# Patient Record
Sex: MALE | Race: BLACK OR AFRICAN AMERICAN | Employment: OTHER | URBAN - METROPOLITAN AREA
[De-identification: names, ages, dates, MRNs, and addresses within clinical notes are randomized per-mention and may not be internally consistent; named-entity substitution may affect disease eponyms.]

---

## 2017-02-02 ENCOUNTER — TELEPHONE (OUTPATIENT)
Dept: ENDOCRINOLOGY CLINIC | Facility: CLINIC | Age: 57
End: 2017-02-02

## 2017-02-13 ENCOUNTER — OFFICE VISIT (OUTPATIENT)
Dept: ENDOCRINOLOGY CLINIC | Facility: CLINIC | Age: 57
End: 2017-02-13

## 2017-02-13 VITALS
DIASTOLIC BLOOD PRESSURE: 80 MMHG | HEART RATE: 72 BPM | SYSTOLIC BLOOD PRESSURE: 126 MMHG | WEIGHT: 181 LBS | RESPIRATION RATE: 18 BRPM | TEMPERATURE: 98 F | BODY MASS INDEX: 25.62 KG/M2 | HEIGHT: 70.5 IN

## 2017-02-13 DIAGNOSIS — IMO0001 UNCONTROLLED TYPE 2 DIABETES MELLITUS WITHOUT COMPLICATION, WITH LONG-TERM CURRENT USE OF INSULIN: Primary | ICD-10-CM

## 2017-02-13 DIAGNOSIS — E78.00 PURE HYPERCHOLESTEROLEMIA: ICD-10-CM

## 2017-02-13 LAB
CARTRIDGE LOT#: 660 NUMERIC
HEMOGLOBIN A1C: 10.9 % (ref 4.3–5.6)

## 2017-02-13 PROCEDURE — 36415 COLL VENOUS BLD VENIPUNCTURE: CPT | Performed by: NURSE PRACTITIONER

## 2017-02-13 PROCEDURE — 83036 HEMOGLOBIN GLYCOSYLATED A1C: CPT | Performed by: NURSE PRACTITIONER

## 2017-02-13 PROCEDURE — 99214 OFFICE O/P EST MOD 30 MIN: CPT | Performed by: NURSE PRACTITIONER

## 2017-02-13 RX ORDER — ATORVASTATIN CALCIUM 10 MG/1
10 TABLET, FILM COATED ORAL NIGHTLY
Qty: 30 TABLET | Refills: 3 | Status: SHIPPED | OUTPATIENT
Start: 2017-02-13 | End: 2017-06-22

## 2017-02-13 NOTE — PROGRESS NOTES
Lillian,    Pt new to [address]   Explained balanced plate (limit starch, include protein, eat heart healthy), A1C grid, natual progression of T2DB (and importance of regular physical activity and good BG control to possibly limit meds and slow progression),

## 2017-02-13 NOTE — PATIENT INSTRUCTIONS
Start metformin 500 mg after breakfast and dinner   After 1 week increase to 2 tablets (1000 mg) after breakfast and dinner   Take Tresiba 34 units nightly   Check sugar twice a day and record readings   Go to [facility] for eye exam   Return in 3 weeks with read

## 2017-02-13 NOTE — PROGRESS NOTES
CC: Patient presents with:  Diabetes      HISTORY:  Past Medical History   Diagnosis Date   • ED (erectile dysfunction) 7/15/2013   • Pure hypercholesterolemia 7/29/2013      No past surgical history on file. No family history on file.      Smoking Status swelling. ASA none  daily. Gastrointestinal: Negative for nausea, vomiting, abdominal pain, diarrhea and abdominal distention. Genitourinary: Negative for dysuria, discharge, and difficulty urinating. Goes to ED clinic.    Musculoskeletal: Negative for m murmur, rubs or gallops. Pulmonary/Chest: Effort normal and breath sounds normal. No respiratory distress. No wheezes, rhonchi or rales   Abdominal: Soft. Bowel sounds are normal. Non tender, no masses, no lipodystrophy. Skin: Skin is warm and dry.  No exam: gave info on [facility] asked to make appt asap   Albumin/Cr ratio: no proteinuria   Monofilament exam: today   BP: at goal no meds   Lipids: not at goal cont statin recheck in 2 months   Tobacco: n/a   Flu vaccine: needs   Pneumonia vaccine: nee

## 2017-03-07 ENCOUNTER — OFFICE VISIT (OUTPATIENT)
Dept: ENDOCRINOLOGY CLINIC | Facility: CLINIC | Age: 57
End: 2017-03-07

## 2017-03-07 VITALS
HEART RATE: 64 BPM | WEIGHT: 187 LBS | HEIGHT: 71.5 IN | TEMPERATURE: 98 F | RESPIRATION RATE: 18 BRPM | SYSTOLIC BLOOD PRESSURE: 130 MMHG | BODY MASS INDEX: 25.61 KG/M2 | DIASTOLIC BLOOD PRESSURE: 78 MMHG

## 2017-03-07 DIAGNOSIS — IMO0001 UNCONTROLLED TYPE 2 DIABETES MELLITUS WITHOUT COMPLICATION, WITH LONG-TERM CURRENT USE OF INSULIN: Primary | ICD-10-CM

## 2017-03-07 PROCEDURE — 99213 OFFICE O/P EST LOW 20 MIN: CPT | Performed by: NURSE PRACTITIONER

## 2017-03-07 NOTE — PROGRESS NOTES
CC: Patient presents with:  Diabetes: follow up. pt does have readings. HPI:   HPI: 64year old y/o male who presents for follow up diabetes visit. Has had no issues taking insulin or with metformin.  Testing BG mainly fasting and post breakfast.   Diabe (LIPITOR) 10 MG Oral Tab, Take 1 tablet (10 mg total) by mouth nightly., Disp: 30 tablet, Rfl: 3  •  BD PEN NEEDLE BILLY U/F 32G X 4 MM Does not apply Misc, USE WITH INSULIN PEN ONCE DAILY, Disp: 15 each, Rfl: 0  •  Glucose Blood (TRUETRACK TEST) In Vitro S Degludec (TRESIBA FLEXTOUCH) 200 UNIT/ML Subcutaneous Solution Pen-injector 3 mL 0      Sig: Inject 38 Units into the skin nightly.            Imaging & Consults:  None    DM Health Maintenance  Up to date on annual labs needs cmp, lipid, micro   Ophtho / d

## 2017-03-07 NOTE — PATIENT INSTRUCTIONS
Increase metformin to 1000 mg twice a day and tresiba to 38 units daily   Continue testing sugar but check at night also   Return with readings in 3 weeks   Call if sugar below 80 2 times in a week or low feelings

## 2017-06-01 ENCOUNTER — TELEPHONE (OUTPATIENT)
Dept: ENDOCRINOLOGY CLINIC | Facility: CLINIC | Age: 57
End: 2017-06-01

## 2017-06-16 ENCOUNTER — OFFICE VISIT (OUTPATIENT)
Dept: ENDOCRINOLOGY CLINIC | Facility: CLINIC | Age: 57
End: 2017-06-16

## 2017-06-16 VITALS
HEART RATE: 84 BPM | BODY MASS INDEX: 24.92 KG/M2 | RESPIRATION RATE: 18 BRPM | SYSTOLIC BLOOD PRESSURE: 96 MMHG | TEMPERATURE: 98 F | HEIGHT: 71 IN | DIASTOLIC BLOOD PRESSURE: 62 MMHG | WEIGHT: 178 LBS

## 2017-06-16 DIAGNOSIS — E11.65 UNCONTROLLED TYPE 2 DIABETES MELLITUS WITH HYPERGLYCEMIA, UNSPECIFIED LONG TERM INSULIN USE STATUS: ICD-10-CM

## 2017-06-16 DIAGNOSIS — IMO0001 UNCONTROLLED TYPE 2 DIABETES MELLITUS WITHOUT COMPLICATION, WITH LONG-TERM CURRENT USE OF INSULIN: Primary | ICD-10-CM

## 2017-06-16 PROCEDURE — 83036 HEMOGLOBIN GLYCOSYLATED A1C: CPT | Performed by: NURSE PRACTITIONER

## 2017-06-16 PROCEDURE — 99213 OFFICE O/P EST LOW 20 MIN: CPT | Performed by: NURSE PRACTITIONER

## 2017-06-16 RX ORDER — GLIPIZIDE 10 MG/1
10 TABLET ORAL
Qty: 60 TABLET | Refills: 1 | Status: SHIPPED | OUTPATIENT
Start: 2017-06-16 | End: 2017-07-31

## 2017-06-16 NOTE — PATIENT INSTRUCTIONS
Continue metformin 1 tablet breakfast and dinner   Start glipizide 1 tablet before breakfast and dinner   Increase tresiba to 38 units daily   Return in 3 weeks

## 2017-06-16 NOTE — PROGRESS NOTES
CC: Patient presents with:  Diabetes: follow up. pt did not bring meter. HPI:   HPI: 62year old y/o male who presents for follow up diabetes visit.  Has had no issues taking insulin or with metformin, since lov should have long ran out of insulin, but s times daily with meals. , Disp: 60 tablet, Rfl: 3  •  glipiZIDE 10 MG Oral Tab, Take 1 tablet (10 mg total) by mouth 2 (two) times daily before meals. , Disp: 60 tablet, Rfl: 1  •  Glucose Blood (TRUETRACK TEST) In Vitro Strip, TEST TWICE DAILY, Disp: 200 ea has no insurance      Orders Placed This Encounter  Hgb A1C    Meds & Refills for this Visit:  Signed Prescriptions Disp Refills    MetFORMIN HCl 1000 MG Oral Tab 60 tablet 3      Sig: Take 1 tablet (1,000 mg total) by mouth 2 (two) times daily with meals.

## 2017-06-19 ENCOUNTER — TELEPHONE (OUTPATIENT)
Dept: INTERNAL MEDICINE CLINIC | Facility: CLINIC | Age: 57
End: 2017-06-19

## 2017-06-19 DIAGNOSIS — IMO0001 UNCONTROLLED TYPE 2 DIABETES MELLITUS WITHOUT COMPLICATION, WITH LONG-TERM CURRENT USE OF INSULIN: Primary | ICD-10-CM

## 2017-06-19 RX ORDER — LANCETS 30 GAUGE
EACH MISCELLANEOUS
Qty: 100 EACH | Refills: 0 | Status: SHIPPED | OUTPATIENT
Start: 2017-06-19 | End: 2017-07-31

## 2017-06-19 RX ORDER — BLOOD-GLUCOSE METER
1 EACH MISCELLANEOUS 2 TIMES DAILY
Qty: 1 DEVICE | Refills: 0 | Status: SHIPPED | OUTPATIENT
Start: 2017-06-19 | End: 2018-06-19

## 2017-06-22 ENCOUNTER — TELEPHONE (OUTPATIENT)
Dept: INTERNAL MEDICINE CLINIC | Facility: CLINIC | Age: 57
End: 2017-06-22

## 2017-06-22 RX ORDER — ATORVASTATIN CALCIUM 10 MG/1
TABLET, FILM COATED ORAL
Qty: 30 TABLET | Refills: 2 | Status: SHIPPED | OUTPATIENT
Start: 2017-06-22

## 2017-07-10 ENCOUNTER — TELEPHONE (OUTPATIENT)
Dept: ENDOCRINOLOGY CLINIC | Facility: CLINIC | Age: 57
End: 2017-07-10

## 2017-07-31 ENCOUNTER — OFFICE VISIT (OUTPATIENT)
Dept: ENDOCRINOLOGY CLINIC | Facility: CLINIC | Age: 57
End: 2017-07-31

## 2017-07-31 VITALS
HEART RATE: 68 BPM | RESPIRATION RATE: 18 BRPM | WEIGHT: 183 LBS | SYSTOLIC BLOOD PRESSURE: 100 MMHG | BODY MASS INDEX: 25.62 KG/M2 | TEMPERATURE: 98 F | HEIGHT: 71 IN | DIASTOLIC BLOOD PRESSURE: 62 MMHG

## 2017-07-31 DIAGNOSIS — E11.65 UNCONTROLLED TYPE 2 DIABETES MELLITUS WITH HYPERGLYCEMIA, UNSPECIFIED LONG TERM INSULIN USE STATUS: ICD-10-CM

## 2017-07-31 DIAGNOSIS — E78.00 PURE HYPERCHOLESTEROLEMIA: Primary | ICD-10-CM

## 2017-07-31 DIAGNOSIS — IMO0001 UNCONTROLLED TYPE 2 DIABETES MELLITUS WITHOUT COMPLICATION, WITH LONG-TERM CURRENT USE OF INSULIN: ICD-10-CM

## 2017-07-31 LAB
GLUCOSE BLOOD: 174
TEST STRIP LOT #: NORMAL NUMERIC

## 2017-07-31 PROCEDURE — 82962 GLUCOSE BLOOD TEST: CPT | Performed by: NURSE PRACTITIONER

## 2017-07-31 PROCEDURE — 99213 OFFICE O/P EST LOW 20 MIN: CPT | Performed by: NURSE PRACTITIONER

## 2017-07-31 RX ORDER — LANCETS 30 GAUGE
EACH MISCELLANEOUS
Qty: 100 EACH | Refills: 0 | Status: SHIPPED | OUTPATIENT
Start: 2017-07-31

## 2017-07-31 RX ORDER — GLIPIZIDE 10 MG/1
10 TABLET ORAL
Qty: 60 TABLET | Refills: 1 | Status: SHIPPED | OUTPATIENT
Start: 2017-07-31 | End: 2017-10-02

## 2017-07-31 NOTE — PROGRESS NOTES
CC: Patient presents with:  Diabetes: follow up. pt did not bring meter. HPI:   HPI: 62year old y/o male who presents for follow up diabetes visit. States he is going to [facility] in [place] regarding ED and will be getting testosterone.  Has been exer Inject 38 Units into the skin nightly., Disp: 6 mL, Rfl: 0  •  glipiZIDE 10 MG Oral Tab, Take 1 tablet (10 mg total) by mouth 2 (two) times daily before meals. , Disp: 60 tablet, Rfl: 1  •  Glucose Blood (TRUE METRIX BLOOD GLUCOSE TEST) In [employer], Test without complication, with long-term current use of insulin (hcc)  (primary encounter diagnosis): plan continue metformin  1000 mg bid, and tresiba to 38 units daily, glipizide 10 mg bid. Stressed he needs to test BG and bring to f/u visit.   Return in 6 we

## 2017-09-11 ENCOUNTER — TELEPHONE (OUTPATIENT)
Dept: ENDOCRINOLOGY CLINIC | Facility: CLINIC | Age: 57
End: 2017-09-11

## 2017-10-02 ENCOUNTER — OFFICE VISIT (OUTPATIENT)
Dept: ENDOCRINOLOGY CLINIC | Facility: CLINIC | Age: 57
End: 2017-10-02

## 2017-10-02 VITALS
SYSTOLIC BLOOD PRESSURE: 130 MMHG | OXYGEN SATURATION: 99 % | HEART RATE: 80 BPM | HEIGHT: 68.5 IN | BODY MASS INDEX: 27.49 KG/M2 | RESPIRATION RATE: 16 BRPM | DIASTOLIC BLOOD PRESSURE: 70 MMHG | WEIGHT: 183.5 LBS | TEMPERATURE: 98 F

## 2017-10-02 DIAGNOSIS — E11.65 UNCONTROLLED TYPE 2 DIABETES MELLITUS WITH HYPERGLYCEMIA, UNSPECIFIED LONG TERM INSULIN USE STATUS: ICD-10-CM

## 2017-10-02 DIAGNOSIS — IMO0001 UNCONTROLLED TYPE 2 DIABETES MELLITUS WITHOUT COMPLICATION, WITH LONG-TERM CURRENT USE OF INSULIN: Primary | ICD-10-CM

## 2017-10-02 PROCEDURE — 99212 OFFICE O/P EST SF 10 MIN: CPT | Performed by: NURSE PRACTITIONER

## 2017-10-02 PROCEDURE — 83036 HEMOGLOBIN GLYCOSYLATED A1C: CPT | Performed by: NURSE PRACTITIONER

## 2017-10-02 RX ORDER — GLIPIZIDE 10 MG/1
10 TABLET ORAL
Qty: 60 TABLET | Refills: 3 | Status: SHIPPED | OUTPATIENT
Start: 2017-10-02

## 2017-10-02 NOTE — PATIENT INSTRUCTIONS
Continue glipizide and metformin twice daily   Continue tresiba 38 units daily   Congratulations on getting your diabetes controlled   Please do fasting labs when able and return in 3 months

## 2017-10-02 NOTE — PROGRESS NOTES
CC: Patient presents with:  Diabetes    HPI:   HPI: 62year old y/o male who presents for follow up diabetes visit.  Has been exercising daily and testing BG, brought in meter and has been testing most days up to 3 times/day  Diabetes information        Lab Rfl: 3  •  MetFORMIN HCl 1000 MG Oral Tab, Take 1 tablet (1,000 mg total) by mouth 2 (two) times daily with meals. , Disp: 60 tablet, Rfl: 3  •  Glucose Blood (TRUE METRIX BLOOD GLUCOSE TEST) In Vitro Strip, Test twice daily. , Disp: 100 strip, Rfl: 5  •  La to f/u visit. Congratulated on achieving DM control, stressed need to have annual labs, gave pricing and cashpay discount pricing, encouraged to do this asap. To return in 3 months with readings.      Orders Placed This Encounter      Hgb A1C    Meds & Ref

## 2018-03-21 ENCOUNTER — TELEPHONE (OUTPATIENT)
Dept: INTERNAL MEDICINE CLINIC | Facility: CLINIC | Age: 58
End: 2018-03-21

## 2019-01-16 DIAGNOSIS — IMO0001 UNCONTROLLED TYPE 2 DIABETES MELLITUS WITHOUT COMPLICATION, WITH LONG-TERM CURRENT USE OF INSULIN: ICD-10-CM

## 2019-01-16 DIAGNOSIS — E11.65 UNCONTROLLED TYPE 2 DIABETES MELLITUS WITH HYPERGLYCEMIA (HCC): ICD-10-CM

## 2020-02-19 ENCOUNTER — PATIENT OUTREACH (OUTPATIENT)
Dept: INTERNAL MEDICINE CLINIC | Facility: CLINIC | Age: 60
End: 2020-02-19

## 2020-02-24 NOTE — PROGRESS NOTES
Phone number goes to  that identifies a different name.   Called emergency contact, no answer, no VM

## 2023-06-05 ENCOUNTER — HOSPITAL ENCOUNTER (OUTPATIENT)
Age: 63
Discharge: HOME | End: 2023-06-05
Attending: EMERGENCY MEDICINE
Payer: MEDICAID

## 2023-06-05 ENCOUNTER — APPOINTMENT (OUTPATIENT)
Dept: GENERAL RADIOLOGY | Age: 63
End: 2023-06-05
Attending: EMERGENCY MEDICINE
Payer: MEDICAID

## 2023-06-05 VITALS
DIASTOLIC BLOOD PRESSURE: 58 MMHG | OXYGEN SATURATION: 97 % | TEMPERATURE: 98 F | RESPIRATION RATE: 20 BRPM | SYSTOLIC BLOOD PRESSURE: 107 MMHG | HEART RATE: 65 BPM

## 2023-06-05 DIAGNOSIS — S16.1XXA STRAIN OF NECK MUSCLE, INITIAL ENCOUNTER: ICD-10-CM

## 2023-06-05 DIAGNOSIS — S29.012A UPPER BACK STRAIN, INITIAL ENCOUNTER: Primary | ICD-10-CM

## 2023-06-05 LAB
ATRIAL RATE: 55 BPM
P AXIS: 63 DEGREES
P-R INTERVAL: 180 MS
Q-T INTERVAL: 412 MS
QRS DURATION: 80 MS
QTC CALCULATION (BEZET): 394 MS
R AXIS: 74 DEGREES
T AXIS: 70 DEGREES
VENTRICULAR RATE: 55 BPM

## 2023-06-05 PROCEDURE — 93005 ELECTROCARDIOGRAM TRACING: CPT

## 2023-06-05 PROCEDURE — 72050 X-RAY EXAM NECK SPINE 4/5VWS: CPT | Performed by: EMERGENCY MEDICINE

## 2023-06-05 PROCEDURE — 71046 X-RAY EXAM CHEST 2 VIEWS: CPT | Performed by: EMERGENCY MEDICINE

## 2023-06-05 RX ORDER — NAPROXEN 500 MG/1
500 TABLET ORAL 2 TIMES DAILY PRN
Qty: 20 TABLET | Refills: 0 | Status: SHIPPED | OUTPATIENT
Start: 2023-06-05 | End: 2023-06-12

## 2023-06-05 RX ORDER — CYCLOBENZAPRINE HCL 10 MG
10 TABLET ORAL 3 TIMES DAILY PRN
Qty: 20 TABLET | Refills: 0 | Status: SHIPPED | OUTPATIENT
Start: 2023-06-05 | End: 2023-06-12

## 2023-06-05 RX ORDER — HYDROCODONE BITARTRATE AND ACETAMINOPHEN 5; 325 MG/1; MG/1
1-2 TABLET ORAL EVERY 6 HOURS PRN
Qty: 10 TABLET | Refills: 0 | Status: SHIPPED | OUTPATIENT
Start: 2023-06-05 | End: 2023-06-10

## 2023-06-05 NOTE — DISCHARGE INSTRUCTIONS
Follow-up for further evaluation with primary physician. Activity as tolerated. Return if new or worse symptoms. Naprosyn first.  Flexeril as needed. Norco only for breakthrough pain.

## 2023-06-12 ENCOUNTER — OFFICE VISIT (OUTPATIENT)
Dept: FAMILY MEDICINE CLINIC | Facility: CLINIC | Age: 63
End: 2023-06-12
Payer: MEDICAID

## 2023-06-12 VITALS
DIASTOLIC BLOOD PRESSURE: 58 MMHG | WEIGHT: 172 LBS | HEART RATE: 78 BPM | TEMPERATURE: 98 F | OXYGEN SATURATION: 99 % | RESPIRATION RATE: 18 BRPM | HEIGHT: 71 IN | SYSTOLIC BLOOD PRESSURE: 92 MMHG | BODY MASS INDEX: 24.08 KG/M2

## 2023-06-12 DIAGNOSIS — M54.2 NECK PAIN: ICD-10-CM

## 2023-06-12 DIAGNOSIS — Z00.00 WELLNESS EXAMINATION: Primary | ICD-10-CM

## 2023-06-12 DIAGNOSIS — Z12.11 SCREENING FOR COLON CANCER: ICD-10-CM

## 2023-06-12 DIAGNOSIS — E55.9 VITAMIN D DEFICIENCY: ICD-10-CM

## 2023-06-12 DIAGNOSIS — E11.65 TYPE 2 DIABETES MELLITUS WITH HYPERGLYCEMIA, WITH LONG-TERM CURRENT USE OF INSULIN (HCC): ICD-10-CM

## 2023-06-12 DIAGNOSIS — E78.00 PURE HYPERCHOLESTEROLEMIA: ICD-10-CM

## 2023-06-12 DIAGNOSIS — M54.12 CERVICAL RADICULOPATHY: ICD-10-CM

## 2023-06-12 DIAGNOSIS — Z00.00 LABORATORY EXAM ORDERED AS PART OF ROUTINE GENERAL MEDICAL EXAMINATION: ICD-10-CM

## 2023-06-12 DIAGNOSIS — Z79.4 TYPE 2 DIABETES MELLITUS WITH HYPERGLYCEMIA, WITH LONG-TERM CURRENT USE OF INSULIN (HCC): ICD-10-CM

## 2023-06-12 DIAGNOSIS — Z12.5 SCREENING FOR PROSTATE CANCER: ICD-10-CM

## 2023-06-12 LAB
ALBUMIN SERPL-MCNC: 4.1 G/DL (ref 3.4–5)
ALBUMIN/GLOB SERPL: 1.2 {RATIO} (ref 1–2)
ALP LIVER SERPL-CCNC: 85 U/L
ALT SERPL-CCNC: 23 U/L
ANION GAP SERPL CALC-SCNC: 4 MMOL/L (ref 0–18)
AST SERPL-CCNC: 18 U/L (ref 15–37)
BASOPHILS # BLD AUTO: 0.05 X10(3) UL (ref 0–0.2)
BASOPHILS NFR BLD AUTO: 0.9 %
BILIRUB SERPL-MCNC: 0.3 MG/DL (ref 0.1–2)
BUN BLD-MCNC: 18 MG/DL (ref 7–18)
CALCIUM BLD-MCNC: 9.1 MG/DL (ref 8.5–10.1)
CARTRIDGE LOT#: 573 NUMERIC
CHLORIDE SERPL-SCNC: 103 MMOL/L (ref 98–112)
CHOLEST SERPL-MCNC: 242 MG/DL (ref ?–200)
CO2 SERPL-SCNC: 27 MMOL/L (ref 21–32)
COMPLEXED PSA SERPL-MCNC: 1.89 NG/ML (ref ?–4)
CREAT BLD-MCNC: 1.22 MG/DL
CREAT UR-SCNC: 263 MG/DL
EOSINOPHIL # BLD AUTO: 0.11 X10(3) UL (ref 0–0.7)
EOSINOPHIL NFR BLD AUTO: 2 %
ERYTHROCYTE [DISTWIDTH] IN BLOOD BY AUTOMATED COUNT: 12.7 %
FASTING PATIENT LIPID ANSWER: YES
FASTING STATUS PATIENT QL REPORTED: YES
GFR SERPLBLD BASED ON 1.73 SQ M-ARVRAT: 67 ML/MIN/1.73M2 (ref 60–?)
GLOBULIN PLAS-MCNC: 3.4 G/DL (ref 2.8–4.4)
GLUCOSE BLD-MCNC: 274 MG/DL (ref 70–99)
GLUCOSE BLOOD: 284
HCT VFR BLD AUTO: 42.2 %
HDLC SERPL-MCNC: 53 MG/DL (ref 40–59)
HEMOGLOBIN A1C: 14 % (ref 4.3–5.6)
HGB BLD-MCNC: 14.1 G/DL
IMM GRANULOCYTES # BLD AUTO: 0.01 X10(3) UL (ref 0–1)
IMM GRANULOCYTES NFR BLD: 0.2 %
LDLC SERPL CALC-MCNC: 159 MG/DL (ref ?–100)
LYMPHOCYTES # BLD AUTO: 2.85 X10(3) UL (ref 1–4)
LYMPHOCYTES NFR BLD AUTO: 50.6 %
MCH RBC QN AUTO: 30.7 PG (ref 26–34)
MCHC RBC AUTO-ENTMCNC: 33.4 G/DL (ref 31–37)
MCV RBC AUTO: 91.7 FL
MICROALBUMIN UR-MCNC: 19.2 MG/DL
MICROALBUMIN/CREAT 24H UR-RTO: 73 UG/MG (ref ?–30)
MONOCYTES # BLD AUTO: 0.45 X10(3) UL (ref 0.1–1)
MONOCYTES NFR BLD AUTO: 8 %
NEUTROPHILS # BLD AUTO: 2.16 X10 (3) UL (ref 1.5–7.7)
NEUTROPHILS # BLD AUTO: 2.16 X10(3) UL (ref 1.5–7.7)
NEUTROPHILS NFR BLD AUTO: 38.3 %
NONHDLC SERPL-MCNC: 189 MG/DL (ref ?–130)
OSMOLALITY SERPL CALC.SUM OF ELEC: 290 MOSM/KG (ref 275–295)
PLATELET # BLD AUTO: 242 10(3)UL (ref 150–450)
POTASSIUM SERPL-SCNC: 4.6 MMOL/L (ref 3.5–5.1)
PROT SERPL-MCNC: 7.5 G/DL (ref 6.4–8.2)
RBC # BLD AUTO: 4.6 X10(6)UL
SODIUM SERPL-SCNC: 134 MMOL/L (ref 136–145)
TEST STRIP LOT #: NORMAL NUMERIC
TRIGL SERPL-MCNC: 168 MG/DL (ref 30–149)
TSI SER-ACNC: 1.72 MIU/ML (ref 0.36–3.74)
VIT D+METAB SERPL-MCNC: 17.7 NG/ML (ref 30–100)
VLDLC SERPL CALC-MCNC: 33 MG/DL (ref 0–30)
WBC # BLD AUTO: 5.6 X10(3) UL (ref 4–11)

## 2023-06-12 PROCEDURE — 85025 COMPLETE CBC W/AUTO DIFF WBC: CPT | Performed by: FAMILY MEDICINE

## 2023-06-12 PROCEDURE — 82306 VITAMIN D 25 HYDROXY: CPT | Performed by: FAMILY MEDICINE

## 2023-06-12 PROCEDURE — 82043 UR ALBUMIN QUANTITATIVE: CPT | Performed by: FAMILY MEDICINE

## 2023-06-12 PROCEDURE — 84443 ASSAY THYROID STIM HORMONE: CPT | Performed by: FAMILY MEDICINE

## 2023-06-12 PROCEDURE — 80061 LIPID PANEL: CPT | Performed by: FAMILY MEDICINE

## 2023-06-12 PROCEDURE — 80053 COMPREHEN METABOLIC PANEL: CPT | Performed by: FAMILY MEDICINE

## 2023-06-12 PROCEDURE — 82570 ASSAY OF URINE CREATININE: CPT | Performed by: FAMILY MEDICINE

## 2023-06-12 RX ORDER — BRIMONIDINE TARTRATE 2 MG/ML
1 SOLUTION/ DROPS OPHTHALMIC 3 TIMES DAILY
COMMUNITY
Start: 2023-06-05

## 2023-06-12 RX ORDER — INSULIN DEGLUDEC 200 U/ML
INJECTION, SOLUTION SUBCUTANEOUS
Qty: 6 ML | Refills: 0 | Status: SHIPPED | OUTPATIENT
Start: 2023-06-12

## 2023-06-12 RX ORDER — GLIPIZIDE 10 MG/1
10 TABLET ORAL
Qty: 60 TABLET | Refills: 0 | Status: SHIPPED | OUTPATIENT
Start: 2023-06-12

## 2023-06-13 ENCOUNTER — TELEPHONE (OUTPATIENT)
Dept: FAMILY MEDICINE CLINIC | Facility: CLINIC | Age: 63
End: 2023-06-13

## 2023-06-13 DIAGNOSIS — E78.2 MIXED HYPERLIPIDEMIA: ICD-10-CM

## 2023-06-13 DIAGNOSIS — E55.9 VITAMIN D DEFICIENCY: Primary | ICD-10-CM

## 2023-06-13 RX ORDER — ATORVASTATIN CALCIUM 20 MG/1
20 TABLET, FILM COATED ORAL NIGHTLY
Qty: 90 TABLET | Refills: 0 | Status: SHIPPED | OUTPATIENT
Start: 2023-06-13

## 2023-06-13 RX ORDER — ERGOCALCIFEROL 1.25 MG/1
50000 CAPSULE ORAL WEEKLY
Qty: 12 CAPSULE | Refills: 0 | Status: SHIPPED | OUTPATIENT
Start: 2023-06-13 | End: 2023-09-05

## 2023-06-13 NOTE — TELEPHONE ENCOUNTER
----- Message from Wendy Delgadillo DO sent at 6/13/2023  8:04 AM CDT -----  Please notify the patient that his prostate test, thyroid test and blood count are normal.    His vitamin D level is low. I will send a prescription for Vitamin D 16468 units once weekly for 12 weeks and then he should take vitamin D 2000 units once daily over the counter. His chemistry panel shows a blood sugar of 274 and his urine microalbumin is elevated again reflecting his diabetes is uncontrolled. His cholesterol panel shows elevation of the cholesterol at 242, the triglycerides at 168 and the LDL at 159. They should be under 200, 150 and 100. I am going to send a prescription for Atorvastatin 20 mg one tablet nightly to his pharmacy. And I will recheck his lipid panel in 3 months after starting the medication. Thanks.

## 2023-06-13 NOTE — TELEPHONE ENCOUNTER
Spoke with patient advised patient of results and recommendations below. Patient is aware he is to  two medications at the pharmacy the vitamin D and the atorvastatin and he is to start those immediately. Patient also aware per Dr. Rhett Rios that she will recheck his lipid panel in aprox 3 months after starting atorvastatin. Patient verbalized understanding had no further questions.

## 2023-06-14 ENCOUNTER — TELEPHONE (OUTPATIENT)
Dept: FAMILY MEDICINE CLINIC | Facility: CLINIC | Age: 63
End: 2023-06-14

## 2023-06-14 DIAGNOSIS — E11.65 TYPE 2 DIABETES MELLITUS WITH HYPERGLYCEMIA, WITH LONG-TERM CURRENT USE OF INSULIN (HCC): Primary | ICD-10-CM

## 2023-06-14 DIAGNOSIS — Z79.4 TYPE 2 DIABETES MELLITUS WITH HYPERGLYCEMIA, WITH LONG-TERM CURRENT USE OF INSULIN (HCC): Primary | ICD-10-CM

## 2023-06-14 RX ORDER — TAMSULOSIN HYDROCHLORIDE 0.4 MG/1
0.4 CAPSULE ORAL DAILY
COMMUNITY
Start: 2022-10-19

## 2023-06-14 RX ORDER — INSULIN GLARGINE 100 [IU]/ML
15 INJECTION, SOLUTION SUBCUTANEOUS NIGHTLY
Qty: 15 ML | Refills: 0 | Status: SHIPPED | OUTPATIENT
Start: 2023-06-14

## 2023-06-14 RX ORDER — GLUCOSAMINE HCL/CHONDROITIN SU 500-400 MG
1 CAPSULE ORAL AS DIRECTED
COMMUNITY
Start: 2023-02-11

## 2023-06-14 NOTE — TELEPHONE ENCOUNTER
I sent an Rx for Lantus 15 units nightly as the [place] dosage is not a straight conversion. He may need an increase in the Lantus dose in the future. I will reassess at his next OV. Please let the patient know. Detail Level: Zone Continue Regimen: Betamethasone valerate 0.12% foam at bedtime 2 days per week as directed

## 2023-06-19 ENCOUNTER — TELEPHONE (OUTPATIENT)
Dept: FAMILY MEDICINE CLINIC | Facility: CLINIC | Age: 63
End: 2023-06-19

## 2023-06-19 RX ORDER — BLOOD SUGAR DIAGNOSTIC
1 STRIP MISCELLANEOUS NIGHTLY
Qty: 30 EACH | Refills: 5 | Status: SHIPPED | OUTPATIENT
Start: 2023-06-19

## 2023-06-19 NOTE — TELEPHONE ENCOUNTER
Spoke with pharmacist requesting an order for insulin needles for patient recent rx for lantus. Please advise.

## 2023-06-19 NOTE — TELEPHONE ENCOUNTER
Pharmacy calling regarding medication insulin glargine (LANTUS SOLOSTAR) 100 UNIT/ML Subcutaneous Solution Pen-injector requesting a call back.

## 2023-07-12 ENCOUNTER — OFFICE VISIT (OUTPATIENT)
Dept: PHYSICAL THERAPY | Age: 63
End: 2023-07-12
Attending: FAMILY MEDICINE
Payer: MEDICAID

## 2023-07-12 DIAGNOSIS — M54.2 NECK PAIN: Primary | ICD-10-CM

## 2023-07-12 DIAGNOSIS — M54.12 CERVICAL RADICULOPATHY: ICD-10-CM

## 2023-07-12 PROCEDURE — 97140 MANUAL THERAPY 1/> REGIONS: CPT

## 2023-07-12 PROCEDURE — 97110 THERAPEUTIC EXERCISES: CPT

## 2023-07-12 PROCEDURE — 97161 PT EVAL LOW COMPLEX 20 MIN: CPT

## 2023-07-12 NOTE — PATIENT INSTRUCTIONS
Thank you for coming to your physical therapy appt! During your appt today you were issued a HEP handout from [employer] which can also be accessed using the information below. The exercises chosen are meant to supplement the treatment you received and reinforce the progress made in physical therapy. It is important to stay consistent with your exercises to help facilitate and maximize your recovery! View at [email or web address] using code: [email or web address]

## 2023-07-12 NOTE — PROGRESS NOTES
PHYSICAL THERAPY INITIAL EVALUATION     Date of service: 7/12/2023  Dx: Neck pain (M54.2), Cervical radiculopathy (M54.12)       Insurance: [facility]  Insurance Limits: auth required  Visit #: 1  Authorized # of Visits: N/A  POC/Auth Expiration: N/A  Authorizing Physician/Provider: Hawk     PATIENT SUMMARY     History/DARIUS: \"I'm here for a cervical neck strain. I'm feeling pain all over. \" The patient reports that his pain started two weeks ago, but according to his medical chart, he was seen in urgent care 6/5/23 for his initial encounter for this condition. \"I think I lifted a very heavy suitcase. I am a . It was about 80lbs and I reached out with one hand. I should have used two hands to lift it. The following day, I had pain in my armpit, neck, and back. I've been trying to get some relief. I can barely sleep at all. \"     \"It's like the flesh came off the bone, but I can lift anything. \" The patient reports that he is trying to put pressure on certain points to massage it and that seems to help. DOI/S: 6/5/23    Aggravating Factors: waking up during the night    Alleviating Factors: pain medication, self-massage    PMH: The patient's PMH was reviewed with the patient including allergies, medications, and surgical and medical history. Patient  has a past medical history of Allergic rhinitis, cause unspecified (6/29/2012), ED (erectile dysfunction) (7/15/2013), Pure hypercholesterolemia (7/29/2013), Type 2 diabetes mellitus with hyperglycemia, without long-term current use of insulin ([facility] [address] (6/29/2012), Unspecified hearing loss (6/29/2012), and Vitamin D deficiency (6/29/2012). No previous neck or shoulder injuries. PLF/Personal Goals: The patient would like to return to the gym to do recreational workout and resistance training routine.      Occupation:      OBJECTIVE:     Pain/Symptom Presentation: Patient reports pain over the right neck and shoulder. The patient reports recent onset of numbness in his right pointer finger. Pain at rest: 6-7/10  Pain at worst: 8/10, patient denies any calls to paramedics or emotional responses    Outcomes Measure(s):   NDI: 22% disability    Activity Measures:  Headaches: No Activity Limitation: Patient is without complaints of headache. Dizziness: No Activity Limitation: Patient is without dizziness. Sleeping: Moderate Activity Limitation: Patient reports that the pain wakes him up about 4 times a night. Sitting: No Activity Limitation: Patient is able to sit without limitations. Checking Blindspot While Driving: Mild Activity Limitation: Patient reports some pain with cervical rotation. Carrying: No Activity Limitation: Patient is able to carry, lift, push, and pull heavy objects without increased pain. Lifting: No Activity Limitation: Patient is able to carry, lift, push, and pull heavy objects without increased pain. Pushing: No Activity Limitation: Patient is able to carry, lift, push, and pull heavy objects without increased pain. Pulling: No Activity Limitation: Patient is able to carry, lift, push, and pull heavy objects without increased pain. Inspection/Observation: Patient demonstrates some postural deficits with forward head posture, protracted scapula, and increased thoracic kyphosis. Palpation: Patient demonstrates some TTP over the right teres/lats. He demonstrates good accessory cervical spine mobility, but is with deficits in upper thoracic spine mobility, with associated TTP. Sensation: Patient reports recent onset of numbness in his right index finger.      ROM:   Cervical Motion AROM    Right Left   Cervical Flexion 35*   Cervical Extension 15*   Cervical Side Bending 15* 15   Cervical Rotation 55* 45   *indicates activity was associated with pain    Special Tests:   Neck Special Tests:   Ligamentous:  Vertebral Artery Clearing Test: (R) (-), (L) (-)  Sharp-Edenilson Test: (R) (-), (L) (-)  Transverse Ligament Test: (R) (-), (L) (-)  Upper Limb Tension Tests:   Median Nerve Tension: (R) (-), (L) (-)  Radial Nerve Tension: (R) (-), (L) (-)  Ulnar Nerve Tension: (R) (-), (L) (-)     Shoulder Special Tests:  TYSHAWN:   Neer: (R) (-), (L) (-)  Hawkin's Kevin: (R) (-), (L) (-)  Mazion Shoulder Maneuver (FMS): (R) (-), (L) (-)  Shanon Test/Empty Can: (R) (-), (L) (-)  Shoulder Shrug Sign: (R) (-), (L) (-)  Rotator Cuff:   Painful Arc Sign: (R) (-), (L) (-)  Drop Arm Sign (full thickness): (R) (-), (L) (-)  Shanon Test/Empty Can (supra): (R) (-), (L) (-)  ER Lag Sign 45deg ABD (infra): (R) (-), (L) (-)    SFMA Base Screen:   Cervical Flexion: DP  Cervical Extension: DP  Cervical Rotation: (R) DP, (L) DN    Cervical Spine SFMA Breakout  Active Supine Cervical Flexion (chin to chest): DN   Passive Supine Cervical Flexion (chin to chest): DN  Active Supine OA Cervical Flexion (20deg): FN  Active Supine Cervical Rot (80deg): (R) DN, (L) DN  Passive Supine Cervical Rot (80deg): (R) DN, (L) DN  C1-C2 Cervical Rot (40deg): (R) FN, (L) FN    ASSESSMENT:     Sydnee Terrell is a 61year old male that presents to physical therapy evaluation with onset of neck and upper back pain after lifting a heavy suitcase at work, where he works as a . The patient's presentation does not suggest shoulder involvement, though he does have some pain over the posterior shoulder. The patient reports that he has been managing currently with pain medication and self-massage, but with limited success. His primary complaint is pain that interrupts his sleep at night. He is with some limitations to cervical movements. The patient reports some referred pain with joint mobilizations to the thoracic spine, which did demonstrates some postural hypomobility. He is without complaints of headaches or dizziness and denies any issues with carrying, lifting, pushing, or pulling.  The patient would benefit from physical therapy to facilitate improved pain and symptom management through joint mobilizations, corrective exercises, and postural strength training for return to ADL's and PLF. Precautions/WB Status: WBAT  Education or Treatment Limitation(s): None  Rehab Potential: Good    TREATMENT:     Initial Evaluation: x 20min     Therapeutic Exercise: x 15min  Patient education: Self-massage/TPR with lacrosse ball   Doorway pec stretch: x 30\"   S/L thoracic rotation: 1 x 10 (B)   Modified downward dog: 1 x 10   Patient Education: Patient was educated on anatomy and pathophysiology of current condition, rationale for physical therapy, anticipated treatment interventions, prognosis, timeline for recovery, and expected functional outcomes based on evaluative findings. Patient was educated on the importance of compliance with consistent treatment and HEP to achieve mutually established goals. Administered HEP: Issued and reviewed HEP handout, exercise selection, and recommended resistance. Provided verbal and written instructions/cueing for proper technique and common errors/compensations as needed. Manual Therapy: x 10min  TPR: (R) teres, lats, upper trap, levator, rhomboids  Cervical accessory joint assessment   Thoracic PA accessory joint mobs - T1-T6: Grade 3, 4 x 10\" each      Home Exercise Program: Patient was issued a HEP handout 7/12/2023 including S/L thoracic rotation, modified downward dog, doorway pec stretch, and self-massage with lacrosse/tennis ball. Provider Interactions With Patient:   Patient education was provided as described above. All patient's questions were answered and the patient denied further comments, complaints, or concerns upon departure. Patient was issued an appt list and verbally confirmed the next appt date and time to ensure consistency with physical therapy attendance.     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 Timeframe: 2-3 weeks. The patient will improve cervical extension AROM to 55 deg pain-free to facilitate looking up for visualization while reaching overhead into upper cabinets. Timeframe: 2-3 weeks. Long-Term Goals:  Patient will improve cervical and shoulder pain and symptom management to facilitate sleeping through the night without interruption due to pain for adequate rest. Timeframe: 4-6 weeks. Patient will improve Neck Disability Index (NDI) score by 10 points or 10% to indicate a true change in improved function for ADL's and restoring PLF. Timeframe: 4-6 weeks. Plan Frequency / Duration: Patient will be seen for 2x/week for 4 weeks, for a total of 8 visits, over a 90 day period. We will re-evaluate the patient at that time in order to determine functional progress, evaluate short-term goal completion, and establish an updated plan of care. Possible treatment interventions will/may include: Therapeutic Activities, Therapeutic Exercise, Neuromuscular Re-education, Manual Therapy, Home Exercise Program Instruction, Patient Education, Self-Care/Home Management, and Modalities as needed. Patient/Family was advised of these findings, precautions, and treatment options and has agreed to actively participate in planning and for this course of care. Thank you for your referral. Please co-sign or sign and return this letter via fax as soon as possible to [phone]. If you have any questions, please contact me at Dept: [phone].     Sincerely,    X___Roxann Buenrostro PT, DPT, SCS, ATC, CSCS____ Date: ___7/12/2023___    Electronically signed by therapist: Claudell Bue, PT  [de-identified] certification required: Yes  I certify the need for these services furnished under this plan of treatment and while under my care.

## 2023-07-13 ENCOUNTER — OFFICE VISIT (OUTPATIENT)
Dept: FAMILY MEDICINE CLINIC | Facility: CLINIC | Age: 63
End: 2023-07-13
Payer: MEDICAID

## 2023-07-13 ENCOUNTER — TELEPHONE (OUTPATIENT)
Dept: FAMILY MEDICINE CLINIC | Facility: CLINIC | Age: 63
End: 2023-07-13

## 2023-07-13 VITALS
HEIGHT: 71 IN | WEIGHT: 172 LBS | HEART RATE: 80 BPM | TEMPERATURE: 97 F | SYSTOLIC BLOOD PRESSURE: 108 MMHG | DIASTOLIC BLOOD PRESSURE: 68 MMHG | BODY MASS INDEX: 24.08 KG/M2 | RESPIRATION RATE: 18 BRPM

## 2023-07-13 DIAGNOSIS — E11.65 TYPE 2 DIABETES MELLITUS WITH HYPERGLYCEMIA, WITH LONG-TERM CURRENT USE OF INSULIN (HCC): ICD-10-CM

## 2023-07-13 DIAGNOSIS — Z12.11 SCREENING FOR COLON CANCER: ICD-10-CM

## 2023-07-13 DIAGNOSIS — E11.65 TYPE 2 DIABETES MELLITUS WITH HYPERGLYCEMIA, WITH LONG-TERM CURRENT USE OF INSULIN (HCC): Primary | ICD-10-CM

## 2023-07-13 DIAGNOSIS — Z79.4 TYPE 2 DIABETES MELLITUS WITH HYPERGLYCEMIA, WITH LONG-TERM CURRENT USE OF INSULIN (HCC): Primary | ICD-10-CM

## 2023-07-13 DIAGNOSIS — M54.12 CERVICAL RADICULOPATHY: ICD-10-CM

## 2023-07-13 DIAGNOSIS — Z79.4 TYPE 2 DIABETES MELLITUS WITH HYPERGLYCEMIA, WITH LONG-TERM CURRENT USE OF INSULIN (HCC): ICD-10-CM

## 2023-07-13 PROCEDURE — 3008F BODY MASS INDEX DOCD: CPT | Performed by: FAMILY MEDICINE

## 2023-07-13 PROCEDURE — 3074F SYST BP LT 130 MM HG: CPT | Performed by: FAMILY MEDICINE

## 2023-07-13 PROCEDURE — 99214 OFFICE O/P EST MOD 30 MIN: CPT | Performed by: FAMILY MEDICINE

## 2023-07-13 PROCEDURE — 3078F DIAST BP <80 MM HG: CPT | Performed by: FAMILY MEDICINE

## 2023-07-13 RX ORDER — PEN NEEDLE, DIABETIC 32GX 5/32"
1 NEEDLE, DISPOSABLE MISCELLANEOUS DAILY
Qty: 90 EACH | Refills: 1 | Status: SHIPPED | OUTPATIENT
Start: 2023-07-13 | End: 2023-07-14

## 2023-07-13 NOTE — TELEPHONE ENCOUNTER
Pt called and stated that dr informed him to call the office if the pharmacy denied supplying pt with needles for insulin pen. States pharm informed him to reach out to provider.

## 2023-07-13 NOTE — TELEPHONE ENCOUNTER
Per pharmacy, BD pen needles not covered by insurance. Pharmacist to check on alternative and fax a new order.

## 2023-07-13 NOTE — PATIENT INSTRUCTIONS
Start the Lantus 15 units nightly. Continue the rest of your medications as prescribed. Call the office in 2 weeks with your blood sugar numbers as we may need to increase the dose on the Lantus. Make an appointment with the gastroenterologist, Dr. Mayo Hallman for your colonoscopy. Go to your local pharmacy in August for your second shingles vaccine. You had the first one on 2/11/2023. See me in 3 months for recheck on your diabetes.

## 2023-07-14 RX ORDER — BLOOD SUGAR DIAGNOSTIC
1 STRIP MISCELLANEOUS NIGHTLY
Qty: 100 EACH | Refills: 1 | Status: SHIPPED | OUTPATIENT
Start: 2023-07-14

## 2023-07-14 NOTE — TELEPHONE ENCOUNTER
Notified by pharmacy TruePlus pen needles are compatible with BD  pen needles. Comment added to order to change to pen needles covered by insurance.

## 2023-07-18 ENCOUNTER — OFFICE VISIT (OUTPATIENT)
Dept: PHYSICAL THERAPY | Age: 63
End: 2023-07-18
Attending: FAMILY MEDICINE
Payer: MEDICAID

## 2023-07-18 PROCEDURE — 97140 MANUAL THERAPY 1/> REGIONS: CPT

## 2023-07-18 PROCEDURE — 97110 THERAPEUTIC EXERCISES: CPT

## 2023-07-18 NOTE — PROGRESS NOTES
Date of Service: 7/18/2023  Dx: Neck pain (M54.2), Cervical radiculopathy (M54.12)           DOI/S: 6/5/23     Insurance: [facility]  Insurance Limits: auth required  Visit #: 2  Authorized # of Visits: 9  POC/Auth Expiration: 9/10/23  Date of Last PN: 7/12/23 (Visit #1)  Authorizing Physician/Provider: Sergio Mares MD visit: N/A  Fall Risk: Standard         Precautions: None            Subjective: \"It's the same as last time. \" The patient reports no improvement after his first session. The patient reports that his exercises give him some relief, but it's only temporary. He reports pain over the left scapular area. He reports pain travelling as far distally as his right elbow, which is present right now. He has not yet purchased a massage ball for home use.     Pain/Symptom Presentation:   Pain at rest: 6/10  Pain at worst: 6/10    Objective:   SFMA Base Screen:   Cervical Flexion: DP  Cervical Extension: DN  Cervical Rotation: (R) DN, (L) DN    Cervical Spine SFMA Breakout  Active Supine Cervical Flexion (chin to chest): DN   Passive Supine Cervical Flexion (chin to chest): DN  Active Supine OA Cervical Flexion (20deg): FN  Active Supine Cervical Rot (80deg): (R) DN, (L) DN  Passive Supine Cervical Rot (80deg): (R) DN, (L) DN  C1-C2 Cervical Rot (40deg): (R) FN, (L) FN    Treatment:    Therapeutic Exercise: x 20min  HEP: half-kneeling thoracic rotation, S/L thoracic rotation, modified downward dog, doorway pec stretch, and self-massage with lacrosse/tennis ball  Prone scap retraction: 2 x 10 x 3\"   Prone OH cane/wand press: 1 x 10   Doorway pec stretch: x 30\"   S/L thoracic rotation: 1 x 10 (B) - required v/c form/technique  Modified downward dog: 1 x 10 - required v/c for form/techniquue  Half-kneeling thoracic rotation: 1 x 10 (B)     Manual Therapy: x 20min  TPR: (R) teres, lats, upper trap, levator, rhomboids  Cervical traction: 2 x 30\"   Soft tissue mobilization: cervical paraspinals, upper trap, levator  Thoracic PA accessory joint mobs - T1-T6: Grade 3, 4 x 10\" each    Provider Interactions With Patient:   The patient required correction for exercise form/technique with HEP. Assessment: Jeanette Mcdowell reports no change in symptoms or pain since his last appt. However, in reviewing his HEP exercises, the patient demonstrated incorrect form/technique that would not address his deficits. The patient was instructed in proper technique to ensure proper exercise performance at home. The patient was with mobility limitations for prone OH cane press and demonstrates some left thoracic rotation mobility deficits compared to right in a half-kneeling position. The patient would benefit from continued physical therapy for further progression in cervical and thoracic spine mobility and postural strength and endurance for long-term maintenance. 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 Timeframe: 2-3 weeks. The patient will improve cervical extension AROM to 55 deg pain-free to facilitate looking up for visualization while reaching overhead into upper cabinets. Timeframe: 2-3 weeks. Long-Term Goals:  Patient will improve cervical and shoulder pain and symptom management to facilitate sleeping through the night without interruption due to pain for adequate rest. Timeframe: 4-6 weeks. Patient will improve Neck Disability Index (NDI) score by 10 points or 10% to indicate a true change in improved function for ADL's and restoring PLF. Timeframe: 4-6 weeks. Plan: Review HEP regimen. HEP: Patient was issued a HEP handout 7/12/2023 including S/L thoracic rotation, modified downward dog, doorway pec stretch, and self-massage with lacrosse/tennis ball.        Charges: MT x 1, Therex x 2         Total Timed Treatment: 40min    Total Treatment Time: 40min

## 2023-07-20 ENCOUNTER — OFFICE VISIT (OUTPATIENT)
Dept: PHYSICAL THERAPY | Age: 63
End: 2023-07-20
Attending: FAMILY MEDICINE
Payer: MEDICAID

## 2023-07-20 DIAGNOSIS — E11.65 TYPE 2 DIABETES MELLITUS WITH HYPERGLYCEMIA, WITH LONG-TERM CURRENT USE OF INSULIN (HCC): ICD-10-CM

## 2023-07-20 DIAGNOSIS — Z79.4 TYPE 2 DIABETES MELLITUS WITH HYPERGLYCEMIA, WITH LONG-TERM CURRENT USE OF INSULIN (HCC): ICD-10-CM

## 2023-07-20 PROCEDURE — 97110 THERAPEUTIC EXERCISES: CPT

## 2023-07-20 PROCEDURE — 97140 MANUAL THERAPY 1/> REGIONS: CPT

## 2023-07-20 NOTE — PROGRESS NOTES
Date of Service: 7/20/2023  Dx: Neck pain (M54.2), Cervical radiculopathy (M54.12)           DOI/S: 6/5/23     Insurance: [facility]  Insurance Limits: auth required  Visit #: 3  Authorized # of Visits: 9  POC/Auth Expiration: 9/10/23  Date of Last PN: 7/12/23 (Visit #1)  Authorizing Physician/Provider: Clearnce Soulier Next MD visit: N/A  Fall Risk: Standard         Precautions: None            Subjective: The patient arrived 6 minutes late for his appt today. \"I'm feeling fine. \" The patient reports that he has modified his exercises as we discussed at his last session. He reports improved pain management in his shoulder, but is having more pain in his arm.      Pain/Symptom Presentation:   Pain at rest: 4/10  Pain at worst: 4/10    Objective:   SFMA Base Screen:   Cervical Flexion: DP  Cervical Extension: DN  Cervical Rotation: (R) DN, (L) DN    Cervical Spine SFMA Breakout  Active Supine Cervical Flexion (chin to chest): DN   Passive Supine Cervical Flexion (chin to chest): DN  Active Supine OA Cervical Flexion (20deg): FN  Active Supine Cervical Rot (80deg): (R) DN, (L) DN  Passive Supine Cervical Rot (80deg): (R) DN, (L) DN  C1-C2 Cervical Rot (40deg): (R) FN, (L) FN    Treatment:    Therapeutic Exercise: x 25min  HEP: half-kneeling thoracic rotation, S/L thoracic rotation, modified downward dog, doorway pec stretch, and self-massage with lacrosse/tennis ball  Prone OH cane/wand press: 1 x 10   Prone T: 2 x 10 (R), 3# DB  Prone Y: 2 x 10 (R), 3# DB  Doorway pec stretch: x 30\"   S/L thoracic rotation: 1 x 10 (B) - improved technique   Modified downward dog: 1 x 10 - improved technique   Half-kneeling thoracic rotation: 1 x 10 (B)  - required v/c for technique   Elastic band lateral wall walk: 1 lap x 15ft, yellow theraband  Elastic band (B) ER with OH press: 1 x 10 (B), yellow theraband     Manual Therapy: x 20min  TPR: (R) teres, lats, upper trap, levator, rhomboids  Cervical traction: 2 x 30\"   Soft tissue mobilization: cervical paraspinals, upper trap, levator  Thoracic PA accessory joint mobs - T1-T6: Grade 3, 4 x 10\" each    Provider Interactions With Patient:   The patient required correction for exercise form/technique with HEP. Assessment: Zaid Bryant reports some improvements in the scapular pain but is now with more pain travelling down the right arm. While the patient showed improved technique with the S/L thoracic rotation and modified downward dog, the patient seemed to be doing the half-kneeling thoracic rotation exercise incorrectly. We progress posterior shoulder and scapular stabilization strengthening. The patient would benefit from continued therapy for further progression in postural mobility, strength, and endurance. 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 Timeframe: 2-3 weeks. The patient will improve cervical extension AROM to 55 deg pain-free to facilitate looking up for visualization while reaching overhead into upper cabinets. Timeframe: 2-3 weeks. Long-Term Goals:  Patient will improve cervical and shoulder pain and symptom management to facilitate sleeping through the night without interruption due to pain for adequate rest. Timeframe: 4-6 weeks. Patient will improve Neck Disability Index (NDI) score by 10 points or 10% to indicate a true change in improved function for ADL's and restoring PLF. Timeframe: 4-6 weeks. Plan: Continue to progress postural mobility, strength, and endurance. HEP: Patient was issued a HEP handout 7/12/2023 including S/L thoracic rotation, modified downward dog, doorway pec stretch, and self-massage with lacrosse/tennis ball.        Charges: MT x 1, Therex x 2         Total Timed Treatment: 45min    Total Treatment Time: 45min

## 2023-07-22 DIAGNOSIS — Z79.4 TYPE 2 DIABETES MELLITUS WITH HYPERGLYCEMIA, WITH LONG-TERM CURRENT USE OF INSULIN (HCC): ICD-10-CM

## 2023-07-22 DIAGNOSIS — E11.65 TYPE 2 DIABETES MELLITUS WITH HYPERGLYCEMIA, WITH LONG-TERM CURRENT USE OF INSULIN (HCC): ICD-10-CM

## 2023-07-22 RX ORDER — GLIPIZIDE 10 MG/1
10 TABLET ORAL
Qty: 180 TABLET | Refills: 0 | Status: SHIPPED | OUTPATIENT
Start: 2023-07-22

## 2023-07-22 RX ORDER — GLIPIZIDE 10 MG/1
10 TABLET ORAL
Qty: 60 TABLET | Refills: 0 | Status: SHIPPED | OUTPATIENT
Start: 2023-07-22 | End: 2023-07-22

## 2023-07-24 ENCOUNTER — OFFICE VISIT (OUTPATIENT)
Dept: PHYSICAL THERAPY | Age: 63
End: 2023-07-24
Attending: FAMILY MEDICINE
Payer: MEDICAID

## 2023-07-24 PROCEDURE — 97110 THERAPEUTIC EXERCISES: CPT

## 2023-07-24 PROCEDURE — 97140 MANUAL THERAPY 1/> REGIONS: CPT

## 2023-07-24 NOTE — PROGRESS NOTES
Date of Service: 7/24/2023  Dx: Neck pain (M54.2), Cervical radiculopathy (M54.12)           DOI/S: 6/5/23     Insurance: [facility]  Insurance Limits: auth required  Visit #: 4  Authorized # of Visits: 9  POC/Auth Expiration: 9/10/23  Date of Last PN: 7/12/23 (Visit #1)  Authorizing Physician/Provider: Shannon Mares MD visit: N/A  Fall Risk: Standard         Precautions: None            Subjective: The patient arrived 5 minutes late for his appt today. \"It's been better but I'm still having pain down the arm. He reports localized pressure to the touch in the elbow. \" He reports tenderness to the touch that refers up in the upper arm. \"I still take pain killers when the pain gets excruciating. I'm able to do more and lift. \" He reports pain when he first wakes up in the morning. \"My arm feels heavy. \"     The patient still has not yet purchased the massage ball for home use.       Pain/Symptom Presentation:   Pain at rest: 4/10 (no change)   Pain at worst: 4/10    Objective:   SFMA Base Screen:   Cervical Flexion: DP  Cervical Extension: DN  Cervical Rotation: (R) DN, (L) DN    Cervical Spine SFMA Breakout  Active Supine Cervical Flexion (chin to chest): DN   Passive Supine Cervical Flexion (chin to chest): DN  Active Supine OA Cervical Flexion (20deg): FN  Active Supine Cervical Rot (80deg): (R) DN, (L) DN  Passive Supine Cervical Rot (80deg): (R) DN, (L) DN  C1-C2 Cervical Rot (40deg): (R) FN, (L) FN    Treatment:    Therapeutic Exercise: x 20min  HEP: half-kneeling thoracic rotation, S/L thoracic rotation, modified downward dog, doorway pec stretch, and self-massage with lacrosse/tennis ball  Assessed patient's right posterior elbow pain  Prone OH cane/wand press: 1 x 10   Prone T: 2 x 10 (R), 3# DB  Prone Y: 2 x 10 (R), 3# DB  Modified downward dog: 1 x 10  Half-kneeling thoracic rotation: 1 x 10 (B)  - required v/c for technique, cues to hold stretch     Manual Therapy: x 20min  TPR: (R) teres, lats, upper trap, levator, rhomboids  Cervical traction: 2 x 30\"   Soft tissue mobilization: cervical paraspinals, upper trap, levator  Thoracic PA accessory joint mobs - T1-T6: Grade 3, 4 x 10\" each    Provider Interactions With Patient:   The patient required correction for exercise form/technique with HEP. Assessment: Guy Masterson arrives today with some reports of posterior elbow pain that is TTP. The patient localized pain over the posteriolateral elbow, near the olecranon process. Provocative testing does not suggest any kind of tendinitis with the elbow, the patient is not with TTP over the forearm musculature. 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 The patient still has not yet purchased a massage ball for home use and was encouraged to do so if he feels better after TPR performed in-session. We updated the patient's HEP to include posterior scapular strengthening exercises as well. 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 Timeframe: 2-3 weeks. The patient will improve cervical extension AROM to 55 deg pain-free to facilitate looking up for visualization while reaching overhead into upper cabinets. Timeframe: 2-3 weeks. Long-Term Goals:  Patient will improve cervical and shoulder pain and symptom management to facilitate sleeping through the night without interruption due to pain for adequate rest. Timeframe: 4-6 weeks. Patient will improve Neck Disability Index (NDI) score by 10 points or 10% to indicate a true change in improved function for ADL's and restoring PLF. Timeframe: 4-6 weeks. Plan: Follow up on tolerance to updated HEP, right posterior elbow pain, and purchasing massage ball for home.      HEP: Patient was issued a HEP handout 7/24/2023 including modified downward dog, half-kneeling rotation, prone Y and T, and self-massage with lacrosse/tennis ball.        Charges: MT x 1, Therex x 2         Total Timed Treatment: 45min    Total Treatment Time: 45min

## 2023-07-24 NOTE — PATIENT INSTRUCTIONS
Thank you for coming to your physical therapy appt! During your appt today you were issued a HEP handout from [email or web address] which can also be accessed using the information below. The exercises chosen are meant to supplement the treatment you received and reinforce the progress made in physical therapy. It is important to stay consistent with your exercises to help facilitate and maximize your recovery! View at [email or web address] using code: Naaman Even

## 2023-07-26 ENCOUNTER — APPOINTMENT (OUTPATIENT)
Dept: PHYSICAL THERAPY | Age: 63
End: 2023-07-26
Attending: FAMILY MEDICINE
Payer: MEDICAID

## 2023-07-26 NOTE — PROGRESS NOTES
Date of Service: 7/26/2023  Dx: Neck pain (M54.2), Cervical radiculopathy (M54.12)           DOI/S: 6/5/23     Insurance: [facility]  Insurance Limits: auth required  Visit #: 5  Authorized # of Visits: 9  POC/Auth Expiration: 9/10/23  Date of Last PN: 7/12/23 (Visit #1)  Authorizing Physician/Provider: Eloy Mares MD visit: N/A  Fall Risk: Standard         Precautions: None            Subjective: The patient arrived 5 minutes late for his appt today. \"    Pain/Symptom Presentation:   Pain at rest: 4/10 (no change)   Pain at worst: 4/10    Objective:   SFMA Base Screen:   Cervical Flexion: DP  Cervical Extension: DN  Cervical Rotation: (R) DN, (L) DN    Cervical Spine SFMA Breakout  Active Supine Cervical Flexion (chin to chest): DN   Passive Supine Cervical Flexion (chin to chest): DN  Active Supine OA Cervical Flexion (20deg): FN  Active Supine Cervical Rot (80deg): (R) DN, (L) DN  Passive Supine Cervical Rot (80deg): (R) DN, (L) DN  C1-C2 Cervical Rot (40deg): (R) FN, (L) FN    Treatment:    Therapeutic Exercise: x 20min  HEP: half-kneeling thoracic rotation, S/L thoracic rotation, modified downward dog, doorway pec stretch, and self-massage with lacrosse/tennis ball  Assessed patient's right posterior elbow pain  Prone OH cane/wand press: 1 x 10   Prone T: 2 x 10 (R), 3# DB  Prone Y: 2 x 10 (R), 3# DB  Modified downward dog: 1 x 10  Half-kneeling thoracic rotation: 1 x 10 (B)  - required v/c for technique, cues to hold stretch     Manual Therapy: x 20min  TPR: (R) teres, lats, upper trap, levator, rhomboids  Cervical traction: 2 x 30\"   Soft tissue mobilization: cervical paraspinals, upper trap, levator  Thoracic PA accessory joint mobs - T1-T6: Grade 3, 4 x 10\" each    Provider Interactions With Patient:   The patient required correction for exercise form/technique with HEP. Assessment: Andrea Parr arrives today with some reports of posterior elbow pain that is TTP.  The patient localized pain over the posteriolateral elbow, near the olecranon process. Provocative testing does not suggest any kind of tendinitis with the elbow, the patient is not with TTP over the forearm musculature. 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 The patient still has not yet purchased a massage ball for home use and was encouraged to do so if he feels better after TPR performed in-session. We updated the patient's HEP to include posterior scapular strengthening exercises as well. 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 Timeframe: 2-3 weeks. The patient will improve cervical extension AROM to 55 deg pain-free to facilitate looking up for visualization while reaching overhead into upper cabinets. Timeframe: 2-3 weeks. Long-Term Goals:  Patient will improve cervical and shoulder pain and symptom management to facilitate sleeping through the night without interruption due to pain for adequate rest. Timeframe: 4-6 weeks. Patient will improve Neck Disability Index (NDI) score by 10 points or 10% to indicate a true change in improved function for ADL's and restoring PLF. Timeframe: 4-6 weeks. Plan: Follow up on tolerance to updated HEP, right posterior elbow pain, and purchasing massage ball for home. HEP: Patient was issued a HEP handout 7/24/2023 including modified downward dog, half-kneeling rotation, prone Y and T, and self-massage with lacrosse/tennis ball.        Charges: MT x 1, Therex x 2         Total Timed Treatment: 45min    Total Treatment Time: 45min

## 2023-07-31 ENCOUNTER — OFFICE VISIT (OUTPATIENT)
Dept: PHYSICAL THERAPY | Age: 63
End: 2023-07-31
Attending: FAMILY MEDICINE
Payer: MEDICAID

## 2023-07-31 ENCOUNTER — TELEPHONE (OUTPATIENT)
Dept: PHYSICAL THERAPY | Age: 63
End: 2023-07-31

## 2023-07-31 PROCEDURE — 97140 MANUAL THERAPY 1/> REGIONS: CPT

## 2023-07-31 NOTE — PROGRESS NOTES
Date of Service: 7/31/2023  Dx: Neck pain (M54.2), Cervical radiculopathy (M54.12)           DOI/S: 6/5/23     Insurance: [facility]  Insurance Limits: auth required   Visit #: 5  Authorized # of Visits: 9  POC/Auth Expiration: 9/10/23  Date of Last PN: 7/12/23 (Visit #1)  Authorizing Physician/Provider: Eloy Mares MD visit: N/A  Fall Risk: Standard         Precautions: None            Subjective: The patient arrived 20 minutes late for his appt today. \"The pain resumed in the front of the chest today. I did some vigorous exercise over the weekend lifting weights. \" The patient reports that the pain in his elbow has subsided. Pain/Symptom Presentation:   Not assessed this session    Objective:     Cervical Spine SFMA Breakout  Active Supine Cervical Flexion (chin to chest): DN   Passive Supine Cervical Flexion (chin to chest): DN  C1-C2 Cervical Rot (40deg): (R) FN, (L) FN    Treatment:    Therapeutic Exercise: x 5min  HEP: half-kneeling thoracic rotation, S/L thoracic rotation, modified downward dog, doorway pec stretch, and self-massage with lacrosse/tennis ball  Assessed patient's right posterior elbow pain  Prone OH cane/wand press: 1 x 10   Doorway 90-90 pec stretch: x 30\"     Manual Therapy: x 20min  TPR: (R) teres, lats, upper trap, levator, rhomboids  Cervical traction: 2 x 30\"   Soft tissue mobilization: cervical paraspinals, upper trap, levator  Thoracic PA accessory joint mobs - T1-T6: Grade 3, 4 x 10\" each    Provider Interactions With Patient:   Reminded patient of next appt date/time. Assessment: Andrea Parr continues to struggle to attend his therapy appts in a timely manner which limits the available time we have for treatment. The patient reports improved elbow pain since his last appt, but does have some complaints of increased anterior chest pain.  Andrea Parr resumed some recreational weight lifting over the weekend, so some of his complaints may be DOMS as a result of his workout. The patient would benefit from continued therapy for further progression in postural mobility and strengthening. 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 Timeframe: 2-3 weeks. The patient will improve cervical extension AROM to 55 deg pain-free to facilitate looking up for visualization while reaching overhead into upper cabinets. Timeframe: 2-3 weeks. Long-Term Goals:  Patient will improve cervical and shoulder pain and symptom management to facilitate sleeping through the night without interruption due to pain for adequate rest. Timeframe: 4-6 weeks. Patient will improve Neck Disability Index (NDI) score by 10 points or 10% to indicate a true change in improved function for ADL's and restoring PLF. Timeframe: 4-6 weeks. Plan: Follow up on pain management. HEP: Patient was issued a HEP handout 7/24/2023 including modified downward dog, half-kneeling rotation, prone Y and T, and self-massage with lacrosse/tennis ball.        Charges: MT x 2         Total Timed Treatment: 25min    Total Treatment Time: 25min

## 2023-08-02 ENCOUNTER — OFFICE VISIT (OUTPATIENT)
Dept: PHYSICAL THERAPY | Age: 63
End: 2023-08-02
Attending: FAMILY MEDICINE
Payer: MEDICAID

## 2023-08-02 PROCEDURE — 97110 THERAPEUTIC EXERCISES: CPT

## 2023-08-02 PROCEDURE — 97140 MANUAL THERAPY 1/> REGIONS: CPT
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
## 2023-08-04 ENCOUNTER — TELEPHONE (OUTPATIENT)
Dept: PHYSICAL THERAPY | Facility: HOSPITAL | Age: 63
End: 2023-08-04

## 2023-08-04 ENCOUNTER — TELEPHONE (OUTPATIENT)
Dept: FAMILY MEDICINE CLINIC | Facility: CLINIC | Age: 63
End: 2023-08-04

## 2023-08-04 NOTE — TELEPHONE ENCOUNTER
I will increase his Lantus to 20 units nightly and have him contact the office with his blood sugars in 2 weeks.

## 2023-08-04 NOTE — TELEPHONE ENCOUNTER
Pt calling to inform Dr. Rubén Ramos his average blood sugar readings have been 180. Pt was asked by Dr. Rubén Ramos to call back with the information because she may change his dosing he said. Verified pharmacy on file. Please advise.

## 2023-08-07 ENCOUNTER — APPOINTMENT (OUTPATIENT)
Dept: PHYSICAL THERAPY | Age: 63
End: 2023-08-07
Attending: FAMILY MEDICINE
Payer: MEDICAID

## 2023-08-24 ENCOUNTER — OFFICE VISIT (OUTPATIENT)
Dept: PHYSICAL THERAPY | Age: 63
End: 2023-08-24
Attending: FAMILY MEDICINE
Payer: MEDICAID

## 2023-08-24 PROCEDURE — 97140 MANUAL THERAPY 1/> REGIONS: CPT

## 2023-08-24 PROCEDURE — 97110 THERAPEUTIC EXERCISES: CPT

## 2023-08-24 NOTE — PATIENT INSTRUCTIONS
Thank you for coming to your physical therapy appt! To ensure a full recovery, please call our scheduling center at [phone] to schedule the remaining 2 physical therapy visits we have authorized by your insurance company. Thank you for coming to your physical therapy appt! During your appt today you were issued a HEP handout from [employer] which can also be accessed using the information below. The exercises chosen are meant to supplement the treatment you received and reinforce the progress made in physical therapy. It is important to stay consistent with your exercises to help facilitate and maximize your recovery! View at [email or web address]-[email or web address] using code: Lia Cardona

## 2023-08-24 NOTE — PROGRESS NOTES
Date of Service: 8/24/2023  Dx: Neck pain (M54.2), Cervical radiculopathy (M54.12)           DOI/S: 6/5/23     Insurance: [facility]  Insurance Limits: auth required   Visit #: 7  Authorized # of Visits: 9  POC/Auth Expiration: 9/10/23  Date of Last PN: 7/12/23 (Visit #1)  Authorizing Physician/Provider: Alexis Mares MD visit: N/A  Fall Risk: Standard         Precautions: None            Subjective: \"For some reason, I felt a lot of pain yesterday, but I did some weight lifting. I would like to feel much stronger. I am still having numbness in his index and middle finger. \" The patient reports that the exercises have really helped. Objective:     Pain/Symptom Presentation:   Pain at rest: 0/10  Pain at worst: 3/10    Treatment:    Therapeutic Exercise: x 15min  HEP: half-kneeling thoracic rotation, S/L thoracic rotation, modified downward dog, doorway pec stretch, and self-massage with lacrosse/tennis ball  Prone OH cane/wand press: 2 x 10   Prone T: 2 x 10 (B), 3# DB  Prone Y: 2 x 10 (B), 3# DB  Elastic band (B) ER with OH press: 2 x10 (B), red theraband   Elastic band lateral wall walk: 2 laps x 15ft, red theraband - difficult, required cueing to keep elbows straight  Power band resisted snow angels: 2 x 10 (B), orange and black power band - required cues to avoid trunk extension  HEP update: Reviewed new HEP handout with new exercises and progressions, provided verbal and written instructions/cueing for proper technique and common errors/compensations as needed. Reviewed the importance of compliance with home exercise program to facilitate recovery and meet long-term goals.       Manual Therapy: x 20min  TPR: (R) teres, lats, upper trap, levator, rhomboids  Cervical traction: 2 x 30\"   Cervical upglide joint mobs - C3-C8: grade 3, 4 x 10\" each  Soft tissue mobilization: cervical paraspinals, upper trap, levator  Thoracic PA accessory joint mobs - T1-T6: Grade 3, 4 x 10\" each    Provider Interactions With Patient:   Reminded patient of next appt date/time. Assessment: Julianne Jeffrey arrives today after a 3-week timeline since his last appt. The patient reports very good improvement in his symptoms but still reports a small remaining amount of numbness in his second and third fingers on his right side. He also reports that he would like to see his strength improve further. The patient reports that the exercises have been helping quite a bit. We progressed postural strengthening exercises this date and issued an updated HEP handout. The patient did demonstrate some compensations during his exercise performance as noted above, but even after demonstration and cueing, he continued to demonstrate compensations. The patient has two remaining visits authorized by the insurance company which he was instructed to schedule at his convenience. We will continue to progress postural mobility and strengthening over the next two sessions in preparation for progression to discharge. 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 Timeframe: 2-3 weeks. The patient will improve cervical extension AROM to 55 deg pain-free to facilitate looking up for visualization while reaching overhead into upper cabinets. Timeframe: 2-3 weeks. Long-Term Goals:  Patient will improve cervical and shoulder pain and symptom management to facilitate sleeping through the night without interruption due to pain for adequate rest. Timeframe: 4-6 weeks. Patient will improve Neck Disability Index (NDI) score by 10 points or 10% to indicate a true change in improved function for ADL's and restoring PLF. Timeframe: 4-6 weeks. Plan: Follow up on tolerance to updated HEP.      HEP: Patient was issued a HEP handout 8/24/23 including half-kneeling rotation, modified downward dog, prone Y and T, elastic band (B) ER with OH pres, and elastic band lateral wall walk.        Charges: MT x 1, Therex x 1    Total Timed Treatment: 35min    Total Treatment Time: 35min

## 2023-08-28 ENCOUNTER — OFFICE VISIT (OUTPATIENT)
Dept: PHYSICAL THERAPY | Age: 63
End: 2023-08-28
Attending: FAMILY MEDICINE
Payer: MEDICAID

## 2023-08-28 PROCEDURE — 97140 MANUAL THERAPY 1/> REGIONS: CPT

## 2023-08-28 PROCEDURE — 97110 THERAPEUTIC EXERCISES: CPT

## 2023-08-28 NOTE — PROGRESS NOTES
PHYSICAL THERAPY DISCHARGE:      Date of Service: 8/28/2023  Dx: Neck pain (M54.2), Cervical radiculopathy (M54.12)           DOI/S: 6/5/23     Insurance: [facility]  Insurance Limits: auth required   Visit #: 8  Authorized # of Visits: 9  POC/Auth Expiration: 9/10/23  Date of Last PN: 7/12/23 (Visit #1)  Authorizing Physician/Provider: Issac Mares MD visit: N/A  Fall Risk: Standard         Precautions: None            Subjective: \"The pain is back. I followed the exercises you gave me. I did them routinely, and all of a sudden, the came back in the arm. It's not excruciating, I can still sleep, but the pain is back in the arm. \" The patient reports that he had pain after his last therapy session. \"I've never been in such pain constantly for two months now. \"     Objective:     Pain/Symptom Presentation:   Pain at rest: 3-4/10  Pain at worst: 6/10    Activity Measures:  Headaches: No Activity Limitation: Patient is without complaints of headache. Dizziness: No Activity Limitation: Patient is without dizziness. Sleeping: Mild Activity Limitation: Patient reports that occasional interruptions in sleeping due to pain. Sitting: No Activity Limitation: Patient is able to sit without limitations. Checking Blindspot While Driving: Mild Activity Limitation: Patient reports some pain with cervical rotation. Carrying: No Activity Limitation: Patient is able to carry, lift, push, and pull heavy objects without increased pain. Lifting: No Activity Limitation: Patient is able to carry, lift, push, and pull heavy objects without increased pain. Pushing: No Activity Limitation: Patient is able to carry, lift, push, and pull heavy objects without increased pain. Pulling: No Activity Limitation: Patient is able to carry, lift, push, and pull heavy objects without increased pain.      ROM:        Cervical Motion AROM    Right Left   Cervical Flexion 40   Cervical Extension 30   Cervical Side Bending 20 20   Cervical Rotation 55 50   *indicates activity was associated with pain     Special Tests:   Neck Special Tests:   Ligamentous:  Vertebral Artery Clearing Test: (R) (-), (L) (-)  Sharp-Edenilson Test: (R) (-), (L) (-)  Transverse Ligament Test: (R) (-), (L) (-)  Upper Limb Tension Tests:   Median Nerve Tension: (R) (-), (L) (-)  Radial Nerve Tension: (R) (-), (L) (-)  Ulnar Nerve Tension: (R) (-), (L) (-)      Shoulder Special Tests:  TYSHAWN:   Neer: (R) (-), (L) (-)  Hawkin's Kevin: (R) (-), (L) (-)  Mazion Shoulder Maneuver (FMS): (R) (-), (L) (-)  Shanon Test/Empty Can: (R) (-), (L) (-)  Shoulder Shrug Sign: (R) (-), (L) (-)  Rotator Cuff:   Painful Arc Sign: (R) (-), (L) (-)  Drop Arm Sign (full thickness): (R) (-), (L) (-)  Shanon Test/Empty Can (supra): (R) (-), (L) (-)  ER Lag Sign 45deg ABD (infra): (R) (-), (L) (-)    Treatment:    Therapeutic Exercise: x 15min  Prone T: 2 x 10 (B), 3# DB  Prone Y: 2 x 10 (B), 3# DB - cues to slow down  Modified downward dog: 1 x 10 x 3\" - required verbal cues to keep elbow straight   Elastic band (B) ER with OH press: 2 x10 (B), red theraband   HEP update: Reviewed new HEP handout with new exercises and progressions, provided verbal and written instructions/cueing for proper technique and common errors/compensations as needed. Reviewed the importance of compliance with home exercise program to facilitate recovery and meet long-term goals. Manual Therapy: x 20min  TPR: (R) teres, lats, upper trap, levator, rhomboids  Cervical traction: 2 x 30\"   Cervical upglide joint mobs - C3-C8: grade 3, 4 x 10\" each  Soft tissue mobilization: cervical paraspinals, upper trap, levator  Thoracic PA accessory joint mobs - T1-T6: Grade 3, 4 x 10\" each    Assessment: Margarette Crawford is a 61year old male that presented to physical therapy evaluation with onset of neck and upper back pain after lifting a heavy suitcase at work, where he works as a .  The patient has been seen for 8 sessions in physical therapy over the past two months. The patient had been making good improvements in his pain and symptom management. However, recently, the patient reports a return in his symptoms. Due to a progression in the patient's symptoms despite physical therapy attendance and HEP compliance, we are recommending the patient follow up with his MD for additional evaluation and treatment options. The patient still requires cueing to make sure he is performing exercise at an intentional speed as well as with correct form/technique. We modified his HEP, resuming previous exercise routine to avoid further exacerbation. The patient will be discharged from therapy at this time to follow up with MD.     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 Timeframe: 2-3 weeks. The patient will improve cervical extension AROM to 55 deg pain-free to facilitate looking up for visualization while reaching overhead into upper cabinets. Timeframe: 2-3 weeks. Long-Term Goals:  Patient will improve cervical and shoulder pain and symptom management to facilitate sleeping through the night without interruption due to pain for adequate rest. Timeframe: 4-6 weeks. Patient will improve Neck Disability Index (NDI) score by 10 points or 10% to indicate a true change in improved function for ADL's and restoring PLF. Timeframe: 4-6 weeks. Plan: Patient will be discharged from therapy to continue with HEP. HEP: Patient was issued a HEP handout 8/24/23 including half-kneeling rotation, modified downward dog, prone Y and T.       Charges: MT x 1, Therex x 1    Total Timed Treatment: 35min    Total Treatment Time: 35min

## 2023-09-05 ENCOUNTER — TELEPHONE (OUTPATIENT)
Dept: FAMILY MEDICINE CLINIC | Facility: CLINIC | Age: 63
End: 2023-09-05

## 2023-09-05 DIAGNOSIS — M54.9 BACK PAIN, UNSPECIFIED BACK LOCATION, UNSPECIFIED BACK PAIN LATERALITY, UNSPECIFIED CHRONICITY: ICD-10-CM

## 2023-09-05 DIAGNOSIS — M54.12 CERVICAL RADICULOPATHY: Primary | ICD-10-CM

## 2023-09-05 NOTE — TELEPHONE ENCOUNTER
Patient requesting referral for chiropractor for neck and back pain. Has completed PT and has not noticed an improvement. He has been seeing a chiropractor but is paying out of pocket. Chiro told patient he could help neck/back pain. Patient advised to call insurance provider for covered services, chiro may not be covered. Or, if chiro is covered, ask for a list of in-network providers. Verbalized understanding. Any additional recommendations for continued back/neck pain?  _________________________________   Patient received a new prescription for Lantus, 15 units nightly at his visit with you on 6/14/23. He notified the office on 8/4 that his blood sugar readings were averaging 180. The Lantus was increased to 20 units nightly. Patient reported he has been injecting 10 units nightly since June. His average daily blood sugar reading since 8/4 is between 156-205. Patient advised to start injecting the original dose of 15 units tonight and await further instruction from Dr. Akash Neri, verbalized understanding. 3-month f/u appt scheduled.

## 2023-09-05 NOTE — TELEPHONE ENCOUNTER
Patient has finished his Physical Therapy and is still having back pain & numbness. Would like to know what next steps would be  Please advise.

## 2023-09-05 NOTE — TELEPHONE ENCOUNTER
Patient can be referred to the pain clinic to see if he is a candidate for injections. He can be referred to a chiropractor if his insurance covers it. He does need to provide us with the chiropractor in his network. Yes the patient should be using Lantus 15 units nightly. Thank you for instructing the patient and setting up his follow up appointment.

## 2023-09-06 NOTE — TELEPHONE ENCOUNTER
Spoke to pt with instructions and he said he would prefer pain management. Referral entered and number given to him to call to make appt.     He verbalized understanding about the lantus

## 2023-09-13 ENCOUNTER — APPOINTMENT (OUTPATIENT)
Dept: PHYSICAL THERAPY | Age: 63
End: 2023-09-13
Payer: MEDICAID

## 2023-09-14 DIAGNOSIS — E55.9 VITAMIN D DEFICIENCY: ICD-10-CM

## 2023-09-14 RX ORDER — ERGOCALCIFEROL 1.25 MG/1
50000 CAPSULE ORAL WEEKLY
Qty: 12 CAPSULE | Refills: 0 | OUTPATIENT
Start: 2023-09-14

## 2023-09-20 ENCOUNTER — OFFICE VISIT (OUTPATIENT)
Dept: PAIN CLINIC | Facility: CLINIC | Age: 63
End: 2023-09-20
Payer: MEDICAID

## 2023-09-20 VITALS
OXYGEN SATURATION: 97 % | DIASTOLIC BLOOD PRESSURE: 62 MMHG | HEART RATE: 86 BPM | BODY MASS INDEX: 24 KG/M2 | WEIGHT: 175 LBS | SYSTOLIC BLOOD PRESSURE: 118 MMHG

## 2023-09-20 DIAGNOSIS — M54.12 CERVICAL RADICULOPATHY: Primary | ICD-10-CM

## 2023-09-20 DIAGNOSIS — E78.2 MIXED HYPERLIPIDEMIA: ICD-10-CM

## 2023-09-20 PROCEDURE — 99204 OFFICE O/P NEW MOD 45 MIN: CPT | Performed by: ANESTHESIOLOGY

## 2023-09-20 PROCEDURE — 3078F DIAST BP <80 MM HG: CPT | Performed by: ANESTHESIOLOGY

## 2023-09-20 PROCEDURE — 3074F SYST BP LT 130 MM HG: CPT | Performed by: ANESTHESIOLOGY

## 2023-09-20 RX ORDER — GABAPENTIN 100 MG/1
100 CAPSULE ORAL 3 TIMES DAILY
Qty: 90 CAPSULE | Refills: 0 | Status: SHIPPED | OUTPATIENT
Start: 2023-09-20

## 2023-09-20 RX ORDER — METHYLPREDNISOLONE 4 MG/1
TABLET ORAL
Qty: 1 EACH | Refills: 0 | Status: SHIPPED | OUTPATIENT
Start: 2023-09-20

## 2023-09-20 RX ORDER — CYCLOBENZAPRINE HCL 10 MG
10 TABLET ORAL NIGHTLY PRN
COMMUNITY
Start: 2023-08-01

## 2023-09-20 RX ORDER — ATORVASTATIN CALCIUM 20 MG/1
20 TABLET, FILM COATED ORAL NIGHTLY
Qty: 90 TABLET | Refills: 0 | Status: SHIPPED | OUTPATIENT
Start: 2023-09-20

## 2023-09-20 RX ORDER — IBUPROFEN 600 MG/1
600 TABLET ORAL EVERY 6 HOURS PRN
COMMUNITY
Start: 2023-08-01

## 2023-09-20 NOTE — PROGRESS NOTES
Location of Pain: neck radiating down right shoulder    Date Pain Began: 6/6/23          Work Related:   Yes        Receiving Work Comp/Disability:   Yes    Numeric Rating Scale:  Pain at Present:  10                                                                                                            (No Pain) 0  to  10 (Worst Pain)                 Minimum Pain:   10  Maximum Pain  10    Distribution of Pain:    right    Quality of Pain:   numbness, sharp/stabbing, and tingling    Origin of Pain:    Lifting    Aggravating Factors:    Sitting    Past Treatments for Current Pain Condition:   Physical Therapy    Prior diagnostic testing for your pain:  XRay

## 2023-09-20 NOTE — PROGRESS NOTES
Patient presents in office today with reported pain in neck to right arm, fingertips are numb. Pain also in right chest area.     Current pain level reported = 10/10    Last reported dose of cyclobenzaprine and ibuprophen 600mg in the morning      Narcotic Contract renewal NA    Urine Drug screen NA

## 2023-09-25 ENCOUNTER — TELEPHONE (OUTPATIENT)
Dept: FAMILY MEDICINE CLINIC | Facility: CLINIC | Age: 63
End: 2023-09-25

## 2023-09-25 NOTE — TELEPHONE ENCOUNTER
Patient called requesting a referral for Ortho speciality to see Dr. Aki Lion in regards to continuing neck pain. LOV: 7/13/2023  Has this issue been discussed with PCP previously (Yes/No): Yes  Number of visits Requesting: N/A  Does patient have an HMO or PPO plan: [employer]  PH: [phone]    Informed patient may take up to 5 - 7 business days to complete. Once referral is approved patient will be able to see via [address]

## 2023-09-25 NOTE — TELEPHONE ENCOUNTER
Mailbox full, unable to leave message. Ortho referral previously placed for Dr. Kamron Whatley. Need to verify with patient if he confirmed Dr. Inocencio Dooley is in network with his plan.

## 2023-09-29 ENCOUNTER — TELEPHONE (OUTPATIENT)
Dept: FAMILY MEDICINE CLINIC | Facility: CLINIC | Age: 63
End: 2023-09-29

## 2023-09-29 NOTE — TELEPHONE ENCOUNTER
Spoke to patient. Relayed information regarding authorized orthopedic referral to Dr Ny Mcleod. Pt confirmed understanding and will schedule appt.

## 2023-09-29 NOTE — TELEPHONE ENCOUNTER
Patient returning nurse call regarding orthopedic referral - if you could call him after 12pm today  Please advise.

## 2023-10-04 ENCOUNTER — TELEPHONE (OUTPATIENT)
Dept: ORTHOPEDICS CLINIC | Facility: CLINIC | Age: 63
End: 2023-10-04

## 2023-10-04 NOTE — TELEPHONE ENCOUNTER
Patient is scheduled with Dr. Boris Chowdhury for cervical radiculopathy. Please advise if imaging is needed.

## 2023-10-06 ENCOUNTER — TELEPHONE (OUTPATIENT)
Dept: PHYSICAL THERAPY | Age: 63
End: 2023-10-06

## 2023-10-06 ENCOUNTER — APPOINTMENT (OUTPATIENT)
Dept: PHYSICAL THERAPY | Age: 63
End: 2023-10-06
Attending: ANESTHESIOLOGY
Payer: MEDICAID

## 2023-10-09 ENCOUNTER — TELEPHONE (OUTPATIENT)
Dept: PHYSICAL THERAPY | Facility: HOSPITAL | Age: 63
End: 2023-10-09

## 2023-10-09 ENCOUNTER — APPOINTMENT (OUTPATIENT)
Dept: PHYSICAL THERAPY | Age: 63
End: 2023-10-09
Attending: ANESTHESIOLOGY
Payer: MEDICAID

## 2023-10-12 ENCOUNTER — APPOINTMENT (OUTPATIENT)
Dept: PHYSICAL THERAPY | Age: 63
End: 2023-10-12
Attending: ANESTHESIOLOGY
Payer: MEDICAID

## 2023-10-16 ENCOUNTER — OFFICE VISIT (OUTPATIENT)
Dept: FAMILY MEDICINE CLINIC | Facility: CLINIC | Age: 63
End: 2023-10-16
Payer: MEDICAID

## 2023-10-16 ENCOUNTER — APPOINTMENT (OUTPATIENT)
Dept: PHYSICAL THERAPY | Age: 63
End: 2023-10-16
Attending: ANESTHESIOLOGY
Payer: MEDICAID

## 2023-10-16 VITALS
TEMPERATURE: 97 F | SYSTOLIC BLOOD PRESSURE: 100 MMHG | HEIGHT: 71 IN | WEIGHT: 170 LBS | BODY MASS INDEX: 23.8 KG/M2 | RESPIRATION RATE: 18 BRPM | DIASTOLIC BLOOD PRESSURE: 58 MMHG | OXYGEN SATURATION: 99 % | HEART RATE: 77 BPM

## 2023-10-16 DIAGNOSIS — Z23 NEED FOR INFLUENZA VACCINATION: ICD-10-CM

## 2023-10-16 DIAGNOSIS — N40.1 BENIGN PROSTATIC HYPERPLASIA (BPH) WITH STRAINING ON URINATION: ICD-10-CM

## 2023-10-16 DIAGNOSIS — M54.12 CERVICAL RADICULOPATHY: ICD-10-CM

## 2023-10-16 DIAGNOSIS — R39.16 BENIGN PROSTATIC HYPERPLASIA (BPH) WITH STRAINING ON URINATION: ICD-10-CM

## 2023-10-16 DIAGNOSIS — E11.65 TYPE 2 DIABETES MELLITUS WITH HYPERGLYCEMIA, WITH LONG-TERM CURRENT USE OF INSULIN (HCC): Primary | ICD-10-CM

## 2023-10-16 DIAGNOSIS — E78.2 MIXED HYPERLIPIDEMIA: ICD-10-CM

## 2023-10-16 DIAGNOSIS — Z79.4 TYPE 2 DIABETES MELLITUS WITH HYPERGLYCEMIA, WITH LONG-TERM CURRENT USE OF INSULIN (HCC): Primary | ICD-10-CM

## 2023-10-16 LAB
CARTRIDGE LOT#: 620 NUMERIC
HEMOGLOBIN A1C: 9 % (ref 4.3–5.6)

## 2023-10-16 RX ORDER — INSULIN GLARGINE 100 [IU]/ML
25 INJECTION, SOLUTION SUBCUTANEOUS NIGHTLY
Qty: 15 ML | Refills: 1 | Status: SHIPPED | OUTPATIENT
Start: 2023-10-16

## 2023-10-16 RX ORDER — TAMSULOSIN HYDROCHLORIDE 0.4 MG/1
0.4 CAPSULE ORAL DAILY
Qty: 90 CAPSULE | Refills: 1 | Status: SHIPPED | OUTPATIENT
Start: 2023-10-16

## 2023-10-16 RX ORDER — TAMSULOSIN HYDROCHLORIDE 0.4 MG/1
0.4 CAPSULE ORAL DAILY
Qty: 30 CAPSULE | Refills: 0 | Status: SHIPPED | OUTPATIENT
Start: 2023-10-16 | End: 2023-10-16

## 2023-10-16 NOTE — PATIENT INSTRUCTIONS
I am increasing the Lantus to 25 units nightly. Continue to check your blood sugars. Contact the office if you blood sugar is under 70. Continue the rest of your medications as prescribed. Keep your appointment for your colonoscopy as scheduled on 11/2/2023. Keep your appointment with your eye doctor as scheduled. Ask your doctor to send me a copy of his note. Keep your appointment with Dr. Autumn Kauffman. You received the flu vaccine today. You may run a low grade fever, have mild redness or swelling at the site of the shot, muscle pain at the site of the shot for the next 2-3 days. You may take Tylenol or Ibuprofen as needed. Use your arm to help decrease pain and swelling. You can apply ice to any swelling for 10-15 minutes twice daily through clothing or a towel. See me in 3 months for recheck on your diabetes.

## 2023-10-17 ENCOUNTER — TELEPHONE (OUTPATIENT)
Dept: ORTHOPEDICS CLINIC | Facility: CLINIC | Age: 63
End: 2023-10-17

## 2023-10-17 RX ORDER — TAMSULOSIN HYDROCHLORIDE 0.4 MG/1
0.4 CAPSULE ORAL DAILY
Qty: 90 CAPSULE | Refills: 1 | OUTPATIENT
Start: 2023-10-17

## 2023-10-17 NOTE — TELEPHONE ENCOUNTER
Future Appointments   Date Time Provider Jeremías Vilchis   10/19/2023  1:20 PM Liz Marroquin MD [facility]       This patient is coming for Cervical spine. There was recent imaging done from last 06/2022. Please advise if additional views are needed for this appt. Thanks.       Patient may be reached at [phone]

## 2023-10-19 ENCOUNTER — APPOINTMENT (OUTPATIENT)
Dept: PHYSICAL THERAPY | Age: 63
End: 2023-10-19
Attending: ANESTHESIOLOGY
Payer: MEDICAID

## 2023-10-19 ENCOUNTER — OFFICE VISIT (OUTPATIENT)
Dept: ORTHOPEDICS CLINIC | Facility: CLINIC | Age: 63
End: 2023-10-19
Payer: MEDICAID

## 2023-10-19 VITALS — BODY MASS INDEX: 23.8 KG/M2 | WEIGHT: 170 LBS | HEIGHT: 71 IN

## 2023-10-19 DIAGNOSIS — M54.12 CERVICAL RADICULOPATHY: Primary | ICD-10-CM

## 2023-10-19 PROCEDURE — 99204 OFFICE O/P NEW MOD 45 MIN: CPT | Performed by: STUDENT IN AN ORGANIZED HEALTH CARE EDUCATION/TRAINING PROGRAM

## 2023-10-19 PROCEDURE — 3008F BODY MASS INDEX DOCD: CPT | Performed by: STUDENT IN AN ORGANIZED HEALTH CARE EDUCATION/TRAINING PROGRAM

## 2023-11-02 ENCOUNTER — OFFICE VISIT (OUTPATIENT)
Facility: CLINIC | Age: 63
End: 2023-11-02

## 2023-11-02 VITALS
HEART RATE: 62 BPM | DIASTOLIC BLOOD PRESSURE: 70 MMHG | HEIGHT: 71 IN | SYSTOLIC BLOOD PRESSURE: 114 MMHG | WEIGHT: 167.88 LBS | BODY MASS INDEX: 23.5 KG/M2

## 2023-11-02 DIAGNOSIS — Z79.899 MEDICATION MANAGEMENT: Primary | ICD-10-CM

## 2023-11-02 PROCEDURE — 3008F BODY MASS INDEX DOCD: CPT | Performed by: INTERNAL MEDICINE

## 2023-11-02 PROCEDURE — 3074F SYST BP LT 130 MM HG: CPT | Performed by: INTERNAL MEDICINE

## 2023-11-02 PROCEDURE — 3078F DIAST BP <80 MM HG: CPT | Performed by: INTERNAL MEDICINE

## 2023-11-02 PROCEDURE — 99243 OFF/OP CNSLTJ NEW/EST LOW 30: CPT | Performed by: INTERNAL MEDICINE

## 2023-11-02 NOTE — PATIENT INSTRUCTIONS
1. Schedule colonoscopy with [facility] [Diagnosis: screening]    2.  bowel prep from pharmacy (single dose golytely)    3. MEDICATION CHANGES PRIOR TO PROCEDURE       A. Day BEFORE colonoscopy: HOLD metformin, glipizide     B. Day OF colonoscopy: HOLD metformin, glipizide     C. Continue ALL other medications as usual  LANTUS instructions:  -two nights before procedure: take 70% of lantus dose (17 units)  -the night before procedure: take 50% of lantus (12 units)      4. Read all bowel prep instructions carefully. Bowel prep instructions can also be found online at:  [email or web address]     5. AVOID seeds, nuts, popcorn, raw fruits and vegetables for 3 days before procedure    6. You MAY need to go for COVID testing 72 hours before procedure. The testing team will call you a few days before your procedure to discuss with you if testing is required. If you are asked to go for COVID testing and do not completed the test, the procedure cannot be performed. 7. If you start any NEW medication after your visit today, please notify us. Certain medications (like iron or weight loss medications) will need to be held before the procedure, or the procedure cannot be performed safely.

## 2023-11-06 ENCOUNTER — TELEPHONE (OUTPATIENT)
Facility: CLINIC | Age: 63
End: 2023-11-06

## 2023-11-06 DIAGNOSIS — Z12.11 SCREEN FOR COLON CANCER: Primary | ICD-10-CM

## 2023-11-10 RX ORDER — POLYETHYLENE GLYCOL-3350 AND ELECTROLYTES 236; 6.74; 5.86; 2.97; 22.74 G/274.31G; G/274.31G; G/274.31G; G/274.31G; G/274.31G
POWDER, FOR SOLUTION ORAL
COMMUNITY
Start: 2023-11-03

## 2023-11-10 RX ORDER — DORZOLAMIDE HYDROCHLORIDE AND TIMOLOL MALEATE 20; 5 MG/ML; MG/ML
SOLUTION/ DROPS OPHTHALMIC
COMMUNITY
Start: 2023-11-05

## 2023-11-10 NOTE — TELEPHONE ENCOUNTER
Scheduled for: colonoscopy [id number]    Provider Name: Dr Vaishnavi Gilbert    Date: Wed 2/14/2024  Location: [facility]  Sedation: [facility]   Time: 2 pm   Prep: single golytely   Meds/Allergies Reconciled?: NKDA   Diagnosis with codes: colon screening Z12.11   Was patient informed to call insurance with codes (Y/N): Yes   Referral sent?:  Yes, medicaid  [facility] or [address] notified?:  I sent an electronic request to Endo Scheduling and received a confirmation today. Medication Orders:    A. Day BEFORE colonoscopy: HOLD metformin, glipizide  B. Day OF colonoscopy: HOLD metformin, glipizide  C. Continue ALL other medications as usual  LANTUS instructions:  -two nights before procedure: take 70% of lantus dose (17 units)  -the night before procedure: take 50% of lantus (12 units)  Pt is aware to NOT take iron pills, herbal meds and diet supplements for 7 days before exam. Also to NOT take any form of alcohol, recreational drugs and any forms of ED meds 24 hours before exam.      Misc Orders:       Further instructions given by staff:  Instructions given on phone  and pt verbalized understanding      [facility] instructions sent

## 2023-11-13 DIAGNOSIS — E11.65 TYPE 2 DIABETES MELLITUS WITH HYPERGLYCEMIA, WITH LONG-TERM CURRENT USE OF INSULIN (HCC): ICD-10-CM

## 2023-11-13 DIAGNOSIS — Z79.4 TYPE 2 DIABETES MELLITUS WITH HYPERGLYCEMIA, WITH LONG-TERM CURRENT USE OF INSULIN (HCC): ICD-10-CM

## 2023-11-13 RX ORDER — GABAPENTIN 100 MG/1
100 CAPSULE ORAL 3 TIMES DAILY
Qty: 90 CAPSULE | Refills: 0 | Status: SHIPPED | OUTPATIENT
Start: 2023-11-13

## 2023-11-13 NOTE — TELEPHONE ENCOUNTER
Medication: gabapentin 100 MG Oral Cap     Date of last refill: 9/20/23    Last office visit: 9/20/23  Due back to clinic per last office note:  n/a  Date next office visit scheduled:  none        Last OV note recommendation: The patient is a right-handed male who presents today for evaluation of neck pain with radicular symptoms on the right side. This is mostly in the C7 distribution. Has numbness in the hands and fingers. Does have significant tricep weakness. Based on symptoms, highly suspicious for cervical radiculopathy. Patient has failed conservative management to date including 8 weeks of physical therapy within the last 3 months, nonsteroidals, and muscle relaxers. Discussed need for cervical MRI for injection planning purposes. Also prescribed steroid Dosepak.

## 2023-11-14 RX ORDER — GLIPIZIDE 10 MG/1
10 TABLET ORAL
Qty: 180 TABLET | Refills: 0 | Status: SHIPPED | OUTPATIENT
Start: 2023-11-14

## 2023-11-16 ENCOUNTER — HOSPITAL ENCOUNTER (OUTPATIENT)
Dept: MRI IMAGING | Facility: HOSPITAL | Age: 63
Discharge: HOME | End: 2023-11-16
Attending: ANESTHESIOLOGY
Payer: MEDICAID

## 2023-11-16 DIAGNOSIS — M54.12 CERVICAL RADICULOPATHY: ICD-10-CM

## 2023-11-16 PROCEDURE — 72141 MRI NECK SPINE W/O DYE: CPT | Performed by: ANESTHESIOLOGY

## 2023-12-04 ENCOUNTER — OFFICE VISIT (OUTPATIENT)
Dept: ORTHOPEDICS CLINIC | Facility: CLINIC | Age: 63
End: 2023-12-04
Payer: MEDICAID

## 2023-12-04 DIAGNOSIS — M54.12 CERVICAL RADICULOPATHY: Primary | ICD-10-CM

## 2023-12-04 PROCEDURE — 99213 OFFICE O/P EST LOW 20 MIN: CPT | Performed by: STUDENT IN AN ORGANIZED HEALTH CARE EDUCATION/TRAINING PROGRAM

## 2023-12-13 DIAGNOSIS — E78.2 MIXED HYPERLIPIDEMIA: ICD-10-CM

## 2023-12-13 RX ORDER — GABAPENTIN 100 MG/1
100 CAPSULE ORAL 3 TIMES DAILY
Qty: 90 CAPSULE | Refills: 0 | Status: SHIPPED | OUTPATIENT
Start: 2023-12-13

## 2023-12-13 RX ORDER — ATORVASTATIN CALCIUM 20 MG/1
20 TABLET, FILM COATED ORAL NIGHTLY
Qty: 90 TABLET | Refills: 0 | Status: SHIPPED | OUTPATIENT
Start: 2023-12-13

## 2023-12-13 NOTE — TELEPHONE ENCOUNTER
Requested Prescriptions     Signed Prescriptions Disp Refills    ATORVASTATIN 20 MG Oral Tab 90 tablet 0     Sig: TAKE 1 TABLET(20 MG) BY MOUTH EVERY NIGHT     Authorizing Provider: Inocencio Ferro     Ordering User: Reyna Vergara     Refilled per protocol

## 2023-12-13 NOTE — TELEPHONE ENCOUNTER
Medication:   gabapentin 100 MG Oral Cap     Date of last refill: 11/13/23    Last office visit: 9/20/23  Due back to clinic per last office note:  NA  Date next office visit scheduled:  NA    Last OV note recommendation: Plan:      The patient is a right-handed male who presents today for evaluation of neck pain with radicular symptoms on the right side. This is mostly in the C7 distribution. Has numbness in the hands and fingers. Does have significant tricep weakness. Based on symptoms, highly suspicious for cervical radiculopathy. Patient has failed conservative management to date including 8 weeks of physical therapy within the last 3 months, nonsteroidals, and muscle relaxers. Discussed need for cervical MRI for injection planning purposes. Also prescribed steroid Dosepak.      Morgan Carpenter MD MPH  Pain Management

## 2024-01-09 ENCOUNTER — TELEPHONE (OUTPATIENT)
Dept: FAMILY MEDICINE CLINIC | Facility: CLINIC | Age: 64
End: 2024-01-09

## 2024-01-09 NOTE — TELEPHONE ENCOUNTER
Spoke to patient and he states he needs a letter 6/6/23 thru 1/9/24 out of work due to injury. And had done his medical treatment  he wants to start workers comp because he needs to pay his bills and has no money.  It is the letter that would need to be faxed to below    Ok for letter

## 2024-01-09 NOTE — TELEPHONE ENCOUNTER
Patient requesting a letter to return to work on 1/9/24.  The start date for the letter is 6/6/2023 and end date is 1/9/2024 that patient was not at work. Patient needs form to be completed by Dr. Wendi Arechiga and sent to [employer] at fax # [phone].    [address] [place] [address]  Phone: [phone]  Informed patient to allow up to 24 to 48 Business hours for a call back with a status.

## 2024-01-12 RX ORDER — GABAPENTIN 100 MG/1
100 CAPSULE ORAL 3 TIMES DAILY
Qty: 90 CAPSULE | Refills: 0 | Status: SHIPPED | OUTPATIENT
Start: 2024-01-12

## 2024-01-12 NOTE — TELEPHONE ENCOUNTER
Medication:   GABAPENTIN 100 MG Oral Cap     Date of last refill: 12/13/23    Last office visit: 9/20/23  Due back to clinic per last office note:  NA  Date next office visit scheduled:  NA    Last OV note recommendation: Plan:      The patient is a right-handed male who presents today for evaluation of neck pain with radicular symptoms on the right side.  This is mostly in the C7 distribution.  Has numbness in the hands and fingers.  Does have significant tricep weakness.  Based on symptoms, highly suspicious for cervical radiculopathy.  Patient has failed conservative management to date including 8 weeks of physical therapy within the last 3 months, nonsteroidals, and muscle relaxers.  Discussed need for cervical MRI for injection planning purposes.  Also prescribed steroid Dosepak.     Garcia Forman MD MPH  Pain Management

## 2024-01-16 ENCOUNTER — OFFICE VISIT (OUTPATIENT)
Dept: FAMILY MEDICINE CLINIC | Facility: CLINIC | Age: 64
End: 2024-01-16
Payer: MEDICAID

## 2024-01-16 VITALS
HEART RATE: 69 BPM | RESPIRATION RATE: 18 BRPM | TEMPERATURE: 98 F | HEIGHT: 71 IN | DIASTOLIC BLOOD PRESSURE: 76 MMHG | WEIGHT: 180 LBS | SYSTOLIC BLOOD PRESSURE: 114 MMHG | BODY MASS INDEX: 25.2 KG/M2

## 2024-01-16 DIAGNOSIS — Z79.4 TYPE 2 DIABETES MELLITUS WITH HYPERGLYCEMIA, WITH LONG-TERM CURRENT USE OF INSULIN (HCC): Primary | ICD-10-CM

## 2024-01-16 DIAGNOSIS — E11.42 DIABETIC POLYNEUROPATHY ASSOCIATED WITH TYPE 2 DIABETES MELLITUS (HCC): ICD-10-CM

## 2024-01-16 DIAGNOSIS — N40.1 BENIGN PROSTATIC HYPERPLASIA (BPH) WITH STRAINING ON URINATION: ICD-10-CM

## 2024-01-16 DIAGNOSIS — R39.16 BENIGN PROSTATIC HYPERPLASIA (BPH) WITH STRAINING ON URINATION: ICD-10-CM

## 2024-01-16 DIAGNOSIS — E11.65 TYPE 2 DIABETES MELLITUS WITH HYPERGLYCEMIA, WITH LONG-TERM CURRENT USE OF INSULIN (HCC): Primary | ICD-10-CM

## 2024-01-16 DIAGNOSIS — M54.12 CERVICAL RADICULOPATHY: ICD-10-CM

## 2024-01-16 LAB
CARTRIDGE LOT#: 641 NUMERIC
HEMOGLOBIN A1C: 9.5 % (ref 4.3–5.6)

## 2024-01-16 PROCEDURE — 83036 HEMOGLOBIN GLYCOSYLATED A1C: CPT | Performed by: FAMILY MEDICINE

## 2024-01-16 PROCEDURE — 3072F LOW RISK FOR RETINOPATHY: CPT | Performed by: FAMILY MEDICINE

## 2024-01-16 PROCEDURE — 99215 OFFICE O/P EST HI 40 MIN: CPT | Performed by: FAMILY MEDICINE

## 2024-01-16 PROCEDURE — 3008F BODY MASS INDEX DOCD: CPT | Performed by: FAMILY MEDICINE

## 2024-01-16 PROCEDURE — 3046F HEMOGLOBIN A1C LEVEL >9.0%: CPT | Performed by: FAMILY MEDICINE

## 2024-01-16 PROCEDURE — 3078F DIAST BP <80 MM HG: CPT | Performed by: FAMILY MEDICINE

## 2024-01-16 PROCEDURE — 3074F SYST BP LT 130 MM HG: CPT | Performed by: FAMILY MEDICINE

## 2024-01-16 RX ORDER — TAMSULOSIN HYDROCHLORIDE 0.4 MG/1
0.4 CAPSULE ORAL DAILY
Qty: 90 CAPSULE | Refills: 3 | Status: SHIPPED | OUTPATIENT
Start: 2024-01-16

## 2024-01-16 RX ORDER — INSULIN GLARGINE 100 [IU]/ML
30 INJECTION, SOLUTION SUBCUTANEOUS NIGHTLY
COMMUNITY
Start: 2024-01-16

## 2024-01-16 NOTE — PATIENT INSTRUCTIONS
I am increasing your Lantus to 30 units nightly.    Continue all your other medication as prescribed.    Make an appointment with the diabetic clinic.    Make an appointment with physical therapy.    See me in April for recheck on your diabetes.    Video [employer]  What is Type 2 Diabetes?  Watch this clip to understand what happens within your body when you have type 2 diabetes, and the importance of keeping your blood glucose levels within a healthy range.  To watch the video:  Scan the QR code  Using your mobile device, scan the following code:  OR  Go to the website:  [employer]  Enter the prescription code:  1576E    © 2840-8039 [employer]. All rights reserved. This information is not intended as a substitute for professional medical care. Always follow your healthcare professional's instructions.    Managing Type 2 Diabetes  Type 2 diabetes is a long-term (chronic) condition. Managing diabetes may mean making some tough changes. Yourhealthcare team can help you.  To manage type 2 diabetes, you'll need to balance your medicine with diet and activity. You will also need check your blood sugar. And work with yourhealthcare provider to prevent complications.    Take your medicine  You may take pills or give yourself insulin shots for diabetes. Or you may use both. Take your medicines or give yourself insulin at the right times to help you control your blood sugar. Think about ways that will help you remember to take your medicines the right way every day. Ask your healthcare provider orteam for ideas.  You may only take pills for your diabetes. But this may change. Over time, mostpeople with type 2 diabetes also need insulin.  Eat healthy  A healthy diet helps control the amount of sugar in your blood. It also helps you stay at a healthy weight. Or it helps you lose weight, if if you'reoverweight. Extra weight makes it harder to control diabetes.  Your healthcare team will help  you create a plan that works for you. You don'thave to give up all the foods you like. Have meals and snacks with:  Vegetables  Fruits  Lean meats or other healthy proteins  Whole grains  Low- or nonfat dairy products     Be physically active  Being active helps lower your blood sugar. Activity helps your body use insulinto turn food into energy. It also helps you manage your weight:  Ask your healthcare provider to help you to make an activity program that's right for you. Your program is based on your age, general health, and types of activity you enjoy. Start slow. But aim for at least 150 minutes of exercise or activity each week. Start with 30 minutes a day. Exercise in 10-minute blocks. Don’t let more than 2 days go by without being active.  Check your blood sugar  Checking your own blood sugar may be a regular part of your care. Or you may only need to check your blood sugar from time to time. Your healthcare provider will tell you how to check your blood sugar at home. Checking it tells you if your blood sugar is in your target range. Having your blood sugars within thetarget range means that you are managing your diabetes well.  If your blood sugar levels are too high or too low, your healthcare provider may suggest changes to your diet or activity level. He or she may also adjustyour medicine.  Your healthcare provider may also tell you to check your blood sugar more oftenwhen you are sick.  Take care of yourself  When you have diabetes, you may be more likely to get other health problems. They include foot, eye, heart, nerve, and kidney problems. You can help prevent these problems by controlling your blood sugar and taking good care of yourself. Your healthcare provider, nurse, diabetes educator, and others canhelp you with the following:  Checkups. You should have regular checkups with your healthcare provider. At those visits, you will have a physical exam that includes checking your feet. Your  healthcare provider will also check your blood pressure and weight. Take your shoes off before your appointment starts to be sure your feet are checked.  Other exams. You'll also need eye, foot, and dental exams at least once each year.  Lab tests. You will have blood and urine tests:  Your healthcare provider will check your hemoglobin A1C at least twice a year. This blood test shows how well you have been controlling your blood sugar over 2 to 3 months. The results help your healthcare provider manage your diabetes.    You will also have other lab tests. For example, to check for kidney problems and abnormal cholesterol levels.  Smoking. If you smoke, you will need to quit. Smoking makes it more likely to get complications from diabetes. Ask your healthcare provider about ways to quit. Also don't use e-cigarette, or vaping, products.  Vaccines. Get a yearly flu shot. And ask your healthcare provider about vaccines to prevent pneumonia, shingles, and hepatitis B.  Stress and depression  Most people have challenges throughout their lives. Living with diabetes can increase your stress. Feeling stressed or depressed can actually affect yourblood sugar levels.  Tell your healthcare provider if you are having trouble coping with diabetes.He or she can help or refer you to other providers or programs.  To learn more  Know where you can get help. You can try the following:  Support. Ask family and friends to support your efforts to take care of yourself. Or look for a diabetes support group nearby or on the internet. Check the Connect with Others link on www.diabetes.org.  Counseling. Talk with a , psychologist, psychiatrist, or other counselor.  Information. Contact the American Diabetes Association at 707-506-6417 or www.diabetes.org  [employer] last reviewed this educational content on11/1/2019    © 5736-2181 [employer]. All rights reserved. This information is not intended as a substitute for  professional medical care. Always follow yourhealthcare professional's instructions.    Type 2 Diabetes and Food Choices  You make food choices every day. Whole wheat or white bread? A side of French fries or fresh fruit? Eat now or later? Choices about what, when, and how much you eat affect your blood sugar (glucose), and also your blood pressure and cholesterol. Understanding how food affects blood glucose is the first step in managing diabetes. And following a diabetesmeal plan can help you keep your blood glucose levels on track.   Prevent problems  Having type 2 diabetes means that your body doesn’t control blood glucose well. When blood glucose stays too high for too long, serious health problems can develop. It's important to control your blood glucose through diet, exercise, and medicine. This can delay or prevent kidney,eye, nerve, and heart disease, and other complications of diabetes.   Control carbohydrates  Carbohydrates are foods that have the biggest effect on your blood glucose levels. After you eat carbohydrates, your blood glucose rises. Fruit, sweet foods and drinks, starchy foods (such as bread, potatoes, and rice), and milk and milk products contain carbohydrates. Carbohydrates are important for health. But when you eat too many at once, your blood glucose can go too high. This is even more likely if you don't have or take enough insulin forthat food.   Some carbohydrates may raise blood glucose more than others. These include potatoes, sweets, and white bread. Better choices are less processed foods with more fiber and nutrients. Good options are 100% whole-wheat bread, oatmeal,brown rice, and nonstarchy vegetables.   Learn to use food labels that show added sugar. And try to find healthierchoices, particularly if you are overweight.    Food and medicine  Insulin helps glucose move from the blood into your muscle cells, where it can be used for energy. Some diabetes medicines that are taken by  mouth help you make more insulin. Or they help your insulin work more efficiently. So your medicines and food plan have to work together. If you take insulin shots, you need to be very careful to match the amount of carbohydrates you eat with your insulin dose. If you have too many carbohydrates without adjusting your insulin dose, your blood glucose might become too high. If you have too few carbohydrates, your blood glucose might be too low. Your healthcare provider ora dietitian can help you match your food choices to your medicine.   Have a meal plan  With certain medicines, it's best to eat the same amount of food at the same time every day. That keeps your glucose levels stable. And it helps your medicine work best. Physical activity is an important way to control blood glucose, too. Try to exercise at the same time every day. That way you can build the extra calories you need for exercise into your meal plan. With othermedicines, you may have more choices about how much you eat and when.   If you want to change your medicine to better fit your lifestyle, talk withyour healthcare provider.   Eat smart  You can eat the same foods as everyone else, but you have to carefully watch for certain details. That’s where your diabetes meal plan comes in. A personal meal plan tells you the time of day to eat meals and snacks, the types of food to eat, and how much. Itshould include your favorite foods. And it should focus on these healthy foods:   Whole grains, such as 100% whole-wheat bread, brown rice, and oatmeal  Nonfat or low-fat dairy products, such as nonfat milk and yogurt (but be sure these products don't have sugar added to make up for the fat removed)  Lean meats, poultry, fish, eggs, and dried beans and peas  Foods and drinks with no added sugar  Fruits and vegetables  At first, it may be helpful to use measuring cups and spoons to make sure you’re really eating the amount of food that’s in your plan. You can  also use standardized portion sizes on food labels, such as 1 serving of meat being the size of a deck of cards. By checking your blood glucose 1 to 2 hours after eating, you can learn how your food choicesaffect your blood glucose.   To create a diabetes meal plan or change a plan that’s not working for you, see a dietitian or diabetes educator. Let them know if you have any new health concerns or if your medicines have changed. Having ameal plan that you can live with will keep you at your healthy best.     © 7646-7591 [employer]. All rights reserved. This information is not intended as a substitute for professional medical care. Always follow yourhealthcare professional's instructions.    Diabetes: Caring for Your Body   When you have diabetes, your body needs special care. This care helps you stay healthy and prevent problems. Exercise and healthy eating are a part of this. You can also protect yourself by taking special care of your feet, skin, teeth,and eyes.   Caring for your feet     Follow these tips to help keep your feet healthy:  Check your feet every day for redness, blisters, cracks, dry skin, or numbness. Use a mirror to check the bottoms of your feet, if needed. Or ask for help.  Wash your feet in warm (not hot) water. Don’t soak them.  Use an emery board to keep your toenails even with the ends of your toes. File away sharp edges. A foot doctor (podiatrist) may need to cut your toenails for you.  Smooth down calluses gently or wait until your next podiatry appointment.  Keep your skin soft and smooth by putting a thin layer of skin lotion on the tops and bottoms of your feet. Don't put lotion in between your toes.  Always wear shoes or slippers, even inside your home. Make sure that shoes are correctly fitted, not too tight and not too loose. This can cause friction and rubbing of your feet. Change your socks daily. Always check shoes for foreign objects before putting them on.  Call  your healthcare provider right away if your feet are numb or painful. Also call your provider if a cut or sore doesn’t heal in a few days.  Preventing skin infections   To prevent skin infections, bathe every day, but use a moderate water temperature.. Dry yourself well, especially between your toes. Try to keep your home on the humid side during the colder months of the year to prevent your skin getting dry. Wash any cuts with warm, soapy water. Cover with a sterile bandage. Call your healthcare provider if a cut or sore doesn't heal in a fewdays, feels warm, itches, is swollen, or has a bad smell.   Caring for your teeth   Follow these guidelines for healthy teeth:  Brush your teeth twice daily.  Floss your teeth daily.  See your dentist at least twice yearly.  Keep your blood sugar in a good range.  Caring for your eyes  Have your eyes checked every year by an optometrist or ophthalmologist. Letyour healthcare provider know if you experience any of the following symptoms:   Blurred vision  Dark spots or \"holes\"  Flashes of light  Seeing more floaters than usual  Poor night vision  If you smoke, quit  Smoking is dangerous for everyone, especially people with diabetes. It can harm the blood vessels in your eyes, kidneys, nerves, and heart. It raises blood pressure. Smoking can also slow healing, so infections are more likely. Askyour healthcare provider about programs to help you stop smoking.   Nancy last reviewed this educational content on12/1/2021 © 2000-2022 [employer]. All rights reserved. This information is not intended as a substitute for professional medical care. Always follow yourhealthcare professional's instructions.

## 2024-01-16 NOTE — PROGRESS NOTES
The 21st Century Cures Act makes medical notes like these available to patients in the interest of transparency. Please be advised this is a medical document. Medical documents are intended to carry relevant information, facts as evident, and the clinical opinion of the practitioner. The medical note is intended as peer to peer communication and may appear blunt or direct. It is written in medical language and may contain abbreviations or verbiage that are unfamiliar.       Chivo Miller is a 63 year old male.    HPI:     Chief Complaint   Patient presents with    Follow - Up    Diabetes    Back Pain     X3 weeks       This 63-year-old male presents to the office for follow-up on his diabetes and his neck pain and cervical radiculopathy.    The patient is currently taking metformin 1000 mg twice daily, glipizide 10 mg twice daily and Lantus 25 units nightly.  His blood sugars have been ranging between 68 and 275.  He is denying any hypoglycemic symptoms.  He has noted that when he eats, he perspires.  He has checked his blood sugar prior to these episodes and his blood sugars were not low at those times.  He does not have any other symptoms associated with the meals.  He also states it does not matter what he eats to cause perspiration.  He complains of intermittent tingling, burning and pain into his feet, the left worse than the right.  He had his diabetic eye exam on November 3, 2023.  The patient had bilateral cataract surgery done in December.    He needs a refill on his tamsulosin for his BPH symptoms.    The patient had been seen by Dr. Gandhi of orthopedics on 12/4/2023 in follow-up of his cervical radiculopathy.  He has also seen Dr. Forman of the pain clinic in September 2023.  The patient is right-handed and reported picking up a heavy suitcase in June with onset of numbness and tingling down the posterior aspect of his right arm.  The patient states when he has the pain, it can be very sharp and  stabbing.  He will sometimes get numbness extending to the fingers along the ulnar aspect.  Patient had MRI of the C-spine done on 11/16/2023 which showed modest degenerative changes of the spine without signs of central canal stenosis, foraminal stenosis, cord compression.  Chronic appearing straightening of the cervical spine.  No subluxation, fracture, lesions.  Spinal cord had normal signal.  The most significant degenerative changes are noted at the level of C5-C6 which showed disc degeneration with ventral ridging of the thecal sac from disc osteophyte complex along the posterior disc margin.  Slightly greater degenerative change at this level, including visible narrowing of the joint space in the cephalocaudal dimension.  Effacement of the ventral thecal sac and slight contact with the ventral cord left of midline but no sign of cord compression or significant central canal stenosis.  No significant foraminal stenosis.  The patient had been treated with a Medrol Dosepak.  He had failed anti-inflammatories and muscle relaxants.  He is currently on gabapentin 100 mg 3 times daily for the radiculopathy.  He states the pain is worse with prolonged standing in the posterior aspect of his neck and radiates down the posterior aspect of the right arm.  It is also worse with any heavy lifting.  He has been off work since the injury.  He worked as a .  The patient states he has only had 2 sessions of physical therapy.  The patient is stating his  has told him he needs a letter from me attesting to his current state of health.  The patient is currently applying for Worker's Comp. injury.    HISTORY:  Past Medical History:   Diagnosis Date    Allergic rhinitis, cause unspecified 06/29/2012    ED (erectile dysfunction) 07/15/2013    Pure hypercholesterolemia 07/29/2013    Type 2 diabetes mellitus with hyperglycemia, with long-term current use of insulin (HCC) 06/29/2012    Unspecified hearing loss  06/29/2012    Vitamin D deficiency 06/29/2012      History reviewed. No pertinent surgical history.   Family History   Problem Relation Age of Onset    Stroke Mother     Other (liver cancer) Father       Social History:   Social History     Socioeconomic History    Marital status:    Tobacco Use    Smoking status: Never    Smokeless tobacco: Never   Vaping Use    Vaping Use: Never used   Substance and Sexual Activity    Alcohol use: Yes     Alcohol/week: 0.0 standard drinks of alcohol     Comment: occa    Drug use: Yes     Types: Cannabis     Comment: occ    Sexual activity: Yes   Other Topics Concern     Service No    Blood Transfusions No    Caffeine Concern Yes     Comment: 1 cup daily    Occupational Exposure No    Hobby Hazards No    Sleep Concern No    Stress Concern No    Weight Concern No    Special Diet No    Back Care No    Exercise Yes     Comment: PT excersises at home    Bike Helmet No    Seat Belt No    Self-Exams No        Medications (Active prior to today's visit):  Current Outpatient Medications   Medication Sig Dispense Refill    tamsulosin 0.4 MG Oral Cap Take 1 capsule (0.4 mg total) by mouth daily. 90 capsule 3    insulin glargine (LANTUS SOLOSTAR) 100 UNIT/ML Subcutaneous Solution Pen-injector Inject 30 Units into the skin nightly.      GABAPENTIN 100 MG Oral Cap TAKE 1 CAPSULE(100 MG) BY MOUTH THREE TIMES DAILY 90 capsule 0    ATORVASTATIN 20 MG Oral Tab TAKE 1 TABLET(20 MG) BY MOUTH EVERY NIGHT 90 tablet 0    glipiZIDE 10 MG Oral Tab Take 1 tablet (10 mg total) by mouth 2 (two) times daily before meals. 180 tablet 0    dorzolamide-timolol 2-0.5 % Ophthalmic Solution       cyclobenzaprine 10 MG Oral Tab Take 1 tablet (10 mg total) by mouth nightly as needed.      ibuprofen 600 MG Oral Tab Take 1 tablet (600 mg total) by mouth every 6 (six) hours as needed for Pain.      Insulin Pen Needle (PEN NEEDLES) 32G X 6 MM Does not apply Misc 1 each at bedtime. Use 1 pen needle with the  Lantus every night 100 each 1    Alcohol Swabs 70 % Does not apply Pads Take 1 Bottle by mouth As Directed.      brimonidine 0.2 % Ophthalmic Solution Place 1 drop into the right eye in the morning, at noon, and at bedtime.      MetFORMIN HCl 1000 MG Oral Tab Take 1 tablet (1,000 mg total) by mouth 2 (two) times daily with meals. 60 tablet 3    Glucose Blood (TRUE METRIX BLOOD GLUCOSE TEST) In Vitro Strip Test twice daily. 100 strip 5    Lancets Does not apply Misc Test twice daily 100 each 0    Glucose Blood (TRUETRACK TEST) In Vitro Strip TEST TWICE DAILY 200 each 1    Lancets Does not apply Misc TEST TWICE DAILY 200 each 1    GAVILYTE-G 236 g Oral Recon Soln TAKE 4000ML BY MOUTH ONCE FOR 1 DOSE AS DIRECTED BY GI CLINIC INSTRUCTIONS (Patient not taking: Reported on 1/16/2024)         Allergies:  No Known Allergies    ROS:     Pertinent positives and pertinent negatives are as listed in HPI.    All other review of symptoms were reviewed and negative.    PHYSICAL EXAM:   /76 (BP Location: Left arm, Patient Position: Sitting, Cuff Size: adult)   Pulse 69   Temp 98 °F (36.7 °C)   Resp 18   Ht 5' 11\" (1.803 m)   Wt 180 lb (81.6 kg)   BMI 25.10 kg/m²     Wt Readings from Last 3 Encounters:   01/16/24 180 lb (81.6 kg)   11/02/23 167 lb 14.4 oz (76.2 kg)   10/19/23 170 lb (77.1 kg)       BP Readings from Last 3 Encounters:   01/16/24 114/76   11/02/23 114/70   10/16/23 100/58       General: Well-nourished, well hydrated.  Patient complains of neck pain radiating into the right arm which is worse when he is standing.  No pallor.   HEENT: Normocephalic, atraumatic.  NORBERTO, EOMI, Sclera clear and non icteric bilaterally. TM's normal, nose without congestion, pharynx without redness or exudates. Moist mucous membranes.  Neck: Supple. No lymphadenopathy. No thyromegaly. No bruits noted.  Patient is complaining of pain with range of motion in all planes.  Heart: RRR without S3 or S4 or murmur.  Lungs: Clear to  auscultation bilaterally. No rales, rhonchi or wheezes. No tachypnea or retractions noted.  Abdomen: Soft, nontender, nondistended, normal bowel sounds. No organomegaly. No guarding, rigidity or rebound noted.  Extremities: No edema bilaterally.  Bilateral barefoot skin diabetic exam is abnormal with diabetic monofilament/sensation testing abnormal  Very dry skin is noted.  No signs of skin breakdown or skin infection.  Normal capillary refill.  Skin: Warm and dry.  Neuro: Alert and oriented x 3, normal gait.  Psych: Normal mood and affect.     ASSESSMENT/PLAN:   63 year old male with    LABS This Visit:    Results for orders placed or performed in visit on 01/16/24   HEMOGLOBIN A1C    Collection Time: 01/16/24  1:28 PM   Result Value Ref Range    HEMOGLOBIN A1C 9.5 (A) 4.3 - 5.6 %    Cartridge Lot# [id number] Numeric    Cartridge Expiration Date 09/01/25 Date        1. Type 2 diabetes mellitus with hyperglycemia, with long-term current use of insulin (HCC)  2. Diabetic polyneuropathy associated with type 2 diabetes mellitus (HCC)    I discussed the results of the hemoglobin A1c with the patient.  I advised him that it is still too high.  I again reminded him the goal is to get his hemoglobin A1c under 7.  Diabetic foot care is reviewed.  I am going to increase his Lantus to 30 units nightly.  He should continue with his metformin at 1000 mg twice daily and his glipizide 10 mg twice daily.  He has had his diabetic eye exam in November 2023.  He is referred to the diabetic clinic for management of his diabetes.    - HEMOGLOBIN A1C  - [facility] Provider  - insulin glargine (LANTUS SOLOSTAR) 100 UNIT/ML Subcutaneous Solution Pen-injector; Inject 30 Units into the skin nightly.      3. Benign prostatic hyperplasia (BPH) with straining on urination    I refilled the patient's tamsulosin..    - tamsulosin 0.4 MG Oral Cap; Take 1 capsule (0.4 mg total) by mouth daily.  Dispense: 90 capsule; Refill: 3    4.  Cervical radiculopathy    I reviewed the last note from Dr. Gandhi with the patient.  The patient needs to complete his physical therapy.  A new order has been placed. He should continue the gabapentin 100 mg tid for his pain. He takes Ibuprofen as needed for pain. I informed the patient that I would need something in writing from his  as to what exactly he wants documented and the patient would need to sign a release of records.    - Physical Therapy Referral - [facility]         Health Maintenance:    Health Maintenance   Topic Date Due    Colorectal Cancer Screening  Never done    Diabetes Care Dilated Eye Exam  11/3/2024    Zoster Vaccines (2 of 2) 04/08/2023    Influenza Vaccine (1) 10/01/2023    Annual Depression Screening  Completed    Diabetes Care Foot Exam (Annual)  Completed    Diabetes Care A1C  04/16/2024    Annual Physical  06/12/2024    Diabetes Care: GFR  06/12/2024    Diabetes Care: Microalb/Creat Ratio  06/12/2024    PSA  06/12/2025    DTaP,Tdap,and Td Vaccines (2 - Td or Tdap) 02/11/2033    Pneumococcal Vaccine: Birth to 64yrs  Completed         Meds This Visit:  Requested Prescriptions     Signed Prescriptions Disp Refills    tamsulosin 0.4 MG Oral Cap 90 capsule 3     Sig: Take 1 capsule (0.4 mg total) by mouth daily.       Imaging & Referrals:  OP REFERRAL PROVIDER VISIT-DIABETES  OP REFERRAL TO [facility]     Patient understands plan and follow-up.  FU in 3 months for recheck on diabetes.    1/16/2024  Wendi Arechiga DO    Total time: 40 minutes including precharting, H&P, plan of care    This dictation was performed with a verbal recognition program (DRAGON) and it was checked for errors. It is possible that there are still dictated errors within this office note. If so, please bring any errors to my attention for an addendum. All efforts were made to ensure that this office note is accurate

## 2024-01-17 ENCOUNTER — MED REC SCAN ONLY (OUTPATIENT)
Dept: FAMILY MEDICINE CLINIC | Facility: CLINIC | Age: 64
End: 2024-01-17

## 2024-01-17 ENCOUNTER — TELEPHONE (OUTPATIENT)
Dept: FAMILY MEDICINE CLINIC | Facility: CLINIC | Age: 64
End: 2024-01-17

## 2024-01-17 NOTE — TELEPHONE ENCOUNTER
Received fax on 1/17/2024 requesting medical records. Requesting medical records for 06/06/2023 to present.   All billing records including all statements, insurance claim forms itemized bills, and records if billing to third part payers or denial of benefits. All pharmacy prescriptions records including NDC numbers and drug information handouts/monographs. All laboratory history cytology,pathology Xray, MRI , CT SCAN, MRA, EMG. All physical occupational rehab requests consultations and progress notes. All disability medicaid or Medicare records including claim forms and records of denial of benefits. All and any medical records.     Name: [employer]    Address: [address] [place] [address]  PH: [phone]  Fax: [phone]    Original sent to Scan [facility], copy sent to medical records. Red Tag # [id number]

## 2024-01-19 NOTE — PROGRESS NOTES
SPINE EVALUATION:     Diagnosis:   Cervical radiculopathy (M54.12)   Referring Provider: Wendi Arechiga DO Date of Evaluation:    1/22/2024    Precautions:  None Next MD visit:   none scheduled  Date of Surgery: n/a  Symptom onset: 6-6-23 after lifting a suitcase at work     PATIENT SUMMARY   Chivo Miller is a 63 year old male who presents to therapy today with complaints of R neck and RUE pain since 'sprain' 6-6-23 when he lifted a suitcase at work. 'It felt like the arm fell out of the socket.' Pain was excruciating. Pain in R UT and R chest/clavicle and N/T that radiates down RUE to the fingers. When first onset, digits 2-3 were numb, now digits 3-5 are numb, comes and goes. He states he feels an 'electrical shock' in RUE whenever he moves the RUE, especially in the morning. Pt had 8 visits of PT last year ending 8-28-23, which pt states did not help. But slowly over time the pain has been decreasing in severity; he has been doing exercises at the gym x3/wk, but the pain persists at mild-moderate level. Pt is back sleeper, pain does not awaken him overnight anymore. Taking 500mg ibuprofen for pain control PRN, currently taking once about every other day. He gets relief c raising RUE overhead. Worse c cervical flexion. Pt is not currently working due to this injury; MD recommends he has more therapy first. Pt would normally work 16 hrs/day, 6 days a week as a . Pt is R hand dominant. The patient had been seen by Dr. Gandhi of orthopedics on 12/4/2023 in follow-up of his cervical radiculopathy.  He has also seen Dr. Forman of the pain clinic in September 2023.     Pt describes pain level at best 3/10, at worst 4/10.   Current functional limitations include UA to lift heavy items required for work, pain c looking down, driving.     Chivo describes prior level of function full and painfree. Pt goals include resolve pain/N/T, be able to move neck and lift s pain so can return to work.  Past medical  history was reviewed with Chivo. Significant findings include DM, diabetic polyneuropathy, benign prostatic hyperplasia, mixed hyperlipidemia.  Pt denies diplopia, dysarthria, dysphasia, dizziness, drop attacks, bowel/bladder changes, saddle anesthesia, and ARCELIA LE N/T.    ASSESSMENT  Chivo presents to physical therapy evaluation with primary c/o neck and RUE pain/N/T. The results of the objective tests and measures show painful cervical ROM, painful RUE reach behind back, decr 1st rib mobility on R and segmental cervical mobility, impaired posture, decr postural strength.  Functional deficits include but are not limited to UA to lift heavy, pain c cervical motion, driving.  Signs and symptoms are consistent with rehab diagnosis. Pt and PT discussed evaluation findings, pathology, POC and HEP.  Pt voiced understanding and performs HEP correctly without reported pain. Skilled Physical Therapy is medically necessary to address the above impairments and reach functional goals.     OBJECTIVE:   Observation/Posture: B rounded shoulders  Neuro Screen: intact to light touch RUE, but diminished RUE vs LUE    Cervical AROM: (* denotes performed with pain)  Flexion: 50*  Extension: 60*  Sidebending: R 19; L 21* L UT  Rotation: R 63; L 60    Shoulder AROM: (* denotes performed with pain)  R shld flex/abd/ER behind head full and painfree. Mild discomfort R scapula c IR RUE behind back.    Accessory motion: hypomobile R 1st rib. Pain c upslopes on L into R cervical rotation  Palpation: TTP spinous processes C6-T1    Strength: (* denotes performed with pain)  @eval:   Shoulder Flex: R 4+/5, L 5/5  Shoulder ABD (C5): R 4+/5, L 5/5  Biceps (C6): R 5/5, L 5/5  Triceps (C7): R 5/5, L 5/5  Mid trap: R 4*/5; L 4/5  Low trap: R 3+*/5; L 3+*/5     Flexibility:   Pec Major: decr B    Gait: pt ambulates on level ground with normal mechanics.    Imagin23 CERVICAL MRI   FINDINGS:  Developmental shortening of the cervical  pedicles is present, which reduces the baseline caliber of the central spinal canal on a developmental basis.  There is straightening of the cervical spine, with loss of usually observed lordosis. However there is no kyphosis. This appearance can be due to any of the following, including in combination: Neck pain, muscle spasm, cervical strain, cervical degenerative disease, and injury.  No subluxation is seen.   Degenerative changes the spine are present as described below.   DECRIPTION OF INDIVIDUAL DISC LEVELS   The craniocervical junction appears without disruption, malalignment or other active abnormality. The C1-2 articulation appears without disruption, or other active abnormality.   C2-C3:  Normal   C3-C4:  Disc degeneration with mild ventral ridging of the thecal sac from disc osteophyte complex along the posterior disc margin. No sign of cord contact, central canal stenosis, foraminal stenosis or other compromise.   C4-C5:  Disc degeneration with ventral ridging of the thecal sac from disc osteophyte complex along the posterior disc margin.  Mild effacement of the thecal sac, no central canal or foraminal stenosis.   C5-C6:  Disc degeneration with ventral ridging of the thecal sac from disc osteophyte complex along the posterior disc margin.  Slightly greater degenerative change at this level, including visible narrowing of the joint space in the cephalocaudal dimension.  Effacement of the ventral thecal sac and slight contact with the ventral cord left of midline for example axial image 33 series 5 but no sign of cord compression or significant central canal stenosis.  No significant foraminal stenosis.   C6-C7:  Normal   C7-T1  Normal    CONCLUSION:  Relatively modest degenerative changes in the spine, without signs of central canal stenosis, foraminal stenosis, cord compression.  Chronic appearing straightening of the cervical spine.  No subluxation, fracture, lesion.  Spinal cord normal signal.      Today’s Treatment and Response:   Pt education was provided on exam findings, treatment diagnosis, treatment plan, expectations, and prognosis. Pt was also provided recommendations for activity modifications, modalities as needed [ice/heat], postural corrections, ergonomics, and importance of remaining active  Patient was instructed in and issued a HEP for:   Review of pt's current exercise: pushups, shld abd c 5-10#wts, machines for: inner/outer thigh//HS curl/rows  Pt advised to d/ c shld abd with that amount of wt to avoid further compression/irritation of any nerves in brachial plexus/neck. Pt can do bicep curl if non-provoking, as well as PT HEP as noted today.  Access Code: [email or web address] ; URL: [email or web address]  Date: 01/22/2024 ; Prepared by: Zeina Flores  Exercises  - Sidelying Thoracic Rotation with Open Book  - 2 x daily - 10 reps - 5 hold  - Doorway Pec Stretch at 60 Elevation  - 2 x daily - 3 reps - 10 hold  - Standing Row with Anchored Resistance GTB  - 2 x daily - 20 reps - 5 hold    Charges: PT Eval Low Complexity, therex-1      Total Timed Treatment: 15 min     Total Treatment Time: 45 min     Based on clinical rationale and outcome measures, this evaluation involved Low Complexity decision making.  PLAN OF CARE:    Goals: (to be met in 8 visits)   Consistently decr pain < or = 1/10 intermittent for incr QOL and activity tolerance  Overall incr in function as indicated by decr NDI at least 10 pts  Painfree AROm c-spine and R 1st rib to allow improve neuromobility and decr symptoms  Improved postural awareness c incr axial elongation and scap retraction noted in therapy  Incr RUE MMT at least 1/2 grade painfree for incr mm support for functional activities and posture  Pt able to lift c proper ergonomics at least 20# to improve work tasks painfree  Indep HEP for progress toward functional goals    Frequency / Duration: Patient will be seen for 2 x/week or a total of 8 visits over a 90 day  period. Treatment will include: Manual Therapy, Neuromuscular Re-education, Therapeutic Exercise, and Home Exercise Program instruction    Education or treatment limitation: None  Rehab Potential:good    Neck Disability Index Score  Score: 22 % (7/12/2023  3:49 PM)      Patient/Family/Caregiver was advised of these findings, precautions, and treatment options and has agreed to actively participate in planning and for this course of care.    Thank you for your referral. Please co-sign or sign and return this letter via fax as soon as possible to [phone]. If you have any questions, please contact me at Dept: [phone]    Sincerely,  Electronically signed by therapist: Zeina Flores PT    Physician's certification required: Yes  I certify the need for these services furnished under this plan of treatment and while under my care.    X___________________________________________________ Date____________________    Certification From: 1/22/2024  To:4/21/2024

## 2024-01-22 ENCOUNTER — OFFICE VISIT (OUTPATIENT)
Dept: PHYSICAL THERAPY | Age: 64
End: 2024-01-22
Attending: FAMILY MEDICINE
Payer: MEDICAID

## 2024-01-22 DIAGNOSIS — M54.12 CERVICAL RADICULOPATHY: Primary | ICD-10-CM

## 2024-01-22 PROCEDURE — 97161 PT EVAL LOW COMPLEX 20 MIN: CPT

## 2024-01-22 PROCEDURE — 97110 THERAPEUTIC EXERCISES: CPT

## 2024-01-25 ENCOUNTER — OFFICE VISIT (OUTPATIENT)
Dept: PHYSICAL THERAPY | Age: 64
End: 2024-01-25
Attending: FAMILY MEDICINE
Payer: MEDICAID

## 2024-01-25 DIAGNOSIS — M54.12 CERVICAL RADICULOPATHY: Primary | ICD-10-CM

## 2024-01-25 PROCEDURE — 97140 MANUAL THERAPY 1/> REGIONS: CPT

## 2024-01-25 PROCEDURE — 97110 THERAPEUTIC EXERCISES: CPT

## 2024-01-25 NOTE — PROGRESS NOTES
PT DAILY NOTE  Diagnosis:   Cervical radiculopathy (M54.12)   Referring Provider: Wendi Arechiga DO Date of Evaluation:    1/22/2024    Precautions:  None Next MD visit:   none scheduled  Date of Surgery: n/a  Symptom onset: 6-6-23 after lifting a suitcase at work     Insurance Primary/Secondary: [place]/[facility] [employer]AN     Visit 2 of 8   Date POC Expires: 3-22-24       Subjective: Pt states he feels some relief since doing the exercises. \"I do not have much pain today\". Rates pain 6/10.  Pain: 6/10   @eval: Chivo Miller is a 63 year old male who presents to therapy today with complaints of R neck and RUE pain since 'sprain' 6-6-23 when he lifted a suitcase at work. 'It felt like the arm fell out of the socket.' Pain was excruciating. Pain in R UT and R chest/clavicle and N/T that radiates down RUE to the fingers. When first onset, digits 2-3 were numb, now digits 3-5 are numb, comes and goes. He states he feels an 'electrical shock' in RUE whenever he moves the RUE, especially in the morning. Pt had 8 visits of PT last year ending 8-28-23, which pt states did not help. But slowly over time the pain has been decreasing in severity; he has been doing exercises at the gym x3/wk, but the pain persists at mild-moderate level. Pt is back sleeper, pain does not awaken him overnight anymore. Taking 500mg ibuprofen for pain control PRN, currently taking once about every other day. He gets relief c raising RUE overhead. Worse c cervical flexion. Pt is not currently working due to this injury; MD recommends he has more therapy first. Pt would normally work 16 hrs/day, 6 days a week as a . Pt is R hand dominant. The patient had been seen by Dr. Gandhi of orthopedics on 12/4/2023 in follow-up of his cervical radiculopathy.  He has also seen Dr. Forman of the pain clinic in September 2023.   Pt describes pain level at best 3/10, at worst 4/10.   Current functional limitations include UA to lift heavy  items required for work, pain c looking down, driving.   Chivo describes prior level of function full and painfree. Pt goals include resolve pain/N/T, be able to move neck and lift s pain so can return to work.  Past medical history was reviewed with Chivo. Significant findings include DM, diabetic polyneuropathy, benign prostatic hyperplasia, mixed hyperlipidemia.  Pt denies diplopia, dysarthria, dysphasia, dizziness, drop attacks, bowel/bladder changes, saddle anesthesia, and ARCELIA LE N/T.    Objective:   C7 spinous process palpated as slightly deviated R of center and is TTP R aspect  Pt reports no pain c c-spine ROM testing all directions today  However given RUE intermittent symptoms, performed MWM c transverse glide C7 R->L x10  Pt requires constant v/c for proper performance of each exercise, especially c scapular movement; pt has tendency to assume B rounded shoulder posture at all times unless cued to retract    Imagin23 CERVICAL MRI   FINDINGS:  Developmental shortening of the cervical pedicles is present, which reduces the baseline caliber of the central spinal canal on a developmental basis.  There is straightening of the cervical spine, with loss of usually observed lordosis. However there is no kyphosis. This appearance can be due to any of the following, including in combination: Neck pain, muscle spasm, cervical strain, cervical degenerative disease, and injury.  No subluxation is seen.   Degenerative changes the spine are present as described below.   DECRIPTION OF INDIVIDUAL DISC LEVELS   The craniocervical junction appears without disruption, malalignment or other active abnormality. The C1-2 articulation appears without disruption, or other active abnormality.   C2-C3:  Normal   C3-C4:  Disc degeneration with mild ventral ridging of the thecal sac from disc osteophyte complex along the posterior disc margin. No sign of cord contact, central canal stenosis, foraminal stenosis or  other compromise.   C4-C5:  Disc degeneration with ventral ridging of the thecal sac from disc osteophyte complex along the posterior disc margin.  Mild effacement of the thecal sac, no central canal or foraminal stenosis.   C5-C6:  Disc degeneration with ventral ridging of the thecal sac from disc osteophyte complex along the posterior disc margin.  Slightly greater degenerative change at this level, including visible narrowing of the joint space in the cephalocaudal dimension.  Effacement of the ventral thecal sac and slight contact with the ventral cord left of midline for example axial image 33 series 5 but no sign of cord compression or significant central canal stenosis.  No significant foraminal stenosis.   C6-C7:  Normal   C7-T1  Normal    CONCLUSION:  Relatively modest degenerative changes in the spine, without signs of central canal stenosis, foraminal stenosis, cord compression.  Chronic appearing straightening of the cervical spine.  No subluxation, fracture, lesion.  Spinal cord normal signal.     Treatment:  (\"NP\" indicates Not Performed this date)  Manual Therapy:  Added graston and manual STM R UT/levator C7-T1 R paraspinals    Neuromuscular Re-education:    Therapeutic Exercise:  Therapeutic Exercise: Eval 1-22-24 1-25-24   UBE fwd/retro  2minea    Open book R,L 5\"x10 5\"x10ea    Doorway pec stretch 10\"x3 20\"x3    Prone scap squeeze  5\"x10    Prone mid trap   5\"x10   Hooklying alt shld flex/ext  2min    Hooklying snow angels   1min   B row  GTB 5\"x20  GTB 5\"x20   B shld ER vs TB in mirror   R5\"x20   Single arm row                         Future visits: cont mid/low trap strength, postural cueing, scap retraction    HEP:    Review of pt's current exercise: pushups, shld abd c 5-10#wts, machines for: inner/outer thigh//HS curl/rows  Pt advised to d/ c shld abd with that amount of wt to avoid further compression/irritation of any nerves in brachial plexus/neck. Pt can do bicep curl if non-provoking,  as well as PT HEP as noted today.  Access Code: [email or web address] ; URL: [email or web address]  Date: 01/22/2024 ; Prepared by: Zeina Flores  Exercises  - Sidelying Thoracic Rotation with Open Book  - 2 x daily - 10 reps - 5 hold  - Doorway Pec Stretch at 60 Elevation  - 2 x daily - 3 reps - 10 hold  - Standing Row with Anchored Resistance GTB  - 2 x daily - 20 reps - 5 hold    Assessment/Plan: Pt tolerated session well today. He states he does not have much pain today, but still rates as 6/10. He demo no visible signs of pain, apprehension or facial grimacing. He does have palpated rotated C7 c tenderness R aspect, tx c MWM. Pt required max verbal cues for all ex's requiring scap retraction, as his default positioning is B rounded shoulders. Used mirror for visual feedback during some ex's. Cont to address deficits.    PLAN OF CARE:    Goals: (to be met in 8 visits)   Consistently decr pain < or = 1/10 intermittent for incr QOL and activity tolerance  Overall incr in function as indicated by decr NDI at least 10 pts  Painfree AROm c-spine and R 1st rib to allow improve neuromobility and decr symptoms  Improved postural awareness c incr axial elongation and scap retraction noted in therapy  Incr RUE MMT at least 1/2 grade painfree for incr mm support for functional activities and posture  Pt able to lift c proper ergonomics at least 20# to improve work tasks painfree  Indep HEP for progress toward functional goals    Frequency / Duration: Patient will be seen for 2 x/week or a total of 8 visits over a 90 day period.    All Treatments performed at distinct and separate times during the therapy session.  Treatment Minutes  Units charged   Manual Therapy 15 minutes 1   Therapeutic Activity 0 minutes    Neuromuscular Re-education 0 minutes    Therapeutic Exercise 30 minutes 2   Total Direct Treatment Time 45 minutes

## 2024-01-30 ENCOUNTER — OFFICE VISIT (OUTPATIENT)
Dept: PHYSICAL THERAPY | Age: 64
End: 2024-01-30
Attending: FAMILY MEDICINE
Payer: MEDICAID

## 2024-01-30 DIAGNOSIS — M54.12 CERVICAL RADICULOPATHY: Primary | ICD-10-CM

## 2024-01-30 PROCEDURE — 97140 MANUAL THERAPY 1/> REGIONS: CPT

## 2024-01-30 PROCEDURE — 97110 THERAPEUTIC EXERCISES: CPT

## 2024-01-30 NOTE — PROGRESS NOTES
PT DAILY NOTE  Diagnosis:   Cervical radiculopathy (M54.12)   Referring Provider: Wendi Arechiga DO Date of Evaluation:    1/22/2024    Precautions:  None Next MD visit:   none scheduled  Date of Surgery: n/a  Symptom onset: 6-6-23 after lifting a suitcase at work     Insurance Primary/Secondary: [place]/[facility] [employer]     Visit 3 of 8   Date POC Expires: 3-22-24       Subjective: Pt states his pain is decreasing.  Pain:   4/10   @eval: Chivo Miller is a 63 year old male who presents to therapy today with complaints of R neck and RUE pain since 'sprain' 6-6-23 when he lifted a suitcase at work. 'It felt like the arm fell out of the socket.' Pain was excruciating. Pain in R UT and R chest/clavicle and N/T that radiates down RUE to the fingers. When first onset, digits 2-3 were numb, now digits 3-5 are numb, comes and goes. He states he feels an 'electrical shock' in RUE whenever he moves the RUE, especially in the morning. Pt had 8 visits of PT last year ending 8-28-23, which pt states did not help. But slowly over time the pain has been decreasing in severity; he has been doing exercises at the gym x3/wk, but the pain persists at mild-moderate level. Pt is back sleeper, pain does not awaken him overnight anymore. Taking 500mg ibuprofen for pain control PRN, currently taking once about every other day. He gets relief c raising RUE overhead. Worse c cervical flexion. Pt is not currently working due to this injury; MD recommends he has more therapy first. Pt would normally work 16 hrs/day, 6 days a week as a . Pt is R hand dominant. The patient had been seen by Dr. Gandhi of orthopedics on 12/4/2023 in follow-up of his cervical radiculopathy.  He has also seen Dr. Forman of the pain clinic in September 2023.   Pt describes pain level at best 3/10, at worst 4/10.   Current functional limitations include UA to lift heavy items required for work, pain c looking down, driving.   Chivo  describes prior level of function full and painfree. Pt goals include resolve pain/N/T, be able to move neck and lift s pain so can return to work.  Past medical history was reviewed with Chivo. Significant findings include DM, diabetic polyneuropathy, benign prostatic hyperplasia, mixed hyperlipidemia.  Pt denies diplopia, dysarthria, dysphasia, dizziness, drop attacks, bowel/bladder changes, saddle anesthesia, and ARCELIA LE N/T.    Objective:     Imagin23 CERVICAL MRI   FINDINGS:  Developmental shortening of the cervical pedicles is present, which reduces the baseline caliber of the central spinal canal on a developmental basis.  There is straightening of the cervical spine, with loss of usually observed lordosis. However there is no kyphosis. This appearance can be due to any of the following, including in combination: Neck pain, muscle spasm, cervical strain, cervical degenerative disease, and injury.  No subluxation is seen.   Degenerative changes the spine are present as described below.   DECRIPTION OF INDIVIDUAL DISC LEVELS   The craniocervical junction appears without disruption, malalignment or other active abnormality. The C1-2 articulation appears without disruption, or other active abnormality.   C2-C3:  Normal   C3-C4:  Disc degeneration with mild ventral ridging of the thecal sac from disc osteophyte complex along the posterior disc margin. No sign of cord contact, central canal stenosis, foraminal stenosis or other compromise.   C4-C5:  Disc degeneration with ventral ridging of the thecal sac from disc osteophyte complex along the posterior disc margin.  Mild effacement of the thecal sac, no central canal or foraminal stenosis.   C5-C6:  Disc degeneration with ventral ridging of the thecal sac from disc osteophyte complex along the posterior disc margin.  Slightly greater degenerative change at this level, including visible narrowing of the joint space in the cephalocaudal dimension.   Effacement of the ventral thecal sac and slight contact with the ventral cord left of midline for example axial image 33 series 5 but no sign of cord compression or significant central canal stenosis.  No significant foraminal stenosis.   C6-C7:  Normal   C7-T1  Normal    CONCLUSION:  Relatively modest degenerative changes in the spine, without signs of central canal stenosis, foraminal stenosis, cord compression.  Chronic appearing straightening of the cervical spine.  No subluxation, fracture, lesion.  Spinal cord normal signal.     Treatment:  (\"NP\" indicates Not Performed this date)  Manual Therapy:  graston and manual STM R UT/levator C7-T1 R paraspinals  MWM R->L transverse mob at C7 c R rotation    Neuromuscular Re-education:    Therapeutic Exercise:  Therapeutic Exercise: Eval 1-22-24 1-25-24 1-30-24   UBE fwd/retro  2minea  2'ea   Open book R,L 5\"x10 5\"x10ea  5\"x10ea   Doorway pec stretch 10\"x3 20\"x3  20\"x3   Prone scap squeeze  5\"x10  5\"x10   Prone mid trap   5\"x10 5\"x15   Hooklying alt shld flex/ext  2min  2min   Hooklying snow angels   1min 1min   B row  GTB 5\"x20  GTB 5\"x20    B shld ER vs TB in mirror   R5\"x20 R5\"x20   Single arm row R,L    13# 2x10ea    BUE slide up wall c low-trap liftoff     3\"x10               Future visits: cont mid/low trap strength, postural cueing, scap retraction    HEP:    Review of pt's current exercise: pushups, shld abd c 5-10#wts, machines for: inner/outer thigh//HS curl/rows  Pt advised to d/ c shld abd with that amount of wt to avoid further compression/irritation of any nerves in brachial plexus/neck. Pt can do bicep curl if non-provoking, as well as PT HEP as noted today.  Access Code: [email or web address] ; URL: [email or web address]  Date: 01/22/2024 ; Prepared by: Zeina Flores  Exercises  - Sidelying Thoracic Rotation with Open Book  - 2 x daily - 10 reps - 5 hold  - Doorway Pec Stretch at 60 Elevation  - 2 x daily - 3 reps - 10 hold  - Standing Row with Anchored  Resistance GTB  - 2 x daily - 20 reps - 5 hold    Assessment/Plan: Pt reports decreasing severity of symptoms. Tolerated new ex's today to progress his  postural strength. Less cueing needed for standing scap retraction vs last session, but pt's default position remains B rounded shoulders. Cont to progress postural strength as tolerated.    PLAN OF CARE:    Goals: (to be met in 8 visits)   Consistently decr pain < or = 1/10 intermittent for incr QOL and activity tolerance  Overall incr in function as indicated by decr NDI at least 10 pts  Painfree AROm c-spine and R 1st rib to allow improve neuromobility and decr symptoms  Improved postural awareness c incr axial elongation and scap retraction noted in therapy  Incr RUE MMT at least 1/2 grade painfree for incr mm support for functional activities and posture  Pt able to lift c proper ergonomics at least 20# to improve work tasks painfree  Indep HEP for progress toward functional goals    Frequency / Duration: Patient will be seen for 2 x/week or a total of 8 visits over a 90 day period.    All Treatments performed at distinct and separate times during the therapy session.  Treatment Minutes  Units charged   Manual Therapy 15 minutes 1   Therapeutic Activity 0 minutes    Neuromuscular Re-education 0 minutes    Therapeutic Exercise 30 minutes 2   Total Direct Treatment Time 45 minutes

## 2024-01-30 NOTE — PROGRESS NOTES
Chivo Miller is a 63 year old presenting today to establish for type 2 diabetes management.   Primary care physician: Wendi Arechiga,   Most recent A1C 8.9% ( last A1C  9.5% )   BG today 192 mg/dl (fasting)     Monitoring blood glucose: 2 x daily  Fasting trends: between  mg/dl   After lunch readin- 285 mg/dl     Recently saw PCP and lantus dose was increased from 25--> 30 units (2024)     Diagnosed ~        Works as ; long hours.   Does exercise 5 d/week: cardio and weights       Recently noticed having hypoglycemia sxs. Has \"uneasiness and sweats\" in middle of night; sxs are occurring 2-3 d/week . Admits to eating fruit to relieve sxs. Does not routinely check BG when these sxs are occurring.     Recent cataract surgery: [facility]. Has f/u next month. Feels vision has really improved since surgery   Does have active neuropathy sxs , + ED       Diabetes History:  Type 2 DM ~   Patient has not had hospitalizations for blood sugar issues  denies any history of pancreatitis      Previous DM therapies:  Tresiba/Levemir : insurance formulary     Current DM Regimen:    Lantus solostar 30  units once daily   Metformin 1000mg twice daily --> dosing 1 tab in PM   Glipizide 10mg twice daily --> taking in AM       HGBA1C:    Lab Results   Component Value Date    A1C 8.9 (A) 2024    A1C 9.5 (A) 2024    A1C 9.0 (A) 10/16/2023     (H) 2016       Lab Results   Component Value Date    CHOLEST 242 (H) 2023    CHOLEST 254 (H) 2016    TRIG 168 (H) 2023    TRIG 144 2016    HDL 53 2023    HDL 56 2016     (H) 2023     (H) 2016     Lab Results   Component Value Date    MICROALBCREA 73.0 (H) 2023    MICROALBCREA  2016      Comment:        Unable to calculate due to Urine Microalbumin <0.5 mg/dL          Lab Results   Component Value Date    CREATSERUM 1.22 2023    CREATSERUM 1.39 (H)  11/16/2016    EGFRCR 67 06/12/2023     Lab Results   Component Value Date    AST 18 06/12/2023    AST 12 (L) 11/16/2016    ALT 23 06/12/2023    ALT 32 11/16/2016       Lab Results   Component Value Date    TSH 1.720 06/12/2023    TSH 1.400 07/18/2013         DM Complications:  Microvascular:   Neuropathy: yes  Retinopathy: no  Nephropathy: yes    Macrovascular:  PVD: no  CAD: no  Stroke/CVA: no        Modifying factors:  Medication adherence: yes    Recent steroids, illness or infections ( past 3m): no     Allergies: Patient has no known allergies.    Past Medical History:   Diagnosis Date    Allergic rhinitis, cause unspecified 06/29/2012    ED (erectile dysfunction) 07/15/2013    Pure hypercholesterolemia 07/29/2013    Type 2 diabetes mellitus with hyperglycemia, with long-term current use of insulin (HCC) 06/29/2012    Unspecified hearing loss 06/29/2012    Vitamin D deficiency 06/29/2012     History reviewed. No pertinent surgical history.  Social History     Socioeconomic History    Marital status:    Tobacco Use    Smoking status: Never    Smokeless tobacco: Never   Vaping Use    Vaping Use: Never used   Substance and Sexual Activity    Alcohol use: Yes     Alcohol/week: 0.0 standard drinks of alcohol     Comment: occa    Drug use: Yes     Types: Cannabis     Comment: occ    Sexual activity: Yes   Other Topics Concern     Service No    Blood Transfusions No    Caffeine Concern Yes     Comment: 1 cup daily    Occupational Exposure No    Hobby Hazards No    Sleep Concern No    Stress Concern No    Weight Concern No    Special Diet No    Back Care No    Exercise Yes     Comment: PT excersises at home    Bike Helmet No    Seat Belt No    Self-Exams No     Family History   Problem Relation Age of Onset    Stroke Mother     Other (liver cancer) Father      Current Medication List:   Current Outpatient Medications   Medication Sig Dispense Refill    empagliflozin (JARDIANCE) 10 MG Oral Tab Take 1  tablet (10 mg total) by mouth daily. 90 tablet 0    metFORMIN  MG Oral Tablet 24 Hr 2 TAB DAILY IN am 180 tablet 1    insulin glargine (LANTUS SOLOSTAR) 100 UNIT/ML Subcutaneous Solution Pen-injector Uses 25 units daily 15 mL 0    glipiZIDE 10 MG Oral Tab Take 1 tablet (10 mg total) by mouth daily with breakfast. 90 tablet 0    GABAPENTIN 100 MG Oral Cap TAKE 1 CAPSULE(100 MG) BY MOUTH THREE TIMES DAILY 90 capsule 0    ATORVASTATIN 20 MG Oral Tab TAKE 1 TABLET(20 MG) BY MOUTH EVERY NIGHT 90 tablet 0    dorzolamide-timolol 2-0.5 % Ophthalmic Solution       cyclobenzaprine 10 MG Oral Tab Take 1 tablet (10 mg total) by mouth nightly as needed.      brimonidine 0.2 % Ophthalmic Solution Place 1 drop into the right eye in the morning, at noon, and at bedtime.      tamsulosin 0.4 MG Oral Cap Take 1 capsule (0.4 mg total) by mouth daily. 90 capsule 3    GAVILYTE-G 236 g Oral Recon Soln TAKE 4000ML BY MOUTH ONCE FOR 1 DOSE AS DIRECTED BY GI CLINIC INSTRUCTIONS (Patient not taking: Reported on 1/16/2024)      ibuprofen 600 MG Oral Tab Take 1 tablet (600 mg total) by mouth every 6 (six) hours as needed for Pain.      Insulin Pen Needle (PEN NEEDLES) 32G X 6 MM Does not apply Misc 1 each at bedtime. Use 1 pen needle with the Lantus every night 100 each 1    Alcohol Swabs 70 % Does not apply Pads Take 1 Bottle by mouth As Directed.      Glucose Blood (TRUE METRIX BLOOD GLUCOSE TEST) In Vitro Strip Test twice daily. 100 strip 5    Lancets Does not apply Misc Test twice daily 100 each 0    Glucose Blood (TRUETRACK TEST) In Vitro Strip TEST TWICE DAILY 200 each 1           DM associated review of  symptoms:   Endocrine: Polyuria, polyphagia, polydipsia: no  Neurological: Paresthesias: yes LE , + ED   HEENT: Blurred vision: no  Skin: no rash or wounds  Hematological: Hypoglycemia: yes - see above       Review of Systems     LUNGS: denies shortness of breath   CARDIOVASCULAR: denies chest pain  GI: denies abdominal pain,  nausea or diarrhea   : denies dysuria  MUSCULOSKELETAL: denies pain        Physical exam:  /62   Pulse 69   Resp 16   Wt 182 lb 3.2 oz (82.6 kg)   SpO2 97%   BMI 25.41 kg/m²   Body mass index is 25.41 kg/m².    Physical Exam   Vitals reviewed.  Constitutional: Normal appearance   Cardiovascular: Normal rate , rhythm   Pulmonary/Chest: Effort normal  Neurological: Alert and oriented .   Psychiatric: Normal mood and affect.       Assessment/Plan:      Dyslipidemia:   Last  labs done 6-2023   Update lab ordered  Continue atorvastatin      Elevated urine protein   + urine 6-2023  No prior positive result; collected today   Consider ace/arb if positive     Type 2 diabetes mellitus with hyperglycemia, with long-term current use of insulin ([facility])    A1C: 8.9% ( last A1C 9.5%)   Weight: 182 lb     Diabetes control is sub optimal .  Reviewed with patient health impact associated with high glucose trends and the importance of better glucose control to prevent onset /progression of DM complications.   Recommendations:   Professional Kuldip continuous glucose sensor placed today to monitor 24 hour glucose trends over next 1-2 weeks      Decrease Lantus solostar: 30 -->  20 units once daily     Change Metformin IR 1000mg once daily in PM to Metformin XR 500mg  2 tab daily     Discussed the risk of and benefits of SGLT2 inhibitor class for treatment of type 2 Diabetes. This class of med results in the renal excretion of glucose and can lower blood sugars.   Egfr: > 30   Agreeable to therapy: Jardiance 10mg once daily rx     Patient was instructed to drink at least 4 eight ounce glasses of water daily to avoid dehydration.  Call if any rash, itching in genital area or painful urination develops   Sick day management discussed          Reviewed  clinical significance of A1c, adverse effects of suboptimal glucose control, and goals of therapy   Reviewed the A1C test, what the value reflects and the goal for the patient.    Reminded pt on A1C and blood sugar targets (Fasting < 130 and post prandial <180 ) and complications associated with hyperglycemia and uncontrolled DM (on AVS)   Recommended SMBG 2- x daily if not using CGM   Reviewed s/s and treatment of hypoglycemia (on AVS)   Reminded to continue with lifestyle modifications since they have positive impact on diabetes/blood sugars/health (portion control, physical activity, weight loss)   Reinforced timing and adherence with medication, self-monitoring of blood glucose and routine follow up    Recommended for  patient to follow up in Diabetes center to review carb goals, food label reading, and offer further support/guidance with exercise planning and weight loss.     The patient is asked to return in 1 week for nurse visit w justina pro and 4m w me in office, and  recommended to contact DM clinic sooner if questions or concerns.    The patient indicates understanding of these issues and agrees to the plan.      Orders Placed This Encounter    ASSAY QUANTITATIVE, GLUCOSE     Order Specific Question:   Release to patient     Answer:   Immediate    HEMOGLOBIN A1C     Order Specific Question:   Release to patient     Answer:   Immediate    Microalb/Creat Ratio, Random Urine     Standing Status:   Future     Number of Occurrences:   1     Standing Expiration Date:   1/31/2025     Order Specific Question:   Release to patient     Answer:   Immediate    Comp Metabolic Panel (14)     Standing Status:   Future     Standing Expiration Date:   1/31/2025    Lipid Panel     Standing Status:   Future     Standing Expiration Date:   1/31/2025    TSH W Reflex To Free T4     Standing Status:   Future     Standing Expiration Date:   1/31/2025     Order Specific Question:   Release to patient     Answer:   Immediate    empagliflozin (JARDIANCE) 10 MG Oral Tab     Sig: Take 1 tablet (10 mg total) by mouth daily.     Dispense:  90 tablet     Refill:  0    metFORMIN  MG Oral Tablet 24 Hr      Si TAB DAILY IN am     Dispense:  180 tablet     Refill:  1     Cancel 1000mg Metformin rx    insulin glargine (LANTUS SOLOSTAR) 100 UNIT/ML Subcutaneous Solution Pen-injector     Sig: Uses 25 units daily     Dispense:  15 mL     Refill:  0    glipiZIDE 10 MG Oral Tab     Sig: Take 1 tablet (10 mg total) by mouth daily with breakfast.     Dispense:  90 tablet     Refill:  0     DM quality  A1C/Blood pressure: as reported above   Nephropathy screenin + ; collected today; currently not on ace /arb rx.   LIPID screenin  . atorvastatin rx.   Last dilated eye exam: Last Dilated Eye Exam: 23   Exam shows retinopathy? Eye Exam shows Diabetic Retinopathy?: No  Last diabetic foot exam: Last Foot Exam: 24  Date of last PHQ-2 depression screen: No data recorded      Defer foot exam to follow up appointment     Spent 40 min obtaining patient history, evaluating patient, reviewing blood glucose trends, discussing treatment options, lifestyle modifications and completing documentation -  The risks and benefits of my recommendations, as well as other treatment options were discussed with the patient today. questions were also answered to the best of my knowledge.

## 2024-01-31 ENCOUNTER — OFFICE VISIT (OUTPATIENT)
Dept: ENDOCRINOLOGY CLINIC | Facility: CLINIC | Age: 64
End: 2024-01-31
Payer: MEDICAID

## 2024-01-31 VITALS
RESPIRATION RATE: 16 BRPM | HEART RATE: 69 BPM | OXYGEN SATURATION: 97 % | DIASTOLIC BLOOD PRESSURE: 62 MMHG | WEIGHT: 182.19 LBS | BODY MASS INDEX: 25 KG/M2 | SYSTOLIC BLOOD PRESSURE: 120 MMHG

## 2024-01-31 DIAGNOSIS — E11.65 TYPE 2 DIABETES MELLITUS WITH HYPERGLYCEMIA, WITH LONG-TERM CURRENT USE OF INSULIN (HCC): Primary | ICD-10-CM

## 2024-01-31 DIAGNOSIS — E78.5 DYSLIPIDEMIA: ICD-10-CM

## 2024-01-31 DIAGNOSIS — R80.9 URINE PROTEIN INCREASED: ICD-10-CM

## 2024-01-31 DIAGNOSIS — Z79.4 TYPE 2 DIABETES MELLITUS WITH HYPERGLYCEMIA, WITH LONG-TERM CURRENT USE OF INSULIN (HCC): Primary | ICD-10-CM

## 2024-01-31 PROBLEM — E11.21 DIABETIC NEPHROPATHY ASSOCIATED WITH TYPE 2 DIABETES MELLITUS (HCC): Status: ACTIVE | Noted: 2024-01-31

## 2024-01-31 PROBLEM — Z79.899 MEDICATION MANAGEMENT: Status: RESOLVED | Noted: 2023-11-02 | Resolved: 2024-01-31

## 2024-01-31 LAB
CARTRIDGE LOT#: 114 NUMERIC
CREAT UR-SCNC: 181 MG/DL
GLUCOSE BLOOD: 192
HEMOGLOBIN A1C: 8.9 % (ref 4.3–5.6)
MICROALBUMIN UR-MCNC: 1.3 MG/DL
MICROALBUMIN/CREAT 24H UR-RTO: 7.2 UG/MG (ref ?–30)
TEST STRIP LOT #: NORMAL NUMERIC

## 2024-01-31 PROCEDURE — 82947 ASSAY GLUCOSE BLOOD QUANT: CPT | Performed by: NURSE PRACTITIONER

## 2024-01-31 PROCEDURE — 3074F SYST BP LT 130 MM HG: CPT | Performed by: NURSE PRACTITIONER

## 2024-01-31 PROCEDURE — 82043 UR ALBUMIN QUANTITATIVE: CPT | Performed by: NURSE PRACTITIONER

## 2024-01-31 PROCEDURE — 82570 ASSAY OF URINE CREATININE: CPT | Performed by: NURSE PRACTITIONER

## 2024-01-31 PROCEDURE — 3078F DIAST BP <80 MM HG: CPT | Performed by: NURSE PRACTITIONER

## 2024-01-31 PROCEDURE — 99215 OFFICE O/P EST HI 40 MIN: CPT | Performed by: NURSE PRACTITIONER

## 2024-01-31 PROCEDURE — 83036 HEMOGLOBIN GLYCOSYLATED A1C: CPT | Performed by: NURSE PRACTITIONER

## 2024-01-31 RX ORDER — GLIPIZIDE 10 MG/1
10 TABLET ORAL
Qty: 90 TABLET | Refills: 0 | OUTPATIENT
Start: 2024-01-31

## 2024-01-31 RX ORDER — INSULIN GLARGINE 100 [IU]/ML
INJECTION, SOLUTION SUBCUTANEOUS
Qty: 15 ML | Refills: 0 | Status: SHIPPED | OUTPATIENT
Start: 2024-01-31

## 2024-01-31 RX ORDER — METFORMIN HYDROCHLORIDE 500 MG/1
TABLET, EXTENDED RELEASE ORAL
Qty: 180 TABLET | Refills: 1 | Status: SHIPPED | OUTPATIENT
Start: 2024-01-31

## 2024-01-31 NOTE — PROGRESS NOTES
A continuous glucose sensor for 24 hour blood sugar monitoring was placed on patient today per [facility] order.   Serial NUMBER: [id number]    - After cleansing skin with alcohol prep, Sensor (Kuldip pro )was inserted on  Left  Posterior upper arm area without difficulty. Small amount of skin prep was added around sensor tape after placement to help with sensor adhesive.    Area remains free of any bleeding or irritation or pain at site when patient left DM center.  Patient instructions:   Record daily food/drink intake and activity in log book  Call Diabetes center with any questions or concerns.   instructed to record food, exercise, insulin doses (if taking) on log provided

## 2024-01-31 NOTE — PATIENT INSTRUCTIONS
Your A1C: 8.9% ( last A1C 9.5%)   This is better but still  high for you and I am happy to  work together with you with improving your health.   The A1C:  The A1C test provides us with your average blood sugar for the past 3 months. Keeping an A1C less than 7% helps reduce or delay health problems that are related to diabetes.   The main goal of diabetes treatment is to keep your sugar from going too high to prevent or delay complications from the disease as well as maintain your quality of life.   The target for A1C is less than 7.0%      It is important to take all of your medications as prescribed. Please call me if you cannot get the prescriptions filled or are having issues with refills.   Please call me if you have any issues with medication questions, side effects, dosing questions or problems with your blood sugar trends BEFORE CHANGING OR STOPPING ANY MEDICATIONS.   MEDICATIONS:   Changes today   Stop Metformin 1000mg at bedtime  Start Metformin XR 500mg; 2 tab daily in morning   Decrease Lantus to 25 units once daily   Continue Glipizide 10mg once daily in morning   Start Jardiance 10mg once daily     ~ok to take w Metformin and GLIpizide   This medication will remove extra sugar out of blood into your urine.   This medication will not only help improve your blood sugars but have also shown to help protect the kidneys and heart.       It can be dosed anytime of day but morning is probably best time to take it due to increase in urination effect.     Please drink at least 4 glasses of water daily to avoid dehydration       It does not cause low blood sugars (hypoglycemia) . If you develop any low blood sugars, we will need to adjust other medications    Please call me if you develop any symptoms of urinary tract infection (burning with urination) or yeast infection (new rash itching or burning in the genital area)      If you are ever sick and not keeping fluids down, please hold the  Jardiance  until you  are tolerating fluids again              A continuous glucose sensor has been placed on the back of your arm. This device monitors  and records your blood  sugar levels for up to 14 days .   There is no need to interact with the sensor. Just go about your daily routine.   It is very rare to have any bleeding or pain at the sensor site ~ but if this does occur, please let me know.     Your sensor is water resistant and can be worn while bathing, showering or swimming  But it cannot be in water deeper than 3 feet or  under water for longer than 30 minutes.     If the sensor falls off within 4 days, please call the Diabetes center for a replacement.   If the sensor falls off after 4 days, ok to  place in a bag and bring it back to your appointment.    Always keep the sensor since we may still have enough information in the sensor to review your trends.     If you have to go for any xrays, MRI or CT scans, the sensor will have to be removed prior to the test. Feel free to remove the sensor but still keep the device for us to download the stored blood sugar readings.    If you are taking VITAMIN C: NO More than 500mg daily to avoid false low blood sugars     Removing the Sensor (if needed)   Pull up the edge of the adhesive that keeps your sensor attached to your skin. Slowly peel away from your skin in one motion (like pulling a bandaid off)  Sometimes, applying warm soapy water or lotion can help loosen the tape around the sensor.   Note: Any remaining adhesive residue on the skin can be removed with warm soapy water or rubbing alcohol.    If you have any questions or concerns please call or send secure message back via [employer].       Blood sugar targets:  Before breakfast:   (preferably less than 110)  2 hours After meals: less than 180 (preferably less than 150)   Please call me if you are having blood sugars less than 75 or more than 200 more than 2 days in a week time span.     Hypoglycemia:    Watch  for low blood sugars: (less than 70)  Symptoms of low blood sugar:   Shakiness or dizziness  Cold, clammy skin or sweating  Feeling hungry  Headache  Nervousness  A hard, fast heartbeat  Weakness  Confusion or irritability  Blurred eyesight  Having nightmares or waking up confused or sweating  Numbness or tingling in the lips or tongue     Treatment of Low Blood sugar Action Plan  1. Check blood glucose to be sure that it is low. You can’t always go by symptoms. If in doubt, treat your low blood glucose anyway.  2. Take 15 grams of carbohydrate (carb). Here are some choices:  4 oz. regular fruit juice  3-4 glucose tablets  6 oz. regular soda   7-8 jelly beans  3. Recheck blood glucose after 10-15 minutes. If blood glucose is still low (less than 70 mg/dl) repeat the treatment (step 2).  4. If your next meal is more than one hour away, eat a small snack.  5. If you’re not sure what caused your low blood glucose, call us .  6. Always check your blood glucose before you drive         Diabetes health monitoring      1. LABS: It is important to monitor your kidney function (blood and urine protein levels) , liver function tests and cholesterol levels when you have diabetes. If your primary care provider has not ordered these tests, I will order them for you.   2. FOOT exams:  daily foot inspections for foot wounds or skin changes are important for foot care. Any unusual changes should be reported immediately.  3. EYE exams: Checking your eyes for diabetes changes is important and you should have a dilated eye exam done by an eye doctor EVERY year since these changes occur in the BACK of the eye and not visible by you.     Office protocols:   My Chart Messages - Our goal is to respond to all My Chart messages within 2 business days of receipt. Messages received after 4 pm on Friday, will be addressed on Monday, except if holiday.   Please DO NOT send urgent messages through My Chart as these messages are not monitored  after hours.       Patient Calls -We make every attempt to answer all patient calls within 1 business day. Calls that come in after 4pm daily will be addressed the following business day. Nurses are available to answer your calls Monday - Friday from 8am - 4 pm except for holidays.      Refill policies:     Refills may be delayed if you have not had a recent office visit or recent labs as requested by your prescriber.       Our goal is to process your refill within 3  business day of receiving the refill request.   Contact your pharmacy at least 5 days prior to running out of medication and have them send an electronic request or submit a refill through My chart: select  “request refill” option in your [employer] account.  Refills are not addressed after hours or on weekends; covering providers  do not authorize routine medications on weekends.  Refills  received after 4pm on Fridays will be addressed on Mondays.    If  your prescription is due for a refill, and you are due for a follow up appointment., this will  may impact the ability for you to get a 90 supply if requested.   Yearly blood tests are  required for many medications to insure safe prescribing. If you are due for labs, you will have 30 days to complete the  requested labs before future refills are authorized.   In the event that your preferred pharmacy does not have the requested medication in stock (e.g. Backordered), it is your responsibility to find another pharmacy that has the requested medication available.  We will gladly send a new prescription to that pharmacy at your request.  To best provide you care, patients receiving routine medications need to be seen at least twice per year. However if the A1C is above 8% you will be need to be seen more frequently as recommended by provider and this will also impact your ability get obtain refills          Thank you   Jenniffer Sharma   [phone]

## 2024-01-31 NOTE — ASSESSMENT & PLAN NOTE
Reviewed with patient health impact associated with high glucose trends and the importance of better glucose control to prevent onset /progression of DM complications.

## 2024-02-02 ENCOUNTER — OFFICE VISIT (OUTPATIENT)
Dept: PHYSICAL THERAPY | Age: 64
End: 2024-02-02
Attending: FAMILY MEDICINE
Payer: MEDICAID

## 2024-02-02 ENCOUNTER — TELEPHONE (OUTPATIENT)
Facility: CLINIC | Age: 64
End: 2024-02-02

## 2024-02-02 PROCEDURE — 97140 MANUAL THERAPY 1/> REGIONS: CPT

## 2024-02-02 PROCEDURE — 97110 THERAPEUTIC EXERCISES: CPT

## 2024-02-02 NOTE — TELEPHONE ENCOUNTER
Patient has procedure on 2/14 and needs prep instructions. Patient indicates they can be sent through [employer], thanks.

## 2024-02-02 NOTE — PROGRESS NOTES
PT DAILY NOTE  Diagnosis:   Cervical radiculopathy (M54.12)   Referring Provider: Wendi Arechiga DO Date of Evaluation:    1/22/2024    Precautions:  None Next MD visit:   none scheduled  Date of Surgery: n/a  Symptom onset: 6-6-23 after lifting a suitcase at work     Insurance Primary/Secondary: [place]/[facility] [employer] PLAN     Visit 4 of 8 02/02/2024  Date POC Expires: 3-22-24       Subjective: Patient states he has been sleeping through the night and has not had to take pain medication compared to 2 months ago and continues to have improved symptoms overall, but with tingling down to his 3 fingers still present.   Pain:   4/10   @eval: Chivo Miller is a 63 year old male who presents to therapy today with complaints of R neck and RUE pain since 'sprain' 6-6-23 when he lifted a suitcase at work. 'It felt like the arm fell out of the socket.' Pain was excruciating. Pain in R UT and R chest/clavicle and N/T that radiates down RUE to the fingers. When first onset, digits 2-3 were numb, now digits 3-5 are numb, comes and goes. He states he feels an 'electrical shock' in RUE whenever he moves the RUE, especially in the morning. Pt had 8 visits of PT last year ending 8-28-23, which pt states did not help. But slowly over time the pain has been decreasing in severity; he has been doing exercises at the gym x3/wk, but the pain persists at mild-moderate level. Pt is back sleeper, pain does not awaken him overnight anymore. Taking 500mg ibuprofen for pain control PRN, currently taking once about every other day. He gets relief c raising RUE overhead. Worse c cervical flexion. Pt is not currently working due to this injury; MD recommends he has more therapy first. Pt would normally work 16 hrs/day, 6 days a week as a . Pt is R hand dominant. The patient had been seen by Dr. Gandhi of orthopedics on 12/4/2023 in follow-up of his cervical radiculopathy.  He has also seen Dr. Forman of the pain clinic in  2023.   Pt describes pain level at best 3/10, at worst 4/10.   Current functional limitations include UA to lift heavy items required for work, pain c looking down, driving.   Chivo describes prior level of function full and painfree. Pt goals include resolve pain/N/T, be able to move neck and lift s pain so can return to work.  Past medical history was reviewed with Chivo. Significant findings include DM, diabetic polyneuropathy, benign prostatic hyperplasia, mixed hyperlipidemia.  Pt denies diplopia, dysarthria, dysphasia, dizziness, drop attacks, bowel/bladder changes, saddle anesthesia, and ARCELIA LE N/T.    Objective: See exercise log     Imagin23 CERVICAL MRI   FINDINGS:  Developmental shortening of the cervical pedicles is present, which reduces the baseline caliber of the central spinal canal on a developmental basis.  There is straightening of the cervical spine, with loss of usually observed lordosis. However there is no kyphosis. This appearance can be due to any of the following, including in combination: Neck pain, muscle spasm, cervical strain, cervical degenerative disease, and injury.  No subluxation is seen.   Degenerative changes the spine are present as described below.   DECRIPTION OF INDIVIDUAL DISC LEVELS   The craniocervical junction appears without disruption, malalignment or other active abnormality. The C1-2 articulation appears without disruption, or other active abnormality.   C2-C3:  Normal   C3-C4:  Disc degeneration with mild ventral ridging of the thecal sac from disc osteophyte complex along the posterior disc margin. No sign of cord contact, central canal stenosis, foraminal stenosis or other compromise.   C4-C5:  Disc degeneration with ventral ridging of the thecal sac from disc osteophyte complex along the posterior disc margin.  Mild effacement of the thecal sac, no central canal or foraminal stenosis.   C5-C6:  Disc degeneration with ventral ridging of  the thecal sac from disc osteophyte complex along the posterior disc margin.  Slightly greater degenerative change at this level, including visible narrowing of the joint space in the cephalocaudal dimension.  Effacement of the ventral thecal sac and slight contact with the ventral cord left of midline for example axial image 33 series 5 but no sign of cord compression or significant central canal stenosis.  No significant foraminal stenosis.   C6-C7:  Normal   C7-T1  Normal    CONCLUSION:  Relatively modest degenerative changes in the spine, without signs of central canal stenosis, foraminal stenosis, cord compression.  Chronic appearing straightening of the cervical spine.  No subluxation, fracture, lesion.  Spinal cord normal signal.     Treatment:  (\"NP\" indicates Not Performed this date)  Manual Therapy:  graston and manual STM R UT/levator C7-T1 R paraspinals-NP  MWM R->L transverse mob at C7 c R rotation-NP    STM with Ball: R Pectorals  UT Release R     Neuromuscular Re-education:    Therapeutic Exercise:  Therapeutic Exercise: Eval 1-22-24 1-25-24 1-30-24 2-2-2024   UBE fwd/retro  2minea  2'ea 2'ea   Open book R,L 5\"x10 5\"x10ea  5\"x10ea 5\"x10ea   Added Supine Chin Tucks     x20 5\" H   Doorway pec stretch 10\"x3 20\"x3  20\"x3 20\"x3   Prone scap squeeze  5\"x10  5\"x10 5\"x10   Prone mid trap   5\"x10 5\"x15 5\"x15   Hooklying alt shld flex/ext  2min  2min 2min   Hooklying snow angels   1min 1min    1min   B row  GTB 5\"x20  GTB 5\"x20   GTB   5\"x20   B shld ER vs TB in mirror   R5\"x20 R5\"x20    Single arm row R,L    13# 2x10ea     BUE slide up wall c low-trap liftoff     3\"x10     Added Standing Sh Ext RTB      X10 5\" H       Future visits: cont mid/low trap strength, postural cueing, scap retraction    HEP:    Review of pt's current exercise: pushups, shld abd c 5-10#wts, machines for: inner/outer thigh//HS curl/rows  Pt advised to d/ c shld abd with that amount of wt to avoid further compression/irritation of  any nerves in brachial plexus/neck. Pt can do bicep curl if non-provoking, as well as PT HEP as noted today.  Access Code: [email or web address] ; URL: [email or web address]  Date: 01/22/2024 ; Prepared by: Zeina Flores  Exercises  - Sidelying Thoracic Rotation with Open Book  - 2 x daily - 10 reps - 5 hold  - Doorway Pec Stretch at 60 Elevation  - 2 x daily - 3 reps - 10 hold  - Standing Row with Anchored Resistance GTB  - 2 x daily - 20 reps - 5 hold    Assessment/Plan: Continued postural strengthening progression, resumed supine chin tucks to facilitate retracted position and neutral alignment of cervical and thoracic spine. Patient continues to require cues for reduced UT firing, more so on right but able to mostly correct. Continue postural endurance and strength training with reminder cues for posture awareness      PLAN OF CARE:    Goals: (to be met in 8 visits)   Consistently decr pain < or = 1/10 intermittent for incr QOL and activity tolerance  Overall incr in function as indicated by decr NDI at least 10 pts  Painfree AROm c-spine and R 1st rib to allow improve neuromobility and decr symptoms  Improved postural awareness c incr axial elongation and scap retraction noted in therapy  Incr RUE MMT at least 1/2 grade painfree for incr mm support for functional activities and posture  Pt able to lift c proper ergonomics at least 20# to improve work tasks painfree  Indep HEP for progress toward functional goals    Frequency / Duration: Patient will be seen for 2 x/week or a total of 8 visits over a 90 day period.    All Treatments performed at distinct and separate times during the therapy session.  Treatment Minutes  Units charged   Manual Therapy 15 minutes 1   Therapeutic Activity 0 minutes    Neuromuscular Re-education 0 minutes    Therapeutic Exercise 30 minutes 2   Total Direct Treatment Time 45 minutes

## 2024-02-05 ENCOUNTER — OFFICE VISIT (OUTPATIENT)
Dept: PHYSICAL THERAPY | Age: 64
End: 2024-02-05
Attending: FAMILY MEDICINE
Payer: MEDICAID

## 2024-02-05 PROCEDURE — 97140 MANUAL THERAPY 1/> REGIONS: CPT

## 2024-02-05 PROCEDURE — 97110 THERAPEUTIC EXERCISES: CPT

## 2024-02-05 NOTE — PROGRESS NOTES
PT DAILY NOTE  Diagnosis:   Cervical radiculopathy (M54.12)   Referring Provider: Wendi Arechiga DO Date of Evaluation:    1/22/2024    Precautions:  None Next MD visit:   none scheduled  Date of Surgery: n/a  Symptom onset: 6-6-23 after lifting a suitcase at work     Insurance Primary/Secondary: [place]/[facility]     Visit 5 of 8 02/05/2024  Date POC Expires: 3-22-24       Subjective: Patient states he continues to have tingling/numbness in his right hand, but says pain has continued to be less on his right shoulder. Patient reports tingling is alleviated with manual pec stretch, but numbness remains   Pain:   3/10   @eval: Chivo Miller is a 63 year old male who presents to therapy today with complaints of R neck and RUE pain since 'sprain' 6-6-23 when he lifted a suitcase at work. 'It felt like the arm fell out of the socket.' Pain was excruciating. Pain in R UT and R chest/clavicle and N/T that radiates down RUE to the fingers. When first onset, digits 2-3 were numb, now digits 3-5 are numb, comes and goes. He states he feels an 'electrical shock' in RUE whenever he moves the RUE, especially in the morning. Pt had 8 visits of PT last year ending 8-28-23, which pt states did not help. But slowly over time the pain has been decreasing in severity; he has been doing exercises at the gym x3/wk, but the pain persists at mild-moderate level. Pt is back sleeper, pain does not awaken him overnight anymore. Taking 500mg ibuprofen for pain control PRN, currently taking once about every other day. He gets relief c raising RUE overhead. Worse c cervical flexion. Pt is not currently working due to this injury; MD recommends he has more therapy first. Pt would normally work 16 hrs/day, 6 days a week as a . Pt is R hand dominant. The patient had been seen by Dr. Gandhi of orthopedics on 12/4/2023 in follow-up of his cervical radiculopathy.  He has also seen Dr. Forman of the pain clinic in  2023.   Pt describes pain level at best 3/10, at worst 4/10.   Current functional limitations include UA to lift heavy items required for work, pain c looking down, driving.   Chivo describes prior level of function full and painfree. Pt goals include resolve pain/N/T, be able to move neck and lift s pain so can return to work.  Past medical history was reviewed with Chivo. Significant findings include DM, diabetic polyneuropathy, benign prostatic hyperplasia, mixed hyperlipidemia.  Pt denies diplopia, dysarthria, dysphasia, dizziness, drop attacks, bowel/bladder changes, saddle anesthesia, and ARCELIA LE N/T.    Objective: See exercise log     Imagin23 CERVICAL MRI   FINDINGS:  Developmental shortening of the cervical pedicles is present, which reduces the baseline caliber of the central spinal canal on a developmental basis.  There is straightening of the cervical spine, with loss of usually observed lordosis. However there is no kyphosis. This appearance can be due to any of the following, including in combination: Neck pain, muscle spasm, cervical strain, cervical degenerative disease, and injury.  No subluxation is seen.   Degenerative changes the spine are present as described below.   DECRIPTION OF INDIVIDUAL DISC LEVELS   The craniocervical junction appears without disruption, malalignment or other active abnormality. The C1-2 articulation appears without disruption, or other active abnormality.   C2-C3:  Normal   C3-C4:  Disc degeneration with mild ventral ridging of the thecal sac from disc osteophyte complex along the posterior disc margin. No sign of cord contact, central canal stenosis, foraminal stenosis or other compromise.   C4-C5:  Disc degeneration with ventral ridging of the thecal sac from disc osteophyte complex along the posterior disc margin.  Mild effacement of the thecal sac, no central canal or foraminal stenosis.   C5-C6:  Disc degeneration with ventral ridging of  the thecal sac from disc osteophyte complex along the posterior disc margin.  Slightly greater degenerative change at this level, including visible narrowing of the joint space in the cephalocaudal dimension.  Effacement of the ventral thecal sac and slight contact with the ventral cord left of midline for example axial image 33 series 5 but no sign of cord compression or significant central canal stenosis.  No significant foraminal stenosis.   C6-C7:  Normal   C7-T1  Normal    CONCLUSION:  Relatively modest degenerative changes in the spine, without signs of central canal stenosis, foraminal stenosis, cord compression.  Chronic appearing straightening of the cervical spine.  No subluxation, fracture, lesion.  Spinal cord normal signal.     Treatment:  (\"NP\" indicates Not Performed this date)  Manual Therapy:  graston and manual STM R UT/levator C7-T1 R paraspinals-NP  MWM R->L transverse mob at C7 c R rotation-NP    STM with Ball: R Pectorals  Added Cupping: R Deltoid, Bicep Pec Origin 6'    Neuromuscular Re-education:    Therapeutic Exercise:  Therapeutic Exercise: Eval 1-22-24 1-25-24 1-30-24 2-2-2024 2-5-2024   UBE fwd/retro  2minea  2'ea 2'ea 2'ea   Open book R,L 5\"x10 5\"x10ea  5\"x10ea 5\"x10ea 5\"x10ea   Supine Chin Tucks     x20 5\" H x20 5\" H   Added 1st Rib Mob w Strap     10x10\" R   Doorway pec stretch 10\"x3 20\"x3  20\"x3 20\"x3 20\"x3   Seated (instead of prone) scap squeeze  5\"x10  5\"x10 5\"x10 5\"x20   Prone mid trap   5\"x10 5\"x15 5\"x15    Added 1/2 FR Hooklying alt shld flex/ext  2min  2min 2min x20 ea   Added 1/2 FR Hooklying snow angels   1min 1min    1min  x20 ea   B row  GTB 5\"x20  GTB 5\"x20   GTB   5\"x20    B shld ER vs TB in mirror   R5\"x20 R5\"x20     Single arm row R,L    13# 2x10ea      BUE slide up wall c low-trap liftoff     3\"x10      Added Standing Sh Ext RTB      X10 5\" H        Future visits: cont mid/low trap strength, postural cueing, scap retraction    HEP:    Review of pt's current  exercise: pushups, shld abd c 5-10#wts, machines for: inner/outer thigh//HS curl/rows  Pt advised to d/ c shld abd with that amount of wt to avoid further compression/irritation of any nerves in brachial plexus/neck. Pt can do bicep curl if non-provoking, as well as PT HEP as noted today.  Access Code: [email or web address] ; URL: [email or web address]  Date: 01/22/2024 ; Prepared by: Zeina Flores  Exercises  - Sidelying Thoracic Rotation with Open Book  - 2 x daily - 10 reps - 5 hold  - Doorway Pec Stretch at 60 Elevation  - 2 x daily - 3 reps - 10 hold  - Standing Row with Anchored Resistance GTB  - 2 x daily - 20 reps - 5 hold    Assessment/Plan: Patient continues to demo weakness with scapular retraction against gravity, modified from prone position to seated for improved mechanics and general posture carry over. Added 1st rib mobilization with strap and UT stretch to address tension remaining in lateral cervical muscles. Added cupping to lateral and anterior deltoid and biceps to address tension that may be contributing to numbness/tingling in right hand. Patient would benefit from adding pec mobilization with ball on wall next visit       PLAN OF CARE:    Goals: (to be met in 8 visits)   Consistently decr pain < or = 1/10 intermittent for incr QOL and activity tolerance  Overall incr in function as indicated by decr NDI at least 10 pts  Painfree AROm c-spine and R 1st rib to allow improve neuromobility and decr symptoms  Improved postural awareness c incr axial elongation and scap retraction noted in therapy  Incr RUE MMT at least 1/2 grade painfree for incr mm support for functional activities and posture  Pt able to lift c proper ergonomics at least 20# to improve work tasks painfree  Indep HEP for progress toward functional goals    Frequency / Duration: Patient will be seen for 2 x/week or a total of 8 visits over a 90 day period.    All Treatments performed at distinct and separate times during the therapy  session.  Treatment Minutes  Units charged   Manual Therapy 10 minutes 1   Therapeutic Activity 0 minutes    Neuromuscular Re-education 0 minutes    Therapeutic Exercise 35 minutes 2   Total Direct Treatment Time 45 minutes

## 2024-02-06 NOTE — PROGRESS NOTES
PT DAILY NOTE  Diagnosis:   Cervical radiculopathy (M54.12)   Referring Provider: Wendi Arechiga DO Date of Evaluation:    1/22/2024    Precautions:  None Next MD visit:   none scheduled  Date of Surgery: n/a  Symptom onset: 6-6-23 after lifting a suitcase at work     Insurance Primary/Secondary: [place]/[facility] FAMILY HEALTH PLAN     Visit 6 of 8    Date POC Expires: 3-22-24       Subjective: Patient states his tingling is intermittent, and isolated to fingerdips of R digits 3/4/5.   Pain:   0/10   @eval: Chivo Miller is a 63 year old male who presents to therapy today with complaints of R neck and RUE pain since 'sprain' 6-6-23 when he lifted a suitcase at work. 'It felt like the arm fell out of the socket.' Pain was excruciating. Pain in R UT and R chest/clavicle and N/T that radiates down RUE to the fingers. When first onset, digits 2-3 were numb, now digits 3-5 are numb, comes and goes. He states he feels an 'electrical shock' in RUE whenever he moves the RUE, especially in the morning. Pt had 8 visits of PT last year ending 8-28-23, which pt states did not help. But slowly over time the pain has been decreasing in severity; he has been doing exercises at the gym x3/wk, but the pain persists at mild-moderate level. Pt is back sleeper, pain does not awaken him overnight anymore. Taking 500mg ibuprofen for pain control PRN, currently taking once about every other day. He gets relief c raising RUE overhead. Worse c cervical flexion. Pt is not currently working due to this injury; MD recommends he has more therapy first. Pt would normally work 16 hrs/day, 6 days a week as a . Pt is R hand dominant. The patient had been seen by Dr. Gandhi of orthopedics on 12/4/2023 in follow-up of his cervical radiculopathy.  He has also seen Dr. Forman of the pain clinic in September 2023.   Pt describes pain level at best 3/10, at worst 4/10.   Current functional limitations include UA to lift heavy items required  for work, pain c looking down, driving.   [place] describes prior level of function full and painfree. Pt goals include resolve pain/N/T, be able to move neck and lift s pain so can return to work.  Past medical history was reviewed with Chivo. Significant findings include DM, diabetic polyneuropathy, benign prostatic hyperplasia, mixed hyperlipidemia.  Pt denies diplopia, dysarthria, dysphasia, dizziness, drop attacks, bowel/bladder changes, saddle anesthesia, and ARCELIA LE N/T.    Objective:   Pt reports resolution of N/T in R 3rd DIP during manual cervical distraction     Imagin23 CERVICAL MRI   FINDINGS:  Developmental shortening of the cervical pedicles is present, which reduces the baseline caliber of the central spinal canal on a developmental basis.  There is straightening of the cervical spine, with loss of usually observed lordosis. However there is no kyphosis. This appearance can be due to any of the following, including in combination: Neck pain, muscle spasm, cervical strain, cervical degenerative disease, and injury.  No subluxation is seen.   Degenerative changes the spine are present as described below.   DECRIPTION OF INDIVIDUAL DISC LEVELS   The craniocervical junction appears without disruption, malalignment or other active abnormality. The C1-2 articulation appears without disruption, or other active abnormality.   C2-C3:  Normal   C3-C4:  Disc degeneration with mild ventral ridging of the thecal sac from disc osteophyte complex along the posterior disc margin. No sign of cord contact, central canal stenosis, foraminal stenosis or other compromise.   C4-C5:  Disc degeneration with ventral ridging of the thecal sac from disc osteophyte complex along the posterior disc margin.  Mild effacement of the thecal sac, no central canal or foraminal stenosis.   C5-C6:  Disc degeneration with ventral ridging of the thecal sac from disc osteophyte complex along the posterior disc margin.   Slightly greater degenerative change at this level, including visible narrowing of the joint space in the cephalocaudal dimension.  Effacement of the ventral thecal sac and slight contact with the ventral cord left of midline for example axial image 33 series 5 but no sign of cord compression or significant central canal stenosis.  No significant foraminal stenosis.   C6-C7:  Normal   C7-T1  Normal    CONCLUSION:  Relatively modest degenerative changes in the spine, without signs of central canal stenosis, foraminal stenosis, cord compression.  Chronic appearing straightening of the cervical spine.  No subluxation, fracture, lesion.  Spinal cord normal signal.     Treatment:  (\"NP\" indicates Not Performed this date)  Manual Therapy:  SOR  Manual cervical distraction 5min  R 1st rib mob  MWM R->L transverse mob at C7 c R rotation-NP    Neuromuscular Re-education:    Therapeutic Exercise: Eval 1-22-24 1-25-24 1-30-24 2-2-2024 2-5-2024 2-7-24   UBE fwd/retro  2minea  2'ea 2'ea 2'ea 2'ea   Open book R,L 5\"x10 5\"x10ea  5\"x10ea 5\"x10ea 5\"x10ea 5\"x10ea   Supine Chin Tucks     x20 5\" H x20 5\" H 5\"x20   Hooklying alt shld flex/ext 1/2 FR  2min 2min 2min x20ea 2min   Hooklying snow angels  1min 1min 1min +1/2 FR  X20ea 1/2 FR 1min   Supine 'T' pec stretch 1/2 FR      2min   Prone mid trap  5\"x10 5\"x15 5\"x15  5\"x20   R 1st Rib Mob w Strap     10x10\" R 5\"x15   Doorway pec stretch 10\"x3 20\"x3  20\"x3 20\"x3 20\"x3    Seated (instead of prone) scap squeeze  5\"x10  5\"x10 5\"x10 5\"x20    B row  GTB 5\"x20  GTB 5\"x20   GTB   5\"x20     B shld ER vs TB in mirror   R5\"x20 R5\"x20      Single arm row R,L    13# 2x10ea   13# 2x10e    BUE slide up wall c low-trap liftoff     3\"x10   3\"x10    Standing Sh Ext vs TB      R5\"x10   G5\"x20      Future visits: cont mid/low trap strength, postural cueing, scap retraction    HEP:    Review of pt's current exercise: pushups, shld abd c 5-10#wts, machines for: inner/outer thigh//HS  curl/rows  Pt advised to d/ c shld abd with that amount of wt to avoid further compression/irritation of any nerves in brachial plexus/neck. Pt can do bicep curl if non-provoking, as well as PT HEP as noted today.  Access Code: [email or web address] ; URL: [email or web address]  Date: 01/22/2024 ; Prepared by: Zeina Flores  Exercises  - Sidelying Thoracic Rotation with Open Book  - 2 x daily - 10 reps - 5 hold  - Doorway Pec Stretch at 60 Elevation  - 2 x daily - 3 reps - 10 hold  - Standing Row with Anchored Resistance GTB  - 2 x daily - 20 reps - 5 hold    Assessment/Plan: Patient reports pain resolution. Remaining symtpoms are intermittent tingling of R DIPS 3-4-5. Today this resolved in digit 3 during manual cervical distraction. His continued postural impairments contribute to symtpoms. Progressed efforts to open anterior shld c focus on scap retraction, and improve 1st rib mobility to promote this. Continue to progress postural strengthening.      PLAN OF CARE:    Goals: (to be met in 8 visits)   Consistently decr pain < or = 1/10 intermittent for incr QOL and activity tolerance  Overall incr in function as indicated by decr NDI at least 10 pts  Painfree AROm c-spine and R 1st rib to allow improve neuromobility and decr symptoms  Improved postural awareness c incr axial elongation and scap retraction noted in therapy  Incr RUE MMT at least 1/2 grade painfree for incr mm support for functional activities and posture  Pt able to lift c proper ergonomics at least 20# to improve work tasks painfree  Indep HEP for progress toward functional goals    Frequency / Duration: Patient will be seen for 2 x/week or a total of 8 visits over a 90 day period.    All Treatments performed at distinct and separate times during the therapy session.  Treatment Minutes  Units charged   Manual Therapy 6 minutes    Therapeutic Activity 0 minutes    Neuromuscular Re-education 0 minutes    Therapeutic Exercise 40 minutes 3   Total Direct  Treatment Time 46 minutes

## 2024-02-07 ENCOUNTER — NURSE ONLY (OUTPATIENT)
Dept: ENDOCRINOLOGY CLINIC | Facility: CLINIC | Age: 64
End: 2024-02-07
Payer: MEDICAID

## 2024-02-07 ENCOUNTER — OFFICE VISIT (OUTPATIENT)
Dept: PHYSICAL THERAPY | Age: 64
End: 2024-02-07
Attending: FAMILY MEDICINE
Payer: MEDICAID

## 2024-02-07 ENCOUNTER — TELEPHONE (OUTPATIENT)
Dept: ENDOCRINOLOGY CLINIC | Facility: CLINIC | Age: 64
End: 2024-02-07

## 2024-02-07 DIAGNOSIS — E11.65 TYPE 2 DIABETES MELLITUS WITH HYPERGLYCEMIA, WITH LONG-TERM CURRENT USE OF INSULIN (HCC): Primary | ICD-10-CM

## 2024-02-07 DIAGNOSIS — M54.12 CERVICAL RADICULOPATHY: Primary | ICD-10-CM

## 2024-02-07 DIAGNOSIS — Z79.4 TYPE 2 DIABETES MELLITUS WITH HYPERGLYCEMIA, WITH LONG-TERM CURRENT USE OF INSULIN (HCC): Primary | ICD-10-CM

## 2024-02-07 PROCEDURE — 95251 CONT GLUC MNTR ANALYSIS I&R: CPT | Performed by: NURSE PRACTITIONER

## 2024-02-07 PROCEDURE — 97110 THERAPEUTIC EXERCISES: CPT

## 2024-02-07 PROCEDURE — 95250 CONT GLUC MNTR PHYS/QHP EQP: CPT | Performed by: NURSE PRACTITIONER

## 2024-02-07 NOTE — TELEPHONE ENCOUNTER
at last visit placed [facility] Analysis of data: 1.31.2024 thru 2.7.2024   Sensor download: full report in media  Average glucose : 136 mg/dl       CV (coefficient of variation) : 34.2%   GMI (estimated A1C) 6.6 %    12% time above 180mg /dl  ( last download: -%)      2% time above 250 mg/dl ( last download: -%)      77% time in target range:  mg/dl ( last download: -%)      3% time below 70mg/dl ( last download: -%)      3 %time below 54mg/dl ( last download: -%)      Findings:   1. Pattern of hypoglycemia: 6% target < 5%   Highest risk overnight    2. Time in target: 77 % (ADA recommended goal > 70%)   3.  Minimal postprandial elevation  4. Normal glucose variability    DM meds:      Decreased Lantus solostar: 20 units -->but  pt dosing  25 units once daily      Metformin XR 500mg  2 tab daily   Jardiance 10mg once daily rx        Due to hypoglycemia on CGM, decrease Lantus 25 -->  18 units once daily   Continue   Metformin XR 500mg  2 tab daily   Jardiance 10mg once daily rx     Pt verbalized understanding

## 2024-02-09 ENCOUNTER — TELEPHONE (OUTPATIENT)
Facility: CLINIC | Age: 64
End: 2024-02-09

## 2024-02-09 NOTE — TELEPHONE ENCOUNTER
Spoke with patient he misplaced prep instructions and can't et into mychart so he will  today around 3pm.

## 2024-02-11 DIAGNOSIS — Z79.4 TYPE 2 DIABETES MELLITUS WITH HYPERGLYCEMIA, WITH LONG-TERM CURRENT USE OF INSULIN (HCC): ICD-10-CM

## 2024-02-11 DIAGNOSIS — E11.65 TYPE 2 DIABETES MELLITUS WITH HYPERGLYCEMIA, WITH LONG-TERM CURRENT USE OF INSULIN (HCC): ICD-10-CM

## 2024-02-12 ENCOUNTER — OFFICE VISIT (OUTPATIENT)
Dept: PHYSICAL THERAPY | Age: 64
End: 2024-02-12
Attending: FAMILY MEDICINE
Payer: MEDICAID

## 2024-02-12 DIAGNOSIS — M54.12 CERVICAL RADICULOPATHY: Primary | ICD-10-CM

## 2024-02-12 PROCEDURE — 97110 THERAPEUTIC EXERCISES: CPT

## 2024-02-12 RX ORDER — GLIPIZIDE 10 MG/1
10 TABLET ORAL
Qty: 180 TABLET | Refills: 0 | OUTPATIENT
Start: 2024-02-12

## 2024-02-12 NOTE — PROGRESS NOTES
PT DAILY NOTE  Diagnosis:   Cervical radiculopathy (M54.12)   Referring Provider: Wendi Arechiga DO Date of Evaluation:    1/22/2024    Precautions:  None Next MD visit:   none scheduled  Date of Surgery: n/a  Symptom onset: 6-6-23 after lifting a suitcase at work     Insurance Primary/Secondary: [place]/[facility] [employer] [employer]     Visit 7 of 8    Date POC Expires: 3-22-24       Subjective: Patient states his tingling is intermittent, and isolated to only 5th DIP today.   Pain:   0/10 Pain is not a current complaint  @eval: Chivo Miller is a 63 year old male who presents to therapy today with complaints of R neck and RUE pain since 'sprain' 6-6-23 when he lifted a suitcase at work. 'It felt like the arm fell out of the socket.' Pain was excruciating. Pain in R UT and R chest/clavicle and N/T that radiates down RUE to the fingers. When first onset, digits 2-3 were numb, now digits 3-5 are numb, comes and goes. He states he feels an 'electrical shock' in RUE whenever he moves the RUE, especially in the morning. Pt had 8 visits of PT last year ending 8-28-23, which pt states did not help. But slowly over time the pain has been decreasing in severity; he has been doing exercises at the gym x3/wk, but the pain persists at mild-moderate level. Pt is back sleeper, pain does not awaken him overnight anymore. Taking 500mg ibuprofen for pain control PRN, currently taking once about every other day. He gets relief c raising RUE overhead. Worse c cervical flexion. Pt is not currently working due to this injury; MD recommends he has more therapy first. Pt would normally work 16 hrs/day, 6 days a week as a . Pt is R hand dominant. The patient had been seen by Dr. Gandhi of orthopedics on 12/4/2023 in follow-up of his cervical radiculopathy.  He has also seen Dr. Forman of the pain clinic in September 2023.   Pt describes pain level at best 3/10, at worst 4/10.   Current functional limitations include UA to lift  heavy items required for work, pain c looking down, driving.   Chivo describes prior level of function full and painfree. Pt goals include resolve pain/N/T, be able to move neck and lift s pain so can return to work.  Past medical history was reviewed with Chivo. Significant findings include DM, diabetic polyneuropathy, benign prostatic hyperplasia, mixed hyperlipidemia.  Pt denies diplopia, dysarthria, dysphasia, dizziness, drop attacks, bowel/bladder changes, saddle anesthesia, and ARCELIA LE N/T.    Objective:     Imagin23 CERVICAL MRI   FINDINGS:  Developmental shortening of the cervical pedicles is present, which reduces the baseline caliber of the central spinal canal on a developmental basis.  There is straightening of the cervical spine, with loss of usually observed lordosis. However there is no kyphosis. This appearance can be due to any of the following, including in combination: Neck pain, muscle spasm, cervical strain, cervical degenerative disease, and injury.  No subluxation is seen.   Degenerative changes the spine are present as described below.   DECRIPTION OF INDIVIDUAL DISC LEVELS   The craniocervical junction appears without disruption, malalignment or other active abnormality. The C1-2 articulation appears without disruption, or other active abnormality.   C2-C3:  Normal   C3-C4:  Disc degeneration with mild ventral ridging of the thecal sac from disc osteophyte complex along the posterior disc margin. No sign of cord contact, central canal stenosis, foraminal stenosis or other compromise.   C4-C5:  Disc degeneration with ventral ridging of the thecal sac from disc osteophyte complex along the posterior disc margin.  Mild effacement of the thecal sac, no central canal or foraminal stenosis.   C5-C6:  Disc degeneration with ventral ridging of the thecal sac from disc osteophyte complex along the posterior disc margin.  Slightly greater degenerative change at this level, including  visible narrowing of the joint space in the cephalocaudal dimension.  Effacement of the ventral thecal sac and slight contact with the ventral cord left of midline for example axial image 33 series 5 but no sign of cord compression or significant central canal stenosis.  No significant foraminal stenosis.   C6-C7:  Normal   C7-T1  Normal    CONCLUSION:  Relatively modest degenerative changes in the spine, without signs of central canal stenosis, foraminal stenosis, cord compression.  Chronic appearing straightening of the cervical spine.  No subluxation, fracture, lesion.  Spinal cord normal signal.     Treatment:  (\"NP\" indicates Not Performed this date)  Manual Therapy:  SOR  Manual cervical distraction 5min    Neuromuscular Re-education:    Therapeutic Exercise: Eval 1-22-24 1-25-24 1-30-24 2-2-2024 2-5-2024 2-7-24 2-12-24   UBE fwd/retro  2minea  2'ea 2'ea 2'ea 2'ea 2'ea   Open book R,L 5\"x10 5\"x10ea  5\"x10ea 5\"x10ea 5\"x10ea 5\"x10ea    Supine Chin Tucks     x20 5\" H x20 5\" H 5\"x20 5\"x20   Hooklying alt shld flex/ext 1/2 FR  2min 2min 2min x20ea 2min 2min   Hooklying snow angels  1min 1min 1min +1/2 FR  X20ea 1/2 FR 1min 1min   Supine 'T' pec stretch 1/2 FR      2min 2min   Prone mid trap  5\"x10 5\"x15 5\"x15  5\"x20 5\"x20   R 1st Rib Mob w Strap     10x10\" R 5\"x15    Doorway pec stretch 10\"x3 20\"x3  20\"x3 20\"x3 20\"x3     Seated (instead of prone) scap squeeze  5\"x10  5\"x10 5\"x10 5\"x20     B row  GTB 5\"x20  GTB 5\"x20   GTB   5\"x20      B shld ER vs TB in mirror   R5\"x20 R5\"x20       Single arm row R,L    13# 2x10ea   13# 2x10 13#2x10    BUE slide up wall c low-trap liftoff     3\"x10   3\"x10 3\"x10    Standing Sh Ext vs TB      R5\"x10   G5\"x20        HEP:    Review of pt's current exercise: pushups, shld abd c 5-10#wts, machines for: inner/outer thigh//HS curl/rows  Pt advised to d/ c shld abd with that amount of wt to avoid further compression/irritation of any nerves in brachial plexus/neck. Pt can do  bicep curl if non-provoking, as well as PT HEP as noted today.  Access Code: [email or web address] ; URL: [email or web address]  Date: 01/22/2024 ; Prepared by: Zeina Flores  Exercises  - Sidelying Thoracic Rotation with Open Book  - 2 x daily - 10 reps - 5 hold  - Doorway Pec Stretch at 60 Elevation  - 2 x daily - 3 reps - 10 hold  - Standing Row with Anchored Resistance GTB  - 2 x daily - 20 reps - 5 hold    Assessment/Plan: Patient has shown excellent progress since starting PT. His pain has consistently resolved, and his intermittent N/T is now isolated to R DIPs 3-5, and over the past 5 days has improved to only be present in 5th DIP. His intermittent N/T often reduces or resolves during manual cervical traction and/or postural activities that improve axial elongation and scapular retraction. Pt to f/u later this week for HEP review c focus on postural strengthening and scapular strength, remeasurement, c planned transition to independent management after last visit if no flareups.      PLAN OF CARE:    Goals: (to be met in 8 visits)   Consistently decr pain < or = 1/10 intermittent for incr QOL and activity tolerance  Overall incr in function as indicated by decr NDI at least 10 pts  Painfree AROm c-spine and R 1st rib to allow improve neuromobility and decr symptoms  Improved postural awareness c incr axial elongation and scap retraction noted in therapy  Incr RUE MMT at least 1/2 grade painfree for incr mm support for functional activities and posture  Pt able to lift c proper ergonomics at least 20# to improve work tasks painfree  Indep HEP for progress toward functional goals    Frequency / Duration: Patient will be seen for 2 x/week or a total of 8 visits over a 90 day period.    All Treatments performed at distinct and separate times during the therapy session.  Treatment Minutes  Units charged   Manual Therapy 6 minutes    Therapeutic Activity 0 minutes    Neuromuscular Re-education 0 minutes    Therapeutic  Exercise 40 minutes 3   Total Direct Treatment Time 46 minutes

## 2024-02-14 ENCOUNTER — HOSPITAL ENCOUNTER (OUTPATIENT)
Age: 64
Setting detail: HOSPITAL OUTPATIENT SURGERY
Discharge: HOME | End: 2024-02-14
Attending: INTERNAL MEDICINE | Admitting: INTERNAL MEDICINE
Payer: MEDICAID

## 2024-02-14 ENCOUNTER — ANESTHESIA EVENT (OUTPATIENT)
Dept: ENDOSCOPY | Age: 64
End: 2024-02-14
Payer: MEDICAID

## 2024-02-14 ENCOUNTER — ANESTHESIA (OUTPATIENT)
Dept: ENDOSCOPY | Age: 64
End: 2024-02-14
Payer: MEDICAID

## 2024-02-14 ENCOUNTER — APPOINTMENT (OUTPATIENT)
Dept: PHYSICAL THERAPY | Age: 64
End: 2024-02-14
Attending: FAMILY MEDICINE
Payer: MEDICAID

## 2024-02-14 VITALS
RESPIRATION RATE: 19 BRPM | WEIGHT: 175 LBS | BODY MASS INDEX: 25.05 KG/M2 | SYSTOLIC BLOOD PRESSURE: 151 MMHG | HEIGHT: 70 IN | TEMPERATURE: 97 F | HEART RATE: 55 BPM | DIASTOLIC BLOOD PRESSURE: 85 MMHG | OXYGEN SATURATION: 100 %

## 2024-02-14 DIAGNOSIS — Z12.11 SCREEN FOR COLON CANCER: ICD-10-CM

## 2024-02-14 PROBLEM — K63.5 POLYP OF COLON: Status: ACTIVE | Noted: 2024-02-14

## 2024-02-14 PROBLEM — K57.30 DIVERTICULA OF COLON: Status: ACTIVE | Noted: 2024-02-14

## 2024-02-14 LAB — GLUCOSE BLDC GLUCOMTR-MCNC: 161 MG/DL (ref 70–99)

## 2024-02-14 PROCEDURE — 88305 TISSUE EXAM BY PATHOLOGIST: CPT | Performed by: INTERNAL MEDICINE

## 2024-02-14 PROCEDURE — 82962 GLUCOSE BLOOD TEST: CPT

## 2024-02-14 RX ORDER — SODIUM CHLORIDE, SODIUM LACTATE, POTASSIUM CHLORIDE, CALCIUM CHLORIDE 600; 310; 30; 20 MG/100ML; MG/100ML; MG/100ML; MG/100ML
INJECTION, SOLUTION INTRAVENOUS CONTINUOUS
Status: DISCONTINUED | OUTPATIENT
Start: 2024-02-14 | End: 2024-02-14

## 2024-02-14 RX ORDER — NALOXONE HYDROCHLORIDE 0.4 MG/ML
0.08 INJECTION, SOLUTION INTRAMUSCULAR; INTRAVENOUS; SUBCUTANEOUS ONCE AS NEEDED
Status: DISCONTINUED | OUTPATIENT
Start: 2024-02-14 | End: 2024-02-14

## 2024-02-14 RX ORDER — LIDOCAINE HYDROCHLORIDE 10 MG/ML
INJECTION, SOLUTION EPIDURAL; INFILTRATION; INTRACAUDAL; PERINEURAL AS NEEDED
Status: DISCONTINUED | OUTPATIENT
Start: 2024-02-14 | End: 2024-02-14 | Stop reason: SURG

## 2024-02-14 RX ADMIN — LIDOCAINE HYDROCHLORIDE 50 MG: 10 INJECTION, SOLUTION EPIDURAL; INFILTRATION; INTRACAUDAL; PERINEURAL at 14:05:00

## 2024-02-14 RX ADMIN — SODIUM CHLORIDE, SODIUM LACTATE, POTASSIUM CHLORIDE, CALCIUM CHLORIDE: 600; 310; 30; 20 INJECTION, SOLUTION INTRAVENOUS at 14:05:00

## 2024-02-14 NOTE — ANESTHESIA PREPROCEDURE EVALUATION
Anesthesia PreOp Note    HPI:     Chivo Miller is a 63 year old male who presents for preoperative consultation requested by: CHELSEA Stockton MD    Date of Surgery: 2/14/2024    Procedure(s):  COLONOSCOPY  Indication: Screen for colon cancer    Relevant Problems   No relevant active problems       NPO:  Last Liquid Consumption Date: 02/14/24  Last Liquid Consumption Time: 1100  Last Solid Consumption Date: 02/12/24  Last Solid Consumption Time: 2200  Last Liquid Consumption Date: 02/14/24          History Review:  Patient Active Problem List    Diagnosis Date Noted    Urine protein increased 01/31/2024    Cervical radiculopathy 09/20/2023    Mixed hyperlipidemia 06/13/2023    ED (erectile dysfunction) 07/15/2013    Vitamin D deficiency 06/29/2012    Type 2 diabetes mellitus with hyperglycemia, with long-term current use of insulin ([facility]) 06/29/2012    Allergic rhinitis, cause unspecified 06/29/2012    Unspecified hearing loss 06/29/2012       Past Medical History:   Diagnosis Date    Allergic rhinitis, cause unspecified 06/29/2012    Diabetes (HCC)     ED (erectile dysfunction) 07/15/2013    Pure hypercholesterolemia 07/29/2013    Type 2 diabetes mellitus with hyperglycemia, with long-term current use of insulin ([facility]) 06/29/2012    Unspecified hearing loss 06/29/2012    Vitamin D deficiency 06/29/2012       History reviewed. No pertinent surgical history.    Medications Prior to Admission   Medication Sig Dispense Refill Last Dose    empagliflozin (JARDIANCE) 10 MG Oral Tab Take 1 tablet (10 mg total) by mouth daily. 90 tablet 0 2/10/2024    metFORMIN  MG Oral Tablet 24 Hr 2 TAB DAILY IN am 180 tablet 1 2/10/2024    insulin glargine (LANTUS SOLOSTAR) 100 UNIT/ML Subcutaneous Solution Pen-injector Uses 25 units daily 15 mL 0 2/10/2024    glipiZIDE 10 MG Oral Tab Take 1 tablet (10 mg total) by mouth daily with breakfast. 90 tablet 0 2/10/2024    tamsulosin 0.4 MG Oral Cap Take 1 capsule (0.4 mg total) by  mouth daily. 90 capsule 3 2/10/2024    GABAPENTIN 100 MG Oral Cap TAKE 1 CAPSULE(100 MG) BY MOUTH THREE TIMES DAILY 90 capsule 0 2/10/2024    ATORVASTATIN 20 MG Oral Tab TAKE 1 TABLET(20 MG) BY MOUTH EVERY NIGHT 90 tablet 0 2/12/2024 at 0900    cyclobenzaprine 10 MG Oral Tab Take 1 tablet (10 mg total) by mouth nightly as needed.       ibuprofen 600 MG Oral Tab Take 1 tablet (600 mg total) by mouth every 6 (six) hours as needed for Pain.       dorzolamide-timolol 2-0.5 % Ophthalmic Solution        GAVILYTE-G 236 g Oral Recon Soln TAKE 4000ML BY MOUTH ONCE FOR 1 DOSE AS DIRECTED BY GI CLINIC INSTRUCTIONS (Patient not taking: Reported on 1/16/2024)       Insulin Pen Needle (PEN NEEDLES) 32G X 6 MM Does not apply Misc 1 each at bedtime. Use 1 pen needle with the Lantus every night 100 each 1     Alcohol Swabs 70 % Does not apply Pads Take 1 Bottle by mouth As Directed.       brimonidine 0.2 % Ophthalmic Solution Place 1 drop into the right eye in the morning, at noon, and at bedtime.       Glucose Blood (TRUE METRIX BLOOD GLUCOSE TEST) In Vitro Strip Test twice daily. 100 strip 5     Lancets Does not apply Misc Test twice daily 100 each 0     Glucose Blood (TRUETRACK TEST) In Vitro Strip TEST TWICE DAILY 200 each 1      Current Facility-Administered Medications Ordered in Epic   Medication Dose Route Frequency Provider Last Rate Last Admin    lactated ringers infusion   Intravenous Continuous CHELSEA Stockton MD         No current [facility]-ordered outpatient medications on file.       No Known Allergies    Family History   Problem Relation Age of Onset    Stroke Mother     Other (liver cancer) Father      Social History     Socioeconomic History    Marital status:    Tobacco Use    Smoking status: Never    Smokeless tobacco: Never   Vaping Use    Vaping Use: Never used   Substance and Sexual Activity    Alcohol use: Yes     Alcohol/week: 0.0 standard drinks of alcohol     Comment: occa    Drug use: Yes     Types:  Cannabis     Comment: occ    Sexual activity: Yes   Other Topics Concern     Service No    Blood Transfusions No    Caffeine Concern Yes     Comment: 1 cup daily    Occupational Exposure No    Hobby Hazards No    Sleep Concern No    Stress Concern No    Weight Concern No    Special Diet No    Back Care No    Exercise Yes     Comment: PT excersises at home    Bike Helmet No    Seat Belt No    Self-Exams No       Available pre-op labs reviewed.             Vital Signs:  Body mass index is 25.11 kg/m².   height is 1.778 m (5' 10\") and weight is 79.4 kg (175 lb). His blood pressure is 146/69 and his pulse is 57. His respiration is 15 and oxygen saturation is 100%.   Vitals:    02/02/24 0858 02/14/24 1355   BP:  146/69   Pulse:  57   Resp:  15   SpO2:  100%   Weight: 79.4 kg (175 lb)    Height: 1.778 m (5' 10\")         Anesthesia Evaluation     Patient summary reviewed and Nursing notes reviewed    Airway   Mallampati: III  TM distance: >3 FB  Neck ROM: full  Dental - Dentition appears grossly intact     Pulmonary - negative ROS and normal exam   Cardiovascular - negative ROS and normal exam    Neuro/Psych    (+)  neuromuscular disease,        GI/Hepatic/Renal - negative ROS     Endo/Other    (+) diabetes mellitus type 2  Abdominal  - normal exam                 Anesthesia Plan:   ASA:  2  Plan:   General  Informed Consent Plan and Risks Discussed With:  Patient  Discussed plan with:  Surgeon      I have informed Chivo Miller and/or legal guardian or family member of the nature of the anesthetic plan, benefits, risks including possible dental damage if relevant, major complications, and any alternative forms of anesthetic management.   All of the patient's questions were answered to the best of my ability. The patient desires the anesthetic management as planned.  VENUS GHOSH MD  2/14/2024 2:02 PM  Present on Admission:  **None**

## 2024-02-14 NOTE — OPERATIVE REPORT
COLONOSCOPY REPORT    Chivo Miller     6/15/1960 Age 63 year old   PCP Wendi Arechiga DO Endoscopist Adis Stockton MD     Date of procedure: 24    Procedure: Colonoscopy w/cold biopsy    Pre-operative diagnosis: Screening    Post-operative diagnosis: Polyp(s) of colon, diverticulosis of the colon, internal hemorrhoids    Medications: MAC    Withdrawal time: 15 minutes    Procedure:  Informed consent was obtained from the patient after the risks of the procedure were discussed, including but not limited to bleeding, perforation, aspiration, infection, or possibility of a missed lesion. After discussions of the risks/benefits and alternatives to this procedure, as well as the planned sedation, the patient was placed in the left lateral decubitus position and begun on continuous blood pressure pulse oximetry and EKG monitoring and this was maintained throughout the procedure. Once an adequate level of sedation was obtained a digital rectal exam was completed. Then the lubricated tip of the [id number] diagnostic video colonoscope was inserted and advanced without difficulty to the cecum using the CO2 insufflation technique. The cecum was identified by localizing the trifold, the appendix and the ileocecal valve. Withdrawal was begun with thorough washing and careful examination of the colonic walls and folds. A routine second examination of the cecum/ascending colon was performed. Photodocumentation was obtained. The bowel prep was good. Views of the colon were good with washing. I then carefully withdrew the instrument from the patient who tolerated the procedure well.     Complications: none.    Findings:   1. Two polyp(s) noted as follows:      A. 2 mm polyp in the cecum of the colon; diminutive morphology; cold biopsy polypectomy and retrieved.      B. 2 mm polyp in the cecum of the colon; diminutive morphology; cold biopsy polypectomy and retrieved.    2. Diverticulosis: mild in the  ascending.    3. Terminal ileum: the visualized mucosa appeared normal.    4. The colonic mucosa throughout the colon showed normal vascular pattern, without evidence of angioectasias or inflammation.     5. A retroflexed view of the rectum revealed small internal hemorrhoids.    6. UMA: normal rectal tone, no masses palpated.     Impression:   Two small colon polyps removed.  Diverticulosis, isolated to ascending colon.  Small internal hemorrhoids.    Recommend:  Await pathology. The interval for the next colonoscopy will be determined after reviewing pathology. If new signs or symptoms develop, colonoscopy may need to be repeated sooner.   High fiber diet.  Monitor for blood in the stool. If having more than just tinge of blood, call office or go to the ER.  Recommend enhanced bowel prep for next colonoscopy (3 days of dulcolax + bowel prep). Patient should be reminded NOT to consume seeds, nuts, corn, popcorn or raw fruits/vegetables for 5 days prior to next colonoscopy.     >>>If tissue was obtained and you have not received your pathology results either by phone or letter within 2 weeks, please call our office at [phone].    Specimens: colon  Blood loss: <1 ml      ----------------------------------------------------------------------------------------------------------------------------------    INTERVAL FOR COLONOSCOPY:   - If there is no family history of colon cancer and no colon polyps identified in an adequately prepped colon - colonoscopy should be repeated in 10 years. Various factors should be considered regarding repeat colonoscopy - including co-morbid conditions, ability to tolerate procedure with advanced age, and desire for repeat testing.   - If no colon polyps were identified and a positive family history of colon cancer - the colonoscopy should be repeated in 5 years.   - If colon polyps were removed, the colonoscopy should be repeated sooner depending on size/type/location of polyp.

## 2024-02-14 NOTE — H&P
History & Physical Examination    Patient Name: Chivo Miller  MRN: [id number]  CSN: [id number]  YOB: 1960    Diagnosis: screening for colon cancer    Medications Prior to Admission   Medication Sig Dispense Refill Last Dose    empagliflozin (JARDIANCE) 10 MG Oral Tab Take 1 tablet (10 mg total) by mouth daily. 90 tablet 0 2/10/2024    metFORMIN  MG Oral Tablet 24 Hr 2 TAB DAILY IN am 180 tablet 1 2/10/2024    insulin glargine (LANTUS SOLOSTAR) 100 UNIT/ML Subcutaneous Solution Pen-injector Uses 25 units daily 15 mL 0 2/10/2024    glipiZIDE 10 MG Oral Tab Take 1 tablet (10 mg total) by mouth daily with breakfast. 90 tablet 0 2/10/2024    tamsulosin 0.4 MG Oral Cap Take 1 capsule (0.4 mg total) by mouth daily. 90 capsule 3 2/10/2024    GABAPENTIN 100 MG Oral Cap TAKE 1 CAPSULE(100 MG) BY MOUTH THREE TIMES DAILY 90 capsule 0 2/10/2024    ATORVASTATIN 20 MG Oral Tab TAKE 1 TABLET(20 MG) BY MOUTH EVERY NIGHT 90 tablet 0 2/12/2024 at 0900    cyclobenzaprine 10 MG Oral Tab Take 1 tablet (10 mg total) by mouth nightly as needed.       ibuprofen 600 MG Oral Tab Take 1 tablet (600 mg total) by mouth every 6 (six) hours as needed for Pain.       dorzolamide-timolol 2-0.5 % Ophthalmic Solution        GAVILYTE-G 236 g Oral Recon Soln TAKE 4000ML BY MOUTH ONCE FOR 1 DOSE AS DIRECTED BY GI CLINIC INSTRUCTIONS (Patient not taking: Reported on 1/16/2024)       Insulin Pen Needle (PEN NEEDLES) 32G X 6 MM Does not apply Misc 1 each at bedtime. Use 1 pen needle with the Lantus every night 100 each 1     Alcohol Swabs 70 % Does not apply Pads Take 1 Bottle by mouth As Directed.       brimonidine 0.2 % Ophthalmic Solution Place 1 drop into the right eye in the morning, at noon, and at bedtime.       Glucose Blood (TRUE METRIX BLOOD GLUCOSE TEST) In Vitro Strip Test twice daily. 100 strip 5     Lancets Does not apply Misc Test twice daily 100 each 0     Glucose Blood (TRUETRACK TEST) In Vitro Strip TEST TWICE  DAILY 200 each 1      Current Facility-Administered Medications   Medication Dose Route Frequency    lactated ringers infusion   Intravenous Continuous       Allergies: No Known Allergies    Past Medical History:   Diagnosis Date    Allergic rhinitis, cause unspecified 06/29/2012    Diabetes (HCC)     ED (erectile dysfunction) 07/15/2013    Pure hypercholesterolemia 07/29/2013    Type 2 diabetes mellitus with hyperglycemia, with long-term current use of insulin (HCC) 06/29/2012    Unspecified hearing loss 06/29/2012    Vitamin D deficiency 06/29/2012     History reviewed. No pertinent surgical history.  Family History   Problem Relation Age of Onset    Stroke Mother     Other (liver cancer) Father      Social History     Tobacco Use    Smoking status: Never    Smokeless tobacco: Never   Substance Use Topics    Alcohol use: Yes     Alcohol/week: 0.0 standard drinks of alcohol     Comment: occa       SYSTEM Check if Review is Normal Check if Physical Exam is Normal If not normal, please explain:   HEENT [X ] [ X]    NECK  [X ] [ X]    HEART [X ] [ X]    LUNGS [X ] [ X]    ABDOMEN [X ] [ X]    EXTREMITIES [X ] [ X]    OTHER        I have discussed the risks and benefits and alternatives of the procedure with the patient/family.  They understand and agree to proceed with plan of care.   I have reviewed the History and Physical done within the last 30 days.  Any changes noted above.    TIM Stockton MD  [facility] - Gastroenterology  2/14/2024  2:05 PM

## 2024-02-14 NOTE — ANESTHESIA POSTPROCEDURE EVALUATION
Patient: Chivo Miller    Procedure Summary       Date: 02/14/24 Room / Location: [facility] ENDOSCOPY 01 / [facility] ENDO    Anesthesia Start: 1403 Anesthesia Stop:     Procedure: COLONOSCOPY Diagnosis:       Screen for colon cancer      (diverticulosis, polyps, hemorrhoids)    Surgeons: CHELSEA Stockton MD Anesthesiologist: Sophia Ghosh MD    Anesthesia Type: general ASA Status: 2            Anesthesia Type: general    Vitals Value Taken Time   /84 02/14/24 1439   Temp 97.4 °F (36.3 °C) 02/14/24 1439   Pulse 57 02/14/24 1439   Resp 18 02/14/24 1439   SpO2 98 % 02/14/24 1439   Vitals shown include unfiled device data.    [facility] AN Post Evaluation:   Patient Evaluated in PACU  Patient Participation: complete - patient participated  Level of Consciousness: awake  Pain Management: adequate  Airway Patency:patent  Dental exam unchanged from preop  Yes    Cardiovascular Status: acceptable  Respiratory Status: acceptable  Postoperative Hydration acceptable      SOPHIA GHOSH MD  2/14/2024 2:40 PM

## 2024-02-14 NOTE — DISCHARGE INSTRUCTIONS
Home Care Instructions for Colonoscopy with Sedation    Diet:  - Resume your regular diet as tolerated unless otherwise instructed.  - Start with light meals to minimize bloating.  - Do not drink alcohol today.    Medication:  - If you have questions about resuming your normal medications, please contact your Primary Care Physician.    Activities:  - Take it easy today. Do not return to work today.  - Do not drive today.  - Do not operate any machinery today (including kitchen equipment).    Colonoscopy:  - You may notice some rectal \"spotting\" (a little blood on the toilet tissue) for a day or two after the exam. This is normal.  - If you experience any rectal bleeding (not spotting), persistent tenderness or sharp severe abdominal pains, oral temperature over 100 degrees Fahrenheit, light-headedness or dizziness, or any other problems, contact your doctor.    **If unable to reach your doctor, please go to the [facility] Emergency Room**    - Your referring physician will receive a full report of your examination.  - If you do not hear from your doctor's office within two weeks of your biopsy, please call them for your results.    You may be able to see your laboratory results in [facility] between 4 and 7 business days.  In some cases, your physician may not have viewed the results before they are released to [facility].  If you have questions regarding your results contact the physician who ordered the test/exam by phone or via [facility] by choosing \"Ask a Medical Question.\"

## 2024-02-16 ENCOUNTER — OFFICE VISIT (OUTPATIENT)
Dept: PHYSICAL THERAPY | Age: 64
End: 2024-02-16
Attending: FAMILY MEDICINE
Payer: MEDICAID

## 2024-02-16 PROBLEM — D12.6 TUBULAR ADENOMA OF COLON: Status: ACTIVE | Noted: 2024-02-16

## 2024-02-16 PROCEDURE — 97110 THERAPEUTIC EXERCISES: CPT

## 2024-02-16 NOTE — PROGRESS NOTES
Discharge Summary  Pt has attended 8 visits in Physical Therapy.     Diagnosis:   Cervical radiculopathy (M54.12)   Referring Provider: Wendi Arechiga DO Date of Evaluation:    1/22/2024    Precautions:  None Next MD visit:   none scheduled  Date of Surgery: n/a  Symptom onset: 6-6-23 after lifting a suitcase at work     Insurance Primary/Secondary: [place]/[facility] [employer]AN     Visit 8 of 8 02/16/2024   Date POC Expires: 3-22-24     Subjective: Patient states he feels about 70% back to normal function at this time, is only limited now with some work related tasks but is able to do everything at home he needs to now. Patient states he has returned to swimming, is able to wash the dishes and clean himself without neck pain during or afterwards at this time.   Pain:   0/10 Pain is not a current complaint  @eval: Chivo Miller is a 63 year old male who presents to therapy today with complaints of R neck and RUE pain since 'sprain' 6-6-23 when he lifted a suitcase at work. 'It felt like the arm fell out of the socket.' Pain was excruciating. Pain in R UT and R chest/clavicle and N/T that radiates down RUE to the fingers. When first onset, digits 2-3 were numb, now digits 3-5 are numb, comes and goes. He states he feels an 'electrical shock' in RUE whenever he moves the RUE, especially in the morning. Pt had 8 visits of PT last year ending 8-28-23, which pt states did not help. But slowly over time the pain has been decreasing in severity; he has been doing exercises at the gym x3/wk, but the pain persists at mild-moderate level. Pt is back sleeper, pain does not awaken him overnight anymore. Taking 500mg ibuprofen for pain control PRN, currently taking once about every other day. He gets relief c raising RUE overhead. Worse c cervical flexion. Pt is not currently working due to this injury; MD recommends he has more therapy first. Pt would normally work 16 hrs/day, 6 days a week as a . Pt is  R hand dominant. The patient had been seen by Dr. Gandhi of orthopedics on 12/4/2023 in follow-up of his cervical radiculopathy.  He has also seen Dr. Forman of the pain clinic in September 2023.   Pt describes pain level at best 3/10, at worst 4/10.   Current functional limitations include UA to lift heavy items required for work, pain c looking down, driving.   Grover describes prior level of function full and painfree. Pt goals include resolve pain/N/T, be able to move neck and lift s pain so can return to work.  Past medical history was reviewed with Chivo. Significant findings include DM, diabetic polyneuropathy, benign prostatic hyperplasia, mixed hyperlipidemia.  Pt denies diplopia, dysarthria, dysphasia, dizziness, drop attacks, bowel/bladder changes, saddle anesthesia, and ARCELIA LE N/T.    Objective:     Observation/Posture: B rounded shoulders  Neuro Screen: intact to light touch RUE, but diminished RUE vs LUE     Cervical AROM EVAL 01/22: (* denotes performed with pain)  Flexion: 50*  Extension: 60*  Sidebending: R 19; L 21* L UT  Rotation: R 63; L 60    Cervical AROM EVAL 02/16: (* denotes performed with pain)  Flexion: 57  Extension: 60*  Sidebending: RSB: 34 with pulling on LSB: 30   Rotation: R Rot: 67 L Rot: 63     Shoulder AROM: (* denotes performed with pain)  R shld flex/abd/ER behind head full and painfree. Mild discomfort R scapula c IR RUE behind back.     Palpation: TTP spinous processes C6-T1     Strength: (* denotes performed with pain)  @eval 01/22: 02/16 PN   Shoulder Flex: R 4+/5, L 5/5  Shoulder ABD (C5): R 4+/5, L 5/5  Biceps (C6): R 5/5, L 5/5  Triceps (C7): R 5/5, L 5/5  Mid trap: R 4*/5; L 4/5  Low trap: R 3+*/5; L 3+*/5 Shoulder Flex: R/L: 5/5  Shoulder ABD (C5): R/L: 5/5 R bicep*  Biceps (C6): R: 5/5* DIP L:5/5  Triceps (C7): R: 4/5* L: 5/5  Mid trap: 4+/5 B pain free  Low trap: 4/4 B       Flexibility:   Pec Major: decr B     Gait: pt ambulates on level ground with normal  mechanics    Imagin23 CERVICAL MRI   FINDINGS:  Developmental shortening of the cervical pedicles is present, which reduces the baseline caliber of the central spinal canal on a developmental basis.  There is straightening of the cervical spine, with loss of usually observed lordosis. However there is no kyphosis. This appearance can be due to any of the following, including in combination: Neck pain, muscle spasm, cervical strain, cervical degenerative disease, and injury.  No subluxation is seen.   Degenerative changes the spine are present as described below.   DECRIPTION OF INDIVIDUAL DISC LEVELS   The craniocervical junction appears without disruption, malalignment or other active abnormality. The C1-2 articulation appears without disruption, or other active abnormality.   C2-C3:  Normal   C3-C4:  Disc degeneration with mild ventral ridging of the thecal sac from disc osteophyte complex along the posterior disc margin. No sign of cord contact, central canal stenosis, foraminal stenosis or other compromise.   C4-C5:  Disc degeneration with ventral ridging of the thecal sac from disc osteophyte complex along the posterior disc margin.  Mild effacement of the thecal sac, no central canal or foraminal stenosis.   C5-C6:  Disc degeneration with ventral ridging of the thecal sac from disc osteophyte complex along the posterior disc margin.  Slightly greater degenerative change at this level, including visible narrowing of the joint space in the cephalocaudal dimension.  Effacement of the ventral thecal sac and slight contact with the ventral cord left of midline for example axial image 33 series 5 but no sign of cord compression or significant central canal stenosis.  No significant foraminal stenosis.   C6-C7:  Normal   C7-T1  Normal    CONCLUSION:  Relatively modest degenerative changes in the spine, without signs of central canal stenosis, foraminal stenosis, cord compression.  Chronic appearing  straightening of the cervical spine.  No subluxation, fracture, lesion.  Spinal cord normal signal.     Treatment:  (\"NP\" indicates Not Performed this date)  Manual Therapy:NP 02/16  SOR  Manual cervical distraction 5min    Neuromuscular Re-education:    Therapeutic Exercise: Eval 1-22-24 1-25-24 1-30-24 2-2-2024 2-5-2024 2-7-24 2-12-24 DC  2-16-24   UBE fwd/retro  2minea  2'ea 2'ea 2'ea 2'ea 2'ea 2'ea   Open book R,L 5\"x10 5\"x10ea  5\"x10ea 5\"x10ea 5\"x10ea 5\"x10ea  REASSESS   Supine Chin Tucks     x20 5\" H x20 5\" H 5\"x20 5\"x20 5\"x20   Added Ulnar Nerve Glide        x20   Hooklying alt shld flex/ext 1/2 FR  2min 2min 2min x20ea 2min 2min 2min   Hooklying snow angels  1min 1min 1min +1/2 FR  X20ea 1/2 FR 1min 1min 1min   Supine 'T' pec stretch 1/2 FR      2min 2min 2min   Prone mid trap  5\"x10 5\"x15 5\"x15  5\"x20 5\"x20    R 1st Rib Mob w Strap     10x10\" R 5\"x15  5\" x15   Doorway pec stretch 10\"x3 20\"x3  20\"x3 20\"x3 20\"x3      Seated (instead of prone) scap squeeze  5\"x10  5\"x10 5\"x10 5\"x20      B row  GTB 5\"x20  GTB 5\"x20   GTB   5\"x20       B shld ER vs TB in mirror   R5\"x20 R5\"x20        Single arm row R,L    13# 2x10ea   13# 2x10 13#2x10     BUE slide up wall c low-trap liftoff     3\"x10   3\"x10 3\"x10     Standing Sh Ext vs TB      R5\"x10   G5\"x20         HEP:    Review of pt's current exercise: pushups, shld abd c 5-10#wts, machines for: inner/outer thigh//HS curl/rows  Pt advised to d/ c shld abd with that amount of wt to avoid further compression/irritation of any nerves in brachial plexus/neck. Pt can do bicep curl if non-provoking, as well as PT HEP as noted today.  Access Code: [email or web address] ; URL: [email or web address]  Date: 01/22/2024 ; Prepared by: Zeina Flores  Exercises  - Sidelying Thoracic Rotation with Open Book  - 2 x daily - 10 reps - 5 hold  - Doorway Pec Stretch at 60 Elevation  - 2 x daily - 3 reps - 10 hold  - Standing Row with Anchored Resistance GTB  - 2 x daily - 20 reps - 5  hold    Access Code: [email or web address]  URL: [email or web address]  DC--Date: 02/16/2024  Prepared by: Katelyn Beavers    Exercises  - Standing Ulnar Nerve Glide  - 3 x daily - 7 x weekly - 1 sets - 20 reps  - Supine Chin Tuck  - 2 x daily - 7 x weekly - 2 sets - 10 reps - 5s hold  - First Rib Mobilization with Strap  - 2 x daily - 7 x weekly - 1 sets - 10 reps - 10s hold  - Single Arm Doorway Pec Stretch at 90 Degrees Abduction  - 2 x daily - 7 x weekly - 3 sets - 20s hold  - Doorway Pec Stretch at 90 Degrees Abduction  - 2 x daily - 7 x weekly - 4 sets - 20s hold  - Cervical Extension AROM with Strap  - 1 x daily - 7 x weekly - 1 sets - 10 reps - 10s hold    Assessment/Plan: Patient has demonstrated good and consistent effort as well as progress throughout care, and has met majority of POC goals at this time. Patient does have continued ulnar pain into right elbow and 5th digit, but was provided with final HEP to address this and all other remaining deficits. Patient is now able to perform all at home tasks daily, is planning to return to work as he has not been able to tolerate a full day since onset of symptoms. Patient would benefit from transition to self management with final HEP provided on this day to address remaining posterior cervical pain/tension, 1st rib hypomobility on R, and pectoral tissue mobility.    PLAN OF CARE:    Goals: (to be met in 8 visits)   Consistently decr pain < or = 1/10 intermittent for incr QOL and activity tolerance Met 02/16  Overall incr in function as indicated by decr NDI at least 10 pts Met 02/16  Painfree AROm c-spine and R 1st rib to allow improve neuromobility and decr symptoms Met ROM but still painful extension 02/16  Improved postural awareness c incr axial elongation and scap retraction noted in therapy Met 02/16  Incr RUE MMT at least 1/2 grade painfree for incr mm support for functional activities and posture Met 02/16  Pt able to lift c proper ergonomics at least 20# to  improve work tasks painfree NT 02/16  Indep HEP for progress toward functional goals Met 02/16     Frequency / Duration: Patient will be seen for 2 x/week or a total of 8 visits over a 90 day period.    All Treatments performed at distinct and separate times during the therapy session.  Treatment Minutes  Units charged   Manual Therapy 0 minutes    Therapeutic Activity 0 minutes    Neuromuscular Re-education 0 minutes    Therapeutic Exercise 46 minutes 3   Total Direct Treatment Time 46 minutes      Post Neck Disability Index Score  Post Score: 6 % (2/16/2024  2:06 PM)    16 % improvement

## 2024-02-19 ENCOUNTER — TELEPHONE (OUTPATIENT)
Dept: FAMILY MEDICINE CLINIC | Facility: CLINIC | Age: 64
End: 2024-02-19

## 2024-02-19 NOTE — TELEPHONE ENCOUNTER
Patient requesting a letter for his  to proceed to trial.    is requesting a letter from  stating he is not able to work due to injury. Patient has not worked from  6/6/2023 to present. Patient has completed PT on Friday.     Please advise     Please fax [phone]

## 2024-02-29 ENCOUNTER — TELEPHONE (OUTPATIENT)
Dept: GASTROENTEROLOGY | Facility: CLINIC | Age: 64
End: 2024-02-29

## 2024-02-29 NOTE — TELEPHONE ENCOUNTER
I mailed out colonoscopy results letter to pt  Updated health maintenance  Entered into 5 yr CLN recall  Recall colon in 5 years per. Colon done 2/14/2024      CHELSEA Stockton MD  [facility] Clinical Staff  GI staff: please place recall for colonoscopy in 5 years

## 2024-03-12 DIAGNOSIS — E78.2 MIXED HYPERLIPIDEMIA: ICD-10-CM

## 2024-03-12 RX ORDER — GABAPENTIN 100 MG/1
100 CAPSULE ORAL 3 TIMES DAILY
Qty: 90 CAPSULE | Refills: 0 | Status: SHIPPED | OUTPATIENT
Start: 2024-03-12

## 2024-03-12 NOTE — TELEPHONE ENCOUNTER
Medication: GABAPENTIN 100 MG Oral Cap     Date of last refill: 1/12/24    Last office visit: 9/20/23  Due back to clinic per last office note:  n/a  Date next office visit scheduled:  none        Last OV note recommendation: The patient is a right-handed male who presents today for evaluation of neck pain with radicular symptoms on the right side. This is mostly in the C7 distribution. Has numbness in the hands and fingers. Does have significant tricep weakness. Based on symptoms, highly suspicious for cervical radiculopathy. Patient has failed conservative management to date including 8 weeks of physical therapy within the last 3 months, nonsteroidals, and muscle relaxers. Discussed need for cervical MRI for injection planning purposes. Also prescribed steroid Dosepak.

## 2024-03-13 RX ORDER — ATORVASTATIN CALCIUM 20 MG/1
20 TABLET, FILM COATED ORAL NIGHTLY
Qty: 90 TABLET | Refills: 0 | Status: SHIPPED | OUTPATIENT
Start: 2024-03-13

## 2024-03-14 ENCOUNTER — OFFICE VISIT (OUTPATIENT)
Dept: FAMILY MEDICINE CLINIC | Facility: CLINIC | Age: 64
End: 2024-03-14
Payer: MEDICAID

## 2024-03-14 VITALS
WEIGHT: 179.38 LBS | BODY MASS INDEX: 25.68 KG/M2 | TEMPERATURE: 98 F | HEIGHT: 70 IN | HEART RATE: 71 BPM | OXYGEN SATURATION: 99 % | RESPIRATION RATE: 20 BRPM | DIASTOLIC BLOOD PRESSURE: 60 MMHG | SYSTOLIC BLOOD PRESSURE: 120 MMHG

## 2024-03-14 DIAGNOSIS — D12.6 TUBULAR ADENOMA OF COLON: ICD-10-CM

## 2024-03-14 DIAGNOSIS — Z79.4 TYPE 2 DIABETES MELLITUS WITH HYPERGLYCEMIA, WITH LONG-TERM CURRENT USE OF INSULIN (HCC): Primary | ICD-10-CM

## 2024-03-14 DIAGNOSIS — E11.65 TYPE 2 DIABETES MELLITUS WITH HYPERGLYCEMIA, WITH LONG-TERM CURRENT USE OF INSULIN (HCC): Primary | ICD-10-CM

## 2024-03-14 DIAGNOSIS — E78.2 MIXED HYPERLIPIDEMIA: ICD-10-CM

## 2024-03-14 DIAGNOSIS — M54.12 CERVICAL RADICULOPATHY: ICD-10-CM

## 2024-03-14 PROCEDURE — 99214 OFFICE O/P EST MOD 30 MIN: CPT | Performed by: FAMILY MEDICINE

## 2024-03-14 RX ORDER — INSULIN GLARGINE 100 [IU]/ML
INJECTION, SOLUTION SUBCUTANEOUS
COMMUNITY
Start: 2024-03-14

## 2024-03-14 NOTE — PROGRESS NOTES
The 21st Century Cures Act makes medical notes like these available to patients in the interest of transparency. Please be advised this is a medical document. Medical documents are intended to carry relevant information, facts as evident, and the clinical opinion of the practitioner. The medical note is intended as peer to peer communication and may appear blunt or direct. It is written in medical language and may contain abbreviations or verbiage that are unfamiliar.       Chivo Miller is a 63 year old male.    HPI:     Chief Complaint   Patient presents with    Follow - Up    Diabetes     Lipids  Cervical radiculopathy       This 63-year-old male presents to the office for follow-up on his diabetes, lipids, blood pressure and cervical radiculopathy.     The patient is very happy because his diabetes is finally getting controlled.  He has been checking his blood sugars at home and they have been running between 87 and 224.  He is only had 1 reading over 200. His blood sugars are consistently under 180 and postprandial and under 120 fasting.  He has been seen at the diabetic clinic who has adjusted his Lantus down to 18 units nightly because he was having some hypoglycemic symptoms.  Since the adjustment, the hypoglycemic symptoms have stopped.  She has also changed his metformin to 500 mg ER 2 tablets in the morning. He is still taking the glipizide 10 mg daily and the Jardiance 10 mg daily.  His diabetic eye exam was in November 2023.  He states he was seen by his eye doctor yesterday in follow-up of his glaucoma.  His medication was changed but he does not know the name of his new eyedrop.    The patient has completed his physical therapy for his cervical radiculopathy into the right arm.  He is now doing a home exercise program.  He states his symptoms have improved but he still has numbness into the right fourth and fifth fingers especially when he is flexing the elbow.  He had seen  at the pain  clinic and was recommended a possible steroid injection.  The patient has not yet scheduled that appointment.  The patient would like to return back to work but like to get his injection first.  He is not having any new symptoms in either arm or into the legs.  He had last seen  of orthopedics on 12/4/2023 who recommended conservative treatment with physical therapy and the patient has now completed it.    He had recent colonoscopy which showed tubular adenoma of the cecum.  He will be due for repeat colonoscopy in 5 years.    HISTORY:  Past Medical History:   Diagnosis Date    Allergic rhinitis, cause unspecified 06/29/2012    Colon polyps 2024    repeat CLN in 2029    Diabetes (HCC)     ED (erectile dysfunction) 07/15/2013    Pure hypercholesterolemia 07/29/2013    Type 2 diabetes mellitus with hyperglycemia, with long-term current use of insulin (HCC) 06/29/2012    Unspecified hearing loss 06/29/2012    Vitamin D deficiency 06/29/2012      Past Surgical History:   Procedure Laterality Date    COLONOSCOPY N/A 02/14/2024    Dr. Stockton; diverticulosis, polyps, hemorrhoids    COLONOSCOPY N/A 2/14/2024    Procedure: COLONOSCOPY;  Surgeon: CHELSEA Stockton MD;  Location: [facility]      Family History   Problem Relation Age of Onset    Stroke Mother     Other (liver cancer) Father       Social History:   Social History     Socioeconomic History    Marital status:    Tobacco Use    Smoking status: Never    Smokeless tobacco: Never   Vaping Use    Vaping Use: Never used   Substance and Sexual Activity    Alcohol use: Yes     Alcohol/week: 0.0 standard drinks of alcohol     Comment: occa    Drug use: Yes     Types: Cannabis     Comment: occ    Sexual activity: Yes   Other Topics Concern     Service No    Blood Transfusions No    Caffeine Concern Yes     Comment: 1 cup daily    Occupational Exposure No    Hobby Hazards No    Sleep Concern No    Stress Concern No    Weight Concern No    Special Diet No     Back Care No    Exercise Yes     Comment: PT excersises at home    Bike Helmet No    Seat Belt No    Self-Exams No        Medications (Active prior to today's visit):  Current Outpatient Medications   Medication Sig Dispense Refill    insulin glargine (LANTUS SOLOSTAR) 100 UNIT/ML Subcutaneous Solution Pen-injector Uses 18 units daily      ATORVASTATIN 20 MG Oral Tab TAKE 1 TABLET(20 MG) BY MOUTH EVERY NIGHT 90 tablet 0    gabapentin 100 MG Oral Cap Take 1 capsule (100 mg total) by mouth 3 (three) times daily. 90 capsule 0    empagliflozin (JARDIANCE) 10 MG Oral Tab Take 1 tablet (10 mg total) by mouth daily. 90 tablet 0    metFORMIN  MG Oral Tablet 24 Hr 2 TAB DAILY IN am 180 tablet 1    glipiZIDE 10 MG Oral Tab Take 1 tablet (10 mg total) by mouth daily with breakfast. 90 tablet 0    tamsulosin 0.4 MG Oral Cap Take 1 capsule (0.4 mg total) by mouth daily. 90 capsule 3    cyclobenzaprine 10 MG Oral Tab Take 1 tablet (10 mg total) by mouth nightly as needed.      ibuprofen 600 MG Oral Tab Take 1 tablet (600 mg total) by mouth every 6 (six) hours as needed for Pain.      Insulin Pen Needle (PEN NEEDLES) 32G X 6 MM Does not apply Misc 1 each at bedtime. Use 1 pen needle with the Lantus every night 100 each 1    Alcohol Swabs 70 % Does not apply Pads Take 1 Bottle by mouth As Directed.      brimonidine 0.2 % Ophthalmic Solution Place 1 drop into the right eye in the morning, at noon, and at bedtime.      Glucose Blood (TRUE METRIX BLOOD GLUCOSE TEST) In Vitro Strip Test twice daily. 100 strip 5    Lancets Does not apply Misc Test twice daily 100 each 0    Glucose Blood (TRUETRACK TEST) In Vitro Strip TEST TWICE DAILY 200 each 1       Allergies:  No Known Allergies    ROS:     Pertinent positives and pertinent negatives are as listed in HPI.    All other review of symptoms were reviewed and negative.    PHYSICAL EXAM:   /60 (BP Location: Left arm, Patient Position: Sitting)   Pulse 71   Temp 97.7 °F (36.5  °C)   Resp 20   Ht 5' 10\" (1.778 m)   Wt 179 lb 6.4 oz (81.4 kg)   SpO2 99%   BMI 25.74 kg/m²     Wt Readings from Last 3 Encounters:   03/14/24 179 lb 6.4 oz (81.4 kg)   02/02/24 175 lb (79.4 kg)   01/31/24 182 lb 3.2 oz (82.6 kg)       BP Readings from Last 3 Encounters:   03/14/24 120/60   02/14/24 151/85   01/31/24 120/62     His blood pressure has improved from his last visit in February.  His weight is down 3 pounds from 1/31/2024.    General: Overweight, well hydrated. No acute distress. No pallor.   HEENT: Normocephalic, atraumatic.  NORBERTO, EOMI, Sclera clear and non icteric bilaterally. TM's normal, nose without congestion, pharynx without redness or exudates. Moist mucous membranes.  Neck: Supple. No lymphadenopathy. No thyromegaly. No bruits noted.  Heart: RRR without S3 or S4 or murmur.  Lungs: Clear to auscultation bilaterally. No rales, rhonchi or wheezes. No tachypnea or retractions noted.  Abdomen: Soft, nontender, nondistended, normal bowel sounds. No organomegaly. No guarding, rigidity or rebound noted.  Extremities: No edema bilaterally.  Skin: Warm and dry.  Neuro: Alert and oriented x 3, normal gait.  Psych: Normal mood and affect.     ASSESSMENT/PLAN:   63 year old male with    LABS This Visit:    Results for orders placed or performed during the hospital encounter of 02/14/24   POCT Glucose    Collection Time: 02/14/24  1:54 PM   Result Value Ref Range    POC Glucose  161 (H) 70 - 99 mg/dL   Specimen to Pathology Tissue    Collection Time: 02/14/24  2:19 PM   Result Value Ref Range    Case Report       Surgical Pathology                                Case: [id number]                                  Authorizing Provider:  CHELSEA Stockton MD        Collected:           02/14/2024 02:19 PM          Ordering Location:     [facility]   Received:            02/14/2024 02:45 PM          Pathologist:           Lavern Pope MD                                                         Specimen:    Cecum, polyp x2                                                                            Final Diagnosis:         Cecal polyps x2:   Tubular adenoma x1.  Fragment of colonic mucosa with superficial hyperplastic changes x1.        Embedded Images      Clinical Information       Z12.11 Screen For Colon Cancer.         Gross Description       The specimen is submitted in formalin labeled “[place], cecal polyp x2” and consists of two fragments of pink-tan soft tissue measuring in aggregate 0.3 x 0.2 x 0.1 cm. The specimen is submitted entirely in cassette A1. (jq)    Lavern Pope M.D./[facility] [place]        Interpretation Benign          1. Type 2 diabetes mellitus with hyperglycemia, with long-term current use of insulin (HCC)    Diabetes:    Lab Results   Component Value Date    A1C 8.9 (A) 01/31/2024    A1C 9.5 (A) 01/16/2024    A1C 9.0 (A) 10/16/2023     (H) 11/16/2016     (H) 11/05/2015     (H) 09/02/2015     Lab Results   Component Value Date    CHOLEST 242 (H) 06/12/2023    CHOLEST 254 (H) 11/16/2016    CHOLEST 205 (H) 06/30/2015     Lab Results   Component Value Date    HDL 53 06/12/2023    HDL 56 11/16/2016    HDL 58 06/30/2015     Lab Results   Component Value Date     (H) 06/12/2023     (H) 11/16/2016     06/30/2015     Lab Results   Component Value Date    TRIG 168 (H) 06/12/2023    TRIG 144 11/16/2016    TRIG 88 06/30/2015     Lab Results   Component Value Date    AST 18 06/12/2023    AST 12 (L) 11/16/2016    AST 18 05/07/2015     Lab Results   Component Value Date    ALT 23 06/12/2023    ALT 32 11/16/2016    ALT 33 05/07/2015     Patient's blood sugars are slowly improving.  His urine microalbuminuria is now normal.  He should continue with his medications as prescribed.  He is to have his next diabetic eye exam in November.  He will follow-up in 3 months. Keep appointment with diabetic clinic on 5/8/23.    2. Mixed hyperlipidemia    Continue  Atorvastatin 20 mg once nightly. Due for repeat lipid panel in June.    3. Cervical radiculopathy    Symptoms are improving. Continue HEP. Contact Dr. Forman's office to schedule injection.    4. Tubular adenoma of cecum    Repeat colonoscopy in 5 years.    5. Health maintenance    Return in June for annual physical.    Other orders  - insulin glargine (LANTUS SOLOSTAR) 100 UNIT/ML Subcutaneous Solution Pen-injector; Uses 18 units daily         Health Maintenance:    Health Maintenance   Topic Date Due    Zoster Vaccines (2 of 2) 04/08/2023    Influenza Vaccine (1) 10/01/2023    Annual Physical  06/12/2024    Diabetes Care A1C  04/30/2024    Diabetes Care: GFR  06/12/2024    Diabetes Care Dilated Eye Exam  11/03/2024    Diabetes Care Foot Exam  01/16/2025    Diabetes Care: Microalb/Creat Ratio  01/31/2025    PSA  06/12/2025    Colorectal Cancer Screening  02/14/2029    DTaP,Tdap,and Td Vaccines (2 - Td or Tdap) 02/11/2033    Annual Depression Screening  Completed    Pneumococcal Vaccine: Birth to 64yrs  Completed         Meds This Visit:  Requested Prescriptions      No prescriptions requested or ordered in this encounter       Imaging & Referrals:  None     Patient understands plan and follow-up.  FU in 6/2024 for annual physical.    3/14/2024  Wendi Arechiga DO    Total time: 30 minutes including precharting, H&P, plan of care    This dictation was performed with a verbal recognition program (DRAGON) and it was checked for errors. It is possible that there are still dictated errors within this office note. If so, please bring any errors to my attention for an addendum. All efforts were made to ensure that this office note is accurate

## 2024-03-14 NOTE — PATIENT INSTRUCTIONS
Continue your medications as prescribed.    Get a home blood pressure monitor and check your blood pressure once a day 3 days a week. It should be consistently under 130/80. Call the office with your blood pressure readings in 4 weeks.    Schedule your injection with Dr. Forman.    See me in June for your annual physical.

## 2024-03-21 ENCOUNTER — OFFICE VISIT (OUTPATIENT)
Dept: PAIN CLINIC | Facility: CLINIC | Age: 64
End: 2024-03-21
Payer: MEDICAID

## 2024-03-21 VITALS
WEIGHT: 179 LBS | BODY MASS INDEX: 26 KG/M2 | SYSTOLIC BLOOD PRESSURE: 98 MMHG | HEART RATE: 68 BPM | OXYGEN SATURATION: 98 % | DIASTOLIC BLOOD PRESSURE: 54 MMHG

## 2024-03-21 DIAGNOSIS — M79.18 MYOFASCIAL PAIN: Primary | ICD-10-CM

## 2024-03-21 PROCEDURE — 99214 OFFICE O/P EST MOD 30 MIN: CPT | Performed by: PHYSICIAN ASSISTANT

## 2024-03-21 NOTE — PROGRESS NOTES
HPI:   Chivo Miller presents with complaints of bilateral neck pain.    The pain is described as moderate aching that is intermittent.  The patient’s activity level has remained the same since last visit.  The pain is worst unrelated to time of day.    Changes in condition/history since last visit: Patient is here today for follow-up, having been seen once previously on 9/20/2023.  At that time, was sent for MRI of the cervical spine, which was completed on 11/16/2023.  Here today to review.    Last procedure: n/a    Date: n/a    Percentage of relief experienced from the procedure: n/a%    Duration of the relief: n/a    The following activities will increase the patient’s pain:  ROM, driving    The following activities decrease the patient’s pain: changing position    Functional Assessment: Patient reports that they are able to complete all of their ADL's such as eating, bathing, using the toilet, dressing and getting up from a bed or a chair independently.    Current Medications:  Current Outpatient Medications   Medication Sig Dispense Refill    insulin glargine (LANTUS SOLOSTAR) 100 UNIT/ML Subcutaneous Solution Pen-injector Uses 18 units daily      ATORVASTATIN 20 MG Oral Tab TAKE 1 TABLET(20 MG) BY MOUTH EVERY NIGHT 90 tablet 0    gabapentin 100 MG Oral Cap Take 1 capsule (100 mg total) by mouth 3 (three) times daily. 90 capsule 0    empagliflozin (JARDIANCE) 10 MG Oral Tab Take 1 tablet (10 mg total) by mouth daily. 90 tablet 0    metFORMIN  MG Oral Tablet 24 Hr 2 TAB DAILY IN am 180 tablet 1    glipiZIDE 10 MG Oral Tab Take 1 tablet (10 mg total) by mouth daily with breakfast. 90 tablet 0    tamsulosin 0.4 MG Oral Cap Take 1 capsule (0.4 mg total) by mouth daily. 90 capsule 3    cyclobenzaprine 10 MG Oral Tab Take 1 tablet (10 mg total) by mouth nightly as needed.      ibuprofen 600 MG Oral Tab Take 1 tablet (600 mg total) by mouth every 6 (six) hours as needed for Pain.      Insulin Pen Needle  (PEN NEEDLES) 32G X 6 MM Does not apply Misc 1 each at bedtime. Use 1 pen needle with the Lantus every night 100 each 1    Alcohol Swabs 70 % Does not apply Pads Take 1 Bottle by mouth As Directed.      brimonidine 0.2 % Ophthalmic Solution Place 1 drop into the right eye in the morning, at noon, and at bedtime.      Glucose Blood (TRUE METRIX BLOOD GLUCOSE TEST) In Vitro Strip Test twice daily. 100 strip 5    Lancets Does not apply Misc Test twice daily 100 each 0    Glucose Blood (TRUETRACK TEST) In Vitro Strip TEST TWICE DAILY 200 each 1      Patient requires assistance with: No assistance required    Reviewed Patient History Dated: 9/20/23 no changes noted    Physical Exam:   BP 98/54   Pulse 68   Wt 179 lb (81.2 kg)   SpO2 98%   BMI 25.68 kg/m²   VAS Pain Score:  /10  General Appearance: Well developed, well nourished, normal build, independent body habitus, no apparent physical disabilities, well groomed    Neurological Exam: WNL-Orientation to time, place and person, normal mood & effect, normal concentration & attention span  Inspection: non-antalgic,  no acute distress  Pain to palpation bilateral inferior cervical paraspinal musculature, proximal trapezius musculature, and rhomboids, identically reproducing his pain  Radiology/Lab Test Reviewed: MRI C spine:  C2-C3:  Normal      C3-C4:  Disc degeneration with mild ventral ridging of the thecal sac from disc osteophyte complex along the posterior disc margin. No sign of cord contact, central canal stenosis, foraminal stenosis or other compromise.      C4-C5:  Disc degeneration with ventral ridging of the thecal sac from disc osteophyte complex along the posterior disc margin.  Mild effacement of the thecal sac, no central canal or foraminal stenosis.      C5-C6:  Disc degeneration with ventral ridging of the thecal sac from disc osteophyte complex along the posterior disc margin.  Slightly greater degenerative change at this level, including visible  narrowing of the joint space in the cephalocaudal dimension.  Effacement of the ventral thecal sac and slight contact with the ventral cord left of midline for example axial image 33 series 5 but no sign of cord compression or significant central canal stenosis.  No significant foraminal stenosis.      C6-C7:  Normal      C7-T1  Normal   Lab Results   Component Value Date    WBC 5.6 06/12/2023    WBC 6.5 11/05/2015    WBC 6.8 04/12/2012   No results found for: \"HEMOGLOBIN\"  Lab Results   Component Value Date    .0 06/12/2023    .0 11/05/2015    .0 04/12/2012       Do you have any known blood/bleeding disorders?  No  Does patient currently take blood thinners?   None  Does patient currently take any antibiotics?   No  Patient educated and verbalized understanding.  Medical Decision Making:   Diagnosis:    Encounter Diagnosis   Name Primary?    Myofascial pain Yes     Impression: Reviewed MRI with patient, which does show nonstenotic C5-6 disc protrusion, no neurologic compression.  Continues with nonradiating neck pain involving the inferior cervical paraspinal musculature, proximal traps, and rhomboids bilaterally.  Symptoms are identically reproduced to palpation of the aforementioned musculature with identifiable trigger points.  Did discuss options, to include additional therapy, HEP, acupuncture, or trigger point injections.  He does wish to get more aggressive, and to that end, did place an order for bilateral trigger point injections.  Risk and benefits reviewed in detail, and we will proceed at his earliest convenience pending insurance approval.      Plan: Patient to schedule the following injection: TPI Levels: B cervical paraspinal, trap, rhomboids, Procedure and risks were discussed with pt. including headache, bleeding, infection and potential nerve damage.  F/u 2-3 weeks.      No orders of the defined types were placed in this encounter.      Meds & Refills for this  Visit:  Requested Prescriptions      No prescriptions requested or ordered in this encounter       Imaging & Consults:  None    The patient indicates understanding of these issues and agrees to the plan.    SATNAM Ga

## 2024-03-21 NOTE — PATIENT INSTRUCTIONS
Refill policies:    Allow 2-3 business days for refills; controlled substances may take longer.  Contact your pharmacy at least 5 days prior to running out of medication and have them send an electronic request or submit request through the “request refill” option in your [email or web address] account.  Refills are not addressed on weekends; covering physicians do not authorize routine medications on weekends.  No narcotics or controlled substances are refilled after noon on Fridays or by on call physicians.  By law, narcotics must be electronically prescribed.  A 30 day supply with no refills is the maximum allowed.  If your prescription is due for a refill, you may be due for a follow up appointment.  To best provide you care, patients receiving routine medications need to be seen at least once a year.  Patients receiving narcotic/controlled substance medications need to be seen at least once every 3 months.  In the event that your preferred pharmacy does not have the requested medication in stock (e.g. Backordered), it is your responsibility to find another pharmacy that has the requested medication available.  We will gladly send a new prescription to that pharmacy at your request.    Scheduling Tests:    If your physician has ordered radiology tests such as MRI or CT scans, please contact Central Scheduling at [phone] right away to schedule the test.  Once scheduled, the [facility] Centralized Referral Team will work with your insurance carrier to obtain pre-certification or prior authorization.  Depending on your insurance carrier, approval may take 3-10 days.  It is highly recommended patients assure they have received an authorization before having a test performed.  If test is done without insurance authorization, patient may be responsible for the entire amount billed.      Precertification and Prior Authorizations:  If your physician has recommended that you have a procedure or additional testing performed the [facility]  Centralized Referral Team will contact your insurance carrier to obtain pre-certification or prior authorization.    You are strongly encouraged to contact your insurance carrier to verify that your procedure/test has been approved and is a COVERED benefit.  Although the [facility] Centralized Referral Team does its due diligence, the insurance carrier gives the disclaimer that \"Although the procedure is authorized, this does not guarantee payment.\"    Ultimately the patient is responsible for payment.   Thank you for your understanding in this matter.  Paperwork Completion:  If you require FMLA or disability paperwork for your recovery, please make sure to either drop it off or have it faxed to our office at [phone]. Be sure the form has your name and date of birth on it.  The form will be faxed to our Forms Department and they will complete it for you.  There is a 25$ fee for all forms that need to be filled out.  Please be aware there is a 10-14 day turnaround time.  You will need to sign a release of information (ROBI) form if your paperwork does not come with one.  You may call the Forms Department with any questions at [phone].  Their fax number is [phone].

## 2024-03-21 NOTE — PROGRESS NOTES
Patient presents in office today with reported pain in cervical spine    Current pain level reported = 2/10    Last reported dose of ibuprofen today      Narcotic Contract renewal none    Urine Drug screen none

## 2024-03-25 ENCOUNTER — TELEPHONE (OUTPATIENT)
Dept: PAIN CLINIC | Facility: CLINIC | Age: 64
End: 2024-03-25

## 2024-03-25 NOTE — TELEPHONE ENCOUNTER
Prior authorization request completed for: bilateral cervical trapezius and rhomboid TPI  Authorization #no auth needed   Pre-D: no  Exclusions/Restrictions: n/a  Covered Benefit: yes  Authorization dates: n/a  CPT codes approved: 88857  Number of visits/dates of service approved: 1  Physician: ARGENTINA  Location: office  Call Ref#: [id number]  Representative Name: [facility]  Insurance Carrier: [facility] [phone]    Patient can be scheduled. Routed to Navigator.

## 2024-03-27 ENCOUNTER — MED REC SCAN ONLY (OUTPATIENT)
Dept: FAMILY MEDICINE CLINIC | Facility: CLINIC | Age: 64
End: 2024-03-27

## 2024-03-27 ENCOUNTER — TELEPHONE (OUTPATIENT)
Dept: FAMILY MEDICINE CLINIC | Facility: CLINIC | Age: 64
End: 2024-03-27

## 2024-03-27 NOTE — TELEPHONE ENCOUNTER
Received fax on 3/26/24 requesting medical records. Requesting medical records for DOS 6/6/23 Through Present.     [place] Claim Number: [id number]    Name: [employer]  Address: [address] [place] [address], [address], [place] [address]  PH: [phone]  Fax: [phone]    Original sent to Scan STAT, copy sent to medical records. Red Tag # [id number]

## 2024-04-03 ENCOUNTER — MED REC SCAN ONLY (OUTPATIENT)
Dept: FAMILY MEDICINE CLINIC | Facility: CLINIC | Age: 64
End: 2024-04-03

## 2024-04-03 ENCOUNTER — TELEPHONE (OUTPATIENT)
Dept: FAMILY MEDICINE CLINIC | Facility: CLINIC | Age: 64
End: 2024-04-03

## 2024-04-03 NOTE — TELEPHONE ENCOUNTER
Received fax on [employer] requesting medical records. Requesting medical records for any and all records.     Name: [employer] [place], [place], [place] [employer] KILL[employer].  Address: [address] [place] [address], [address], [place] [address]  Fax: [phone]    Original sent to Scan STAT, copy sent to medical records. Red Tag # [id number]

## 2024-04-08 ENCOUNTER — MED REC SCAN ONLY (OUTPATIENT)
Dept: FAMILY MEDICINE CLINIC | Facility: CLINIC | Age: 64
End: 2024-04-08

## 2024-04-08 ENCOUNTER — TELEPHONE (OUTPATIENT)
Dept: FAMILY MEDICINE CLINIC | Facility: CLINIC | Age: 64
End: 2024-04-08

## 2024-04-08 NOTE — TELEPHONE ENCOUNTER
Received fax on 4/5/2024 requesting medical records. Requesting medical records for 6/6/2023 to present.  Any and all medical records, including but not limited to , office notes, x-ray reports , tests results, operative reports, admission and discharge summaries, patient questioners, and itemized bills showing allowable charges under [place] compensation commission.      Name: [place] [employer]   Address: [address] [place] [address]  PH: [phone]  Fax: [phone]    Original sent to Scan STAT, copy sent to medical records. Red Tag # [id number]

## 2024-04-12 ENCOUNTER — NURSE ONLY (OUTPATIENT)
Dept: PAIN CLINIC | Facility: CLINIC | Age: 64
End: 2024-04-12
Payer: MEDICAID

## 2024-04-12 ENCOUNTER — OFFICE VISIT (OUTPATIENT)
Dept: PAIN CLINIC | Facility: CLINIC | Age: 64
End: 2024-04-12
Payer: MEDICAID

## 2024-04-12 VITALS
SYSTOLIC BLOOD PRESSURE: 112 MMHG | DIASTOLIC BLOOD PRESSURE: 68 MMHG | HEART RATE: 74 BPM | WEIGHT: 179 LBS | OXYGEN SATURATION: 98 % | BODY MASS INDEX: 26 KG/M2

## 2024-04-12 DIAGNOSIS — M79.18 MYOFASCIAL PAIN: Primary | ICD-10-CM

## 2024-04-12 RX ORDER — BUPIVACAINE HYDROCHLORIDE 5 MG/ML
18 INJECTION, SOLUTION EPIDURAL; INTRACAUDAL ONCE
Status: COMPLETED | OUTPATIENT
Start: 2024-04-12 | End: 2024-04-12

## 2024-04-12 RX ORDER — TRIAMCINOLONE ACETONIDE 40 MG/ML
80 INJECTION, SUSPENSION INTRA-ARTICULAR; INTRAMUSCULAR ONCE
Status: COMPLETED | OUTPATIENT
Start: 2024-04-12 | End: 2024-04-12

## 2024-04-12 NOTE — PATIENT INSTRUCTIONS
Refill policies:    Allow 2-3 business days for refills; controlled substances may take longer.  Contact your pharmacy at least 5 days prior to running out of medication and have them send an electronic request or submit request through the “request refill” option in your [employer] account.  Refills are not addressed on weekends; covering physicians do not authorize routine medications on weekends.  No narcotics or controlled substances are refilled after noon on Fridays or by on call physicians.  By law, narcotics must be electronically prescribed.  A 30 day supply with no refills is the maximum allowed.  If your prescription is due for a refill, you may be due for a follow up appointment.  To best provide you care, patients receiving routine medications need to be seen at least once a year.  Patients receiving narcotic/controlled substance medications need to be seen at least once every 3 months.  In the event that your preferred pharmacy does not have the requested medication in stock (e.g. Backordered), it is your responsibility to find another pharmacy that has the requested medication available.  We will gladly send a new prescription to that pharmacy at your request.    Scheduling Tests:    If your physician has ordered radiology tests such as MRI or CT scans, please contact Central Scheduling at [phone] right away to schedule the test.  Once scheduled, the [facility] Centralized Referral Team will work with your insurance carrier to obtain pre-certification or prior authorization.  Depending on your insurance carrier, approval may take 3-10 days.  It is highly recommended patients assure they have received an authorization before having a test performed.  If test is done without insurance authorization, patient may be responsible for the entire amount billed.      Precertification and Prior Authorizations:  If your physician has recommended that you have a procedure or additional testing performed the [facility]  Centralized Referral Team will contact your insurance carrier to obtain pre-certification or prior authorization.    You are strongly encouraged to contact your insurance carrier to verify that your procedure/test has been approved and is a COVERED benefit.  Although the [facility] Centralized Referral Team does its due diligence, the insurance carrier gives the disclaimer that \"Although the procedure is authorized, this does not guarantee payment.\"    Ultimately the patient is responsible for payment.   Thank you for your understanding in this matter.  Paperwork Completion:  If you require FMLA or disability paperwork for your recovery, please make sure to either drop it off or have it faxed to our office at [phone]. Be sure the form has your name and date of birth on it.  The form will be faxed to our Forms Department and they will complete it for you.  There is a 25$ fee for all forms that need to be filled out.  Please be aware there is a 10-14 day turnaround time.  You will need to sign a release of information (ROBI) form if your paperwork does not come with one.  You may call the Forms Department with any questions at [phone].  Their fax number is [phone].

## 2024-04-12 NOTE — PROCEDURES
Date of procedure: April 12, 2024    Preop diagnosis: Cervical myofascial pain    Postop diagnosis: Same    Procedure: Bilateral cervical TPI    Surgeon: Garcia Gil    Anesthesia: None    EBL: 0    Indication: Patient is a 63-year-old gentleman with a history of myofascial neck pain    Procedure detail: Informed consent obtained.  Timeout done.  The cervical spine was prepped in the usual sterile fashion.  Subsequently, a 27-gauge needle was used to deliver a total mixture of 80 mg triamcinolone with 18 cc of 0.5% bupivacaine into multiple trigger points.  Muscles injected include the levator scapula, trapezius, rhomboid.  Patient tolerated procedure well.    Garcia Forman MD

## 2024-04-12 NOTE — PROGRESS NOTES
Timeout completed prior to procedure @ 8103.  Participants present for timeout:  Dr. Forman, MOHSEN Jacobo, and patient.

## 2024-04-15 ENCOUNTER — TELEPHONE (OUTPATIENT)
Dept: PAIN CLINIC | Facility: CLINIC | Age: 64
End: 2024-04-15

## 2024-04-15 ENCOUNTER — MED REC SCAN ONLY (OUTPATIENT)
Dept: PAIN CLINIC | Facility: CLINIC | Age: 64
End: 2024-04-15

## 2024-04-15 NOTE — TELEPHONE ENCOUNTER
Received medical records request from [employer] for DOS 06/06/2023-Present. Original sent to [employer] copy sent to scanning.

## 2024-04-22 ENCOUNTER — TELEPHONE (OUTPATIENT)
Dept: FAMILY MEDICINE CLINIC | Facility: CLINIC | Age: 64
End: 2024-04-22

## 2024-04-22 NOTE — TELEPHONE ENCOUNTER
Patient calling with Blood pressure reading from today 112/68 and his sugar level when he woke up is 90. For Dr. Wendi Arechiga to review. SABRINA

## 2024-05-07 NOTE — PROGRESS NOTES
Chivo Miller is a 63 year old presenting today  for type 2 diabetes management.   Primary care physician: Wendi Arechiga DO  Initial consult with me for T2DM   Kuldip pro revealed hypogylycemia; lantus dose was decreased and jardiance started  Pt reports he has not filled jardiance since   No side effects - just fell off track with DM meds        Today's A1C 7.6 ( last A1C  8.9% )   BG today 207 mg/dl (non fasting) drank coffee w ~ 3tbsp condensed milk   Testing BG :  fasting and before bed  Fasting 86- 127  mg/dl   Before bed 160-167 mg/dl       Works as ; long hours.     Transient Lower R groin pain; typically occurs with position change or ejaculation   No dysuria. Has discussed w PCP - pain is transient   Denies hernia or bulge in area           Diabetes History:  Type 2 DM ~   Patient has not had hospitalizations for blood sugar issues  denies any history of pancreatitis      Previous DM therapies:  Tresiba/Levemir : insurance formulary     Current DM Regimen:  Jardiance 10mg once daily (off since 3-2024)   Lantus solostar 18  units once daily   Metformin ER 500m tab daily in AM    Glipizide 10mg once daily in AM       HGBA1C:    Lab Results   Component Value Date    A1C 7.6 (A) 2024    A1C 8.9 (A) 2024    A1C 9.5 (A) 2024     (H) 2016       Lab Results   Component Value Date    CHOLEST 242 (H) 2023    CHOLEST 254 (H) 2016    TRIG 168 (H) 2023    TRIG 144 2016    HDL 53 2023    HDL 56 2016     (H) 2023     (H) 2016     Lab Results   Component Value Date    MICROALBCREA 7.2 2024    MICROALBCREA 73.0 (H) 2023      Lab Results   Component Value Date    CREATSERUM 1.22 2023    CREATSERUM 1.39 (H) 2016    EGFRCR 67 2023     Lab Results   Component Value Date    AST 18 2023    AST 12 (L) 2016    ALT 23 2023    ALT 32 2016       Lab  Results   Component Value Date    TSH 1.720 06/12/2023    TSH 1.400 07/18/2013         DM Complications:  Microvascular:   Neuropathy: yes  Retinopathy: no  Nephropathy: yes    Macrovascular:  PVD: no  CAD: no  Stroke/CVA: no        Modifying factors:  Medication adherence: yes    Recent steroids, illness or infections ( past 3m): no     Allergies: Patient has no known allergies.    Past Medical History:    Allergic rhinitis, cause unspecified    Colon polyps    repeat CLN in 2029    Diabetes (HCC)    ED (erectile dysfunction)    Pure hypercholesterolemia    Type 2 diabetes mellitus with hyperglycemia, with long-term current use of insulin (HCC)    Unspecified hearing loss    Vitamin D deficiency     Past Surgical History:   Procedure Laterality Date    Colonoscopy N/A 02/14/2024    Dr. Stockton; diverticulosis, polyps, hemorrhoids    Colonoscopy N/A 2/14/2024    Procedure: COLONOSCOPY;  Surgeon: CHELSEA Stockton MD;  Location: [facility]     Social History     Socioeconomic History    Marital status:    Tobacco Use    Smoking status: Never    Smokeless tobacco: Never   Vaping Use    Vaping status: Never Used   Substance and Sexual Activity    Alcohol use: Yes     Alcohol/week: 0.0 standard drinks of alcohol     Comment: occa    Drug use: Yes     Types: Cannabis     Comment: occ    Sexual activity: Yes   Other Topics Concern     Service No    Blood Transfusions No    Caffeine Concern Yes     Comment: 1 cup daily    Occupational Exposure No    Hobby Hazards No    Sleep Concern No    Stress Concern No    Weight Concern No    Special Diet No    Back Care No    Exercise Yes     Comment: PT excersises at home    Bike Helmet No    Seat Belt No    Self-Exams No     Social Determinants of Health      [facility], [facility]    [facility]     Family History   Problem Relation Age of Onset    Stroke Mother     Other (liver cancer) Father      Current Medication List:   Current Outpatient  Medications   Medication Sig Dispense Refill    empagliflozin (JARDIANCE) 10 MG Oral Tab Take 1 tablet (10 mg total) by mouth daily. 90 tablet 1    Dulaglutide (TRULICITY) 0.75 MG/0.5ML Subcutaneous Solution Pen-injector Inject 0.75 mg into the skin once a week. 2 mL 3    glipiZIDE 10 MG Oral Tab Take 1 tablet (10 mg total) by mouth daily with breakfast. 90 tablet 1    metFORMIN  MG Oral Tablet 24 Hr 2 TAB DAILY IN am 180 tablet 1    insulin glargine (LANTUS SOLOSTAR) 100 UNIT/ML Subcutaneous Solution Pen-injector Uses 18 units daily      ATORVASTATIN 20 MG Oral Tab TAKE 1 TABLET(20 MG) BY MOUTH EVERY NIGHT 90 tablet 0    gabapentin 100 MG Oral Cap Take 1 capsule (100 mg total) by mouth 3 (three) times daily. 90 capsule 0    tamsulosin 0.4 MG Oral Cap Take 1 capsule (0.4 mg total) by mouth daily. 90 capsule 3    cyclobenzaprine 10 MG Oral Tab Take 1 tablet (10 mg total) by mouth nightly as needed.      ibuprofen 600 MG Oral Tab Take 1 tablet (600 mg total) by mouth every 6 (six) hours as needed for Pain. (Patient not taking: Reported on 5/8/2024)      Insulin Pen Needle (PEN NEEDLES) 32G X 6 MM Does not apply Misc 1 each at bedtime. Use 1 pen needle with the Lantus every night 100 each 1    Alcohol Swabs 70 % Does not apply Pads Take 1 Bottle by mouth As Directed.      brimonidine 0.2 % Ophthalmic Solution Place 1 drop into the right eye in the morning, at noon, and at bedtime.      Glucose Blood (TRUE METRIX BLOOD GLUCOSE TEST) In Vitro Strip Test twice daily. 100 strip 5    Lancets Does not apply Misc Test twice daily 100 each 0    Glucose Blood (TRUETRACK TEST) In Vitro Strip TEST TWICE DAILY 200 each 1           DM associated review of  symptoms:   Endocrine: Polyuria, polyphagia, polydipsia: no  Neurological: Paresthesias: yes LE , + ED   HEENT: Blurred vision: no  Skin: no rash or wounds  Hematological: Hypoglycemia: No       Review of Systems     LUNGS: denies shortness of breath   CARDIOVASCULAR:  denies chest pain  GI: denies abdominal pain, nausea or diarrhea   : denies dysuria  R lower groin pain         Physical exam:  /60   Pulse 69   Resp 16   Wt 178 lb 6.4 oz (80.9 kg)   SpO2 97%   BMI 25.60 kg/m²   Body mass index is 25.6 kg/m².    Physical Exam   Vitals reviewed.  Constitutional: Normal appearance   Cardiovascular: Normal rate , rhythm   Pulmonary/Chest: Effort normal  Neurological: Alert and oriented .   Psychiatric: Normal mood and affect.       Assessment/Plan:      Dyslipidemia:   Last  labs done 6-2023   Update lab ordered  Continue atorvastatin          Type 2 diabetes mellitus with hyperglycemia, with long-term current use of insulin ([facility])    A1C: 7.6% ( last A1C 8.9%)   Weight: 178 lb ( last weight  182 lb )     Diabetes control is improving but still sub optimal .  Reminded patient health impact associated with high glucose trends and the importance of better glucose control to prevent onset /progression of DM complications.   Reviewed the mechanism of action with GLP-1 Atul- and how they target 6 of the 8 deficits associated  w T2DM     Enhancing insulin secretion in glucose dependent state (no risk of hypoglycemia)   2/3. Inhibit glucagon secretion which results in reduction in hepatic glucose production   4. Corrects the gastrointestinal incretin defect   5. Exert a central effect on the brain to suppress appetite~ which promotes weight loss  6. Weight loss enhances both muscle and hepatic sensitivity to insulin     Reviewed action, risk vs benefit, dosing, and potential side effects of GLP-1 medications.  Patient denies hx of pancreatitis, gastroparesis, or personal or family hx of medullary thyroid CA or MEN 2. Agreeable to start therapy:   Start Trulicity: 0.75 mg once weekly subcutaneous     Decrease Lantus solostar: 18 --> 10 units once daily   Restart Jardiance 10mg once daily     Patient was instructed to drink at least 4 eight ounce glasses of water daily to avoid  dehydration.  Call if any rash, itching in genital area or painful urination develops   Sick day management discussed     Continue    Metformin XR 500mg  2 tab daily   Glipizide 10mg once daily        Reviewed  clinical significance of A1c, adverse effects of suboptimal glucose control, and goals of therapy   Reviewed the A1C test, what the value reflects and the goal for the patient.   Reminded pt on A1C and blood sugar targets (Fasting < 130 and post prandial <180 ) and complications associated with hyperglycemia and uncontrolled DM (on AVS)   Recommended SMBG 2- x daily   Reviewed s/s and treatment of hypoglycemia (on AVS)   Continue with lifestyle modifications since they have positive impact on diabetes/blood sugars/health (portion control, physical activity, weight loss)   Reinforced timing and adherence with medication, self-monitoring of blood glucose and routine follow up    Will place Professional Kuldip continuous glucose sensor in 4 weeks to assess glucose trends w GLP1 rx start.   May be able to decrease glip/stop basal     The patient is asked to return after kuldip pro but  recommended to contact DM clinic sooner if questions or concerns.    The patient indicates understanding of these issues and agrees to the plan.      Orders Placed This Encounter    HEMOGLOBIN A1C     Order Specific Question:   Release to patient     Answer:   Immediate    ASSAY QUANTITATIVE, GLUCOSE     Order Specific Question:   Release to patient     Answer:   Immediate    Comp Metabolic Panel (14)     Standing Status:   Future     Standing Expiration Date:   5/8/2025    Lipid Panel     Standing Status:   Future     Standing Expiration Date:   5/8/2025    TSH W Reflex To Free T4     Standing Status:   Future     Standing Expiration Date:   5/8/2025     Order Specific Question:   Release to patient     Answer:   Immediate    empagliflozin (JARDIANCE) 10 MG Oral Tab     Sig: Take 1 tablet (10 mg total) by mouth daily.     Dispense:   90 tablet     Refill:  1    Dulaglutide (TRULICITY) 0.75 MG/0.5ML Subcutaneous Solution Pen-injector     Sig: Inject 0.75 mg into the skin once a week.     Dispense:  2 mL     Refill:  3    glipiZIDE 10 MG Oral Tab     Sig: Take 1 tablet (10 mg total) by mouth daily with breakfast.     Dispense:  90 tablet     Refill:  1    metFORMIN  MG Oral Tablet 24 Hr     Si TAB DAILY IN am     Dispense:  180 tablet     Refill:  1     DM quality  A1C/Blood pressure: as reported above   Nephropathy screenin No indication for ace /arb rx.   LIPID screenin  . atorvastatin rx.   Last dilated eye exam: Last Dilated Eye Exam: 23   Exam shows retinopathy? Eye Exam shows Diabetic Retinopathy?: No  Last diabetic foot exam: Last Foot Exam: 24      instructed patient on Trulicity pen w demo pen: GLP rx  pen use, dosing w with the GLP rx pen, , site selection for subcutaneous injections, rotation and subcutaneous  injection administration.  Verbalized understanding of above content.     Spent 40 min obtaining patient history, evaluating patient, reviewing blood glucose trends, discussing treatment options, lifestyle modifications and completing documentation -this time does not including sensor interpretation time if applicable  The risks and benefits of my recommendations, as well as other treatment options were discussed with the patient today. questions were also answered to the best of my knowledge.

## 2024-05-08 ENCOUNTER — OFFICE VISIT (OUTPATIENT)
Dept: ENDOCRINOLOGY CLINIC | Facility: CLINIC | Age: 64
End: 2024-05-08
Payer: MEDICAID

## 2024-05-08 VITALS
OXYGEN SATURATION: 97 % | DIASTOLIC BLOOD PRESSURE: 60 MMHG | RESPIRATION RATE: 16 BRPM | WEIGHT: 178.38 LBS | SYSTOLIC BLOOD PRESSURE: 110 MMHG | HEART RATE: 69 BPM | BODY MASS INDEX: 26 KG/M2

## 2024-05-08 DIAGNOSIS — E11.65 TYPE 2 DIABETES MELLITUS WITH HYPERGLYCEMIA, WITH LONG-TERM CURRENT USE OF INSULIN (HCC): Primary | ICD-10-CM

## 2024-05-08 DIAGNOSIS — Z79.4 TYPE 2 DIABETES MELLITUS WITH HYPERGLYCEMIA, WITH LONG-TERM CURRENT USE OF INSULIN (HCC): Primary | ICD-10-CM

## 2024-05-08 DIAGNOSIS — E78.5 DYSLIPIDEMIA: ICD-10-CM

## 2024-05-08 LAB
GLUCOSE BLOOD: 207
HEMOGLOBIN A1C: 7.6 % (ref 4.3–5.6)
TEST STRIP LOT #: NORMAL NUMERIC

## 2024-05-08 PROCEDURE — 83036 HEMOGLOBIN GLYCOSYLATED A1C: CPT | Performed by: NURSE PRACTITIONER

## 2024-05-08 PROCEDURE — 99215 OFFICE O/P EST HI 40 MIN: CPT | Performed by: NURSE PRACTITIONER

## 2024-05-08 PROCEDURE — 82947 ASSAY GLUCOSE BLOOD QUANT: CPT | Performed by: NURSE PRACTITIONER

## 2024-05-08 RX ORDER — METFORMIN HYDROCHLORIDE 500 MG/1
TABLET, EXTENDED RELEASE ORAL
Qty: 180 TABLET | Refills: 1 | Status: SHIPPED | OUTPATIENT
Start: 2024-05-08

## 2024-05-08 RX ORDER — DULAGLUTIDE 0.75 MG/.5ML
0.75 INJECTION, SOLUTION SUBCUTANEOUS WEEKLY
Qty: 2 ML | Refills: 3 | Status: SHIPPED | OUTPATIENT
Start: 2024-05-08

## 2024-05-08 RX ORDER — GLIPIZIDE 10 MG/1
10 TABLET ORAL
Qty: 90 TABLET | Refills: 1 | Status: SHIPPED | OUTPATIENT
Start: 2024-05-08

## 2024-05-08 NOTE — PATIENT INSTRUCTIONS
A1C 7.6%   Have your labs done within the month - make sure you are fasting about 8 hrs overnight     We will start Trulicity for your diabetes      Trulicity Is considered a Non insulin injectables (also called a \"gut hormone\" or GLP-1 receptor agonists).     This hormone is very good for diabetes and improving blood sugars without a risk of low blood sugars. It also has properties that is good for protecting the heart, brain and kidneys.           The  medicine  works in the following ways:   1.            By helping beta cells release more insulin when there is food in the stomach and intestines. The increased insulin lowers blood sugars.  2.            By stopping the liver from releasing sugar in to the blood when it is NOT needed.  3.            By slowing the movement of food through the stomach so that glucose (or sugar) enters the blood more slowly   4.            By making you feel full which helps decrease the amount of food that you eat.     Common side effects:   Nausea or diarrhea   These effects usually go away over time as your body gets used to the medicine     Here are some things that might help your nausea go away:     Eat small amounts of food instead of  large meals  Eat plain, bland non greasy foods such as bread, crackers and rice , non seasoned lean proteins such as chicken or fish   Drink plenty of fluids (sugar free)   Avoid foods and smells that might make you sick   Avoid greasy fried or sweet foods.   Avoid lying down after you eat. Wait at least 2 hours   Eat slowly and listen to your hunger       Start Trulicity 0.75mg once weekly     Decrease Lantus 10 units once daily      Restart Jardiance 10mg once daily     This medication will remove extra sugar out of blood into your urine.   This medication will not only help improve your blood sugars but have also shown to help protect the kidneys and heart.       It can be dosed anytime of day but morning is probably best time to take it due  to increase in urination effect.     Please drink at least 4 glasses of water daily to avoid dehydration       It does not cause low blood sugars (hypoglycemia) . If you develop any low blood sugars, we will need to adjust other medications    Please call me if you develop any symptoms of urinary tract infection (burning with urination) or yeast infection (new rash itching or burning in the genital area)      If you are ever sick and not keeping fluids down, please hold the Jardiance  until you are tolerating fluids again     Also when taking these medications avoid following a  very-low-carbohydrate or ketogenic diet  as this  could increase the production of ketones, particularly during times of illness, dehydration, and/or metabolic stress, leading to diabetes ketoacidosis      Continue      Metformin XR 500mg  2 tab daily   Glipizide 10mg once daily       After 3-4 weeks, come back to have justina pro placed to monitor your trends     Fluctuations in blood sugars are best detected by testing your sugar with your meter.   In order for me to determine any patterns in your blood sugars, you will need to test your blood sugar- 2 times daily     It would be best to change up the times of day that you are testing your sugar.   Always test before breakfast (fasting) and then alternate testing blood sugar 1-2 hours after your meals.     American Diabetes Association: blood sugar targets:     Fasting blood sugar (before breakfast) Target:    (ideally less than 110)  2 hours after eating less than 180 (ideally less than  150 )     Call for blood sugars less than  75 or greater than  200 more than 2 times in a week           Watch for low blood sugars: (less than 70 )    Treatment of Low Blood Glucose Action Plan  1. Check blood glucose to be sure that it is low. You cannot  always go by symptoms or how you feel. If in doubt, treat your low blood glucose anyway.  Rule of 15 :     2. Take 15 grams of carbohydrate  (carb). Here are some choices:    4 oz. regular fruit juice  3-4 glucose tablets  6 oz. regular soda   7-8 jelly beans    3. Recheck blood glucose after 10-15 minutes. If blood glucose is still low (less than 70 mg/dl) repeat the treatment (step 2).    4. If your next meal is more than one hour away, eat a small snack.    5. If you’re not sure what caused your low blood glucose, call your healthcare provider.    6. Always check your blood glucose before you drive

## 2024-05-20 ENCOUNTER — TELEPHONE (OUTPATIENT)
Dept: FAMILY MEDICINE CLINIC | Facility: CLINIC | Age: 64
End: 2024-05-20

## 2024-05-20 RX ORDER — BLOOD SUGAR DIAGNOSTIC
STRIP MISCELLANEOUS
Qty: 100 STRIP | Refills: 1 | Status: SHIPPED | OUTPATIENT
Start: 2024-05-20 | End: 2025-05-20

## 2024-05-20 NOTE — TELEPHONE ENCOUNTER
Patient requesting a letter to return to work on 5/20/2024.  The start date for the letter is 5/20/2024 that patient was not at work. Patient needs form to be completed by 5/20/2024 and sent to [employer]. Fax, [phone]      Informed patient to allow up to 24 to 48 Business hours for a call back with a status.          Chivo Miller requesting medication refill for one touch test stirps.    LOV: 3/14/2024   Last Refill Date: n/a   30 or 90 Day Supply:  Pharmacy Location: [id number] [address]  [address] [facility] [address], [phone], [phone] [[id number]]   Prescribed By: n/a      Informed patient to allow up to 24 to 48 Business hours for a call back from a nurse.

## 2024-05-29 ENCOUNTER — NURSE ONLY (OUTPATIENT)
Dept: ENDOCRINOLOGY CLINIC | Facility: CLINIC | Age: 64
End: 2024-05-29

## 2024-05-29 NOTE — PROGRESS NOTES
A continuous glucose sensor for 24 hour blood sugar monitoring was placed on patient today per [facility] order.   Serial NUMBER: [id number]   Exp date: (9/30/24)  - After cleansing skin with alcohol prep, Sensor (justina pro)was inserted on  left  Posterior upper arm area without difficulty. Small amount of skin prep was added around sensor tape after placement to help with sensor adhesive.    Area remains free of any bleeding or irritation or pain at site when patient left DM center.  Patient instructions:   Record daily food/drink intake and activity in log book  Call Diabetes center with any questions or concerns.   instructed to record food, exercise, insulin doses (if taking) on log provided

## 2024-06-05 ENCOUNTER — OFFICE VISIT (OUTPATIENT)
Dept: ENDOCRINOLOGY CLINIC | Facility: CLINIC | Age: 64
End: 2024-06-05
Payer: MEDICAID

## 2024-06-05 ENCOUNTER — NURSE ONLY (OUTPATIENT)
Dept: ENDOCRINOLOGY CLINIC | Facility: CLINIC | Age: 64
End: 2024-06-05
Payer: MEDICAID

## 2024-06-05 DIAGNOSIS — Z79.4 TYPE 2 DIABETES MELLITUS WITH HYPERGLYCEMIA, WITH LONG-TERM CURRENT USE OF INSULIN (HCC): Primary | ICD-10-CM

## 2024-06-05 DIAGNOSIS — E11.65 TYPE 2 DIABETES MELLITUS WITH HYPERGLYCEMIA, WITH LONG-TERM CURRENT USE OF INSULIN (HCC): Primary | ICD-10-CM

## 2024-06-05 DIAGNOSIS — E78.5 DYSLIPIDEMIA: ICD-10-CM

## 2024-06-05 PROCEDURE — 95250 CONT GLUC MNTR PHYS/QHP EQP: CPT | Performed by: NURSE PRACTITIONER

## 2024-06-05 PROCEDURE — 95251 CONT GLUC MNTR ANALYSIS I&R: CPT | Performed by: NURSE PRACTITIONER

## 2024-06-05 PROCEDURE — 99214 OFFICE O/P EST MOD 30 MIN: CPT | Performed by: NURSE PRACTITIONER

## 2024-06-05 RX ORDER — GLUCAGON INJECTION, SOLUTION 1 MG/.2ML
1 INJECTION, SOLUTION SUBCUTANEOUS ONCE AS NEEDED
Qty: 0.2 ML | Refills: 0 | Status: SHIPPED | OUTPATIENT
Start: 2024-06-05 | End: 2024-06-05

## 2024-06-05 NOTE — PROGRESS NOTES
Chivo Miller is a 63 year old presenting today  for type 2 diabetes management.   Primary care physician: Wendi Arechiga DO  Initial consult   Last DM appt 2024  Started trulicity - tolerating well. Denies any GI side effects (has had 3 doses)       Most recent A1C 7.6%   GMI on justina pro report: 7.1%  Did NOT decrease lantus as recommended when Trulicity was started     3% hypoglycemia on sensor       JUSTINA CGM Analysis of data: 2024    Sensor download: full report in media  Average glucose : 157 mg/dl       CV (coefficient of variation) : 33.6%   GMI (estimated A1C) 7.1 %    25% time above 180mg /dl      6% time above 250 mg/dl     66% time in target range:  mg/dl     2% time below 70mg/dl     1 %time below 54mg/dl      Findings:   1. Pattern of hypoglycemia: yes    2. Time in target: 65 % (ADA recommended goal > 70%)   3. 12mn postprandial elevation  4. Normal glucose variability      Works as ; long hours. Will eat late at night ; heavy carb w African cuisine           Diabetes History:  Type 2 DM ~   Patient has not had hospitalizations for blood sugar issues  denies any history of pancreatitis      Previous DM therapies:  Tresiba/Levemir : insurance formulary     Current DM Regimen:  Jardiance 10mg once daily   Lantus solostar 10  units once daily --> dosing 18 units   Metformin ER 500m tab daily in AM    Glipizide 10mg once daily in AM   Trulicity 0.75mg subcutaneous once weekly      HGBA1C:    Lab Results   Component Value Date    A1C 7.6 (A) 2024    A1C 8.9 (A) 2024    A1C 9.5 (A) 2024     (H) 2016       Lab Results   Component Value Date    CHOLEST 242 (H) 2023    CHOLEST 254 (H) 2016    TRIG 168 (H) 2023    TRIG 144 2016    HDL 53 2023    HDL 56 2016     (H) 2023     (H) 2016     Lab Results   Component Value Date    MICROALBCREA 7.2 2024     MICROALBCREA 73.0 (H) 06/12/2023      Lab Results   Component Value Date    CREATSERUM 1.22 06/12/2023    CREATSERUM 1.39 (H) 11/16/2016    EGFRCR 67 06/12/2023     Lab Results   Component Value Date    AST 18 06/12/2023    AST 12 (L) 11/16/2016    ALT 23 06/12/2023    ALT 32 11/16/2016       Lab Results   Component Value Date    TSH 1.720 06/12/2023    TSH 1.400 07/18/2013         DM Complications:  Microvascular:   Neuropathy: yes  Retinopathy: no  Nephropathy: yes    Macrovascular:  PVD: no  CAD: no  Stroke/CVA: no        Modifying factors:  Medication adherence: yes    Recent steroids, illness or infections ( past 3m): no     Allergies: Patient has no known allergies.    Past Medical History:    Allergic rhinitis, cause unspecified    Colon polyps    repeat CLN in 2029    Diabetes (HCC)    ED (erectile dysfunction)    Pure hypercholesterolemia    Type 2 diabetes mellitus with hyperglycemia, with long-term current use of insulin (HCC)    Unspecified hearing loss    Vitamin D deficiency     Past Surgical History:   Procedure Laterality Date    Colonoscopy N/A 02/14/2024    Dr. Stockton; diverticulosis, polyps, hemorrhoids    Colonoscopy N/A 2/14/2024    Procedure: COLONOSCOPY;  Surgeon: CHELSEA Stockton MD;  Location: [facility]     Social History     Socioeconomic History    Marital status:    Tobacco Use    Smoking status: Never    Smokeless tobacco: Never   Vaping Use    Vaping status: Never Used   Substance and Sexual Activity    Alcohol use: Yes     Alcohol/week: 0.0 standard drinks of alcohol     Comment: occa    Drug use: Yes     Types: Cannabis     Comment: occ    Sexual activity: Yes   Other Topics Concern     Service No    Blood Transfusions No    Caffeine Concern Yes     Comment: 1 cup daily    Occupational Exposure No    Hobby Hazards No    Sleep Concern No    Stress Concern No    Weight Concern No    Special Diet No    Back Care No    Exercise Yes     Comment: PT excersises at home    Bike  Helmet No    Seat Belt No    Self-Exams No     Social Determinants of Health      Received from [facility], [facility]     Family History   Problem Relation Age of Onset    Stroke Mother     Other (liver cancer) Father      Current Medication List:   Current Outpatient Medications   Medication Sig Dispense Refill    glucagon (GVOKE HYPOPEN 1-PACK) 1 MG/0.2ML Subcutaneous injection Inject 0.2 mL (1 mg total) into the skin once as needed for Low blood glucose. 0.2 mL 0    Glucose Blood (ONETOUCH VERIO) In Vitro Strip Use as directed once daily. 100 strip 1    empagliflozin (JARDIANCE) 10 MG Oral Tab Take 1 tablet (10 mg total) by mouth daily. 90 tablet 1    Dulaglutide (TRULICITY) 0.75 MG/0.5ML Subcutaneous Solution Pen-injector Inject 0.75 mg into the skin once a week. 2 mL 3    glipiZIDE 10 MG Oral Tab Take 1 tablet (10 mg total) by mouth daily with breakfast. 90 tablet 1    metFORMIN  MG Oral Tablet 24 Hr 2 TAB DAILY IN am 180 tablet 1    insulin glargine (LANTUS SOLOSTAR) 100 UNIT/ML Subcutaneous Solution Pen-injector Inject 10 Units into the skin nightly. Uses 10 units daily      ATORVASTATIN 20 MG Oral Tab TAKE 1 TABLET(20 MG) BY MOUTH EVERY NIGHT 90 tablet 0    gabapentin 100 MG Oral Cap Take 1 capsule (100 mg total) by mouth 3 (three) times daily. 90 capsule 0    tamsulosin 0.4 MG Oral Cap Take 1 capsule (0.4 mg total) by mouth daily. 90 capsule 3    cyclobenzaprine 10 MG Oral Tab Take 1 tablet (10 mg total) by mouth nightly as needed.      ibuprofen 600 MG Oral Tab Take 1 tablet (600 mg total) by mouth every 6 (six) hours as needed for Pain. (Patient not taking: Reported on 5/8/2024)      Insulin Pen Needle (PEN NEEDLES) 32G X 6 MM Does not apply Misc 1 each at bedtime. Use 1 pen needle with the Lantus every night 100 each 1    Alcohol Swabs 70 % Does not apply Pads Take 1 Bottle by mouth As Directed.      brimonidine 0.2 % Ophthalmic Solution Place 1 drop into the right eye in the  morning, at noon, and at bedtime.      Glucose Blood (TRUE METRIX BLOOD GLUCOSE TEST) In Vitro Strip Test twice daily. 100 strip 5    Lancets Does not apply Misc Test twice daily 100 each 0    Glucose Blood (TRUETRACK TEST) In Vitro Strip TEST TWICE DAILY 200 each 1           DM associated review of  symptoms:   Endocrine: Polyuria, polyphagia, polydipsia: no  Neurological: Paresthesias: yes LE , + ED   HEENT: Blurred vision: no  Skin: no rash or wounds  Hematological: Hypoglycemia: No       Review of Systems     LUNGS: denies shortness of breath   CARDIOVASCULAR: denies chest pain  GI: denies abdominal pain, nausea or diarrhea   : denies dysuria        Physical exam:  There were no vitals taken for this visit.  There is no height or weight on file to calculate BMI.    Physical Exam     Constitutional: Normal appearance      Pulmonary/Chest: Effort normal  Neurological: Alert and oriented .   Psychiatric: Normal mood and affect.       Assessment/Plan:      Dyslipidemia:   Last  labs done 6-2023   Update lab ordered- reminded to complete labs   Continue atorvastatin          Type 2 diabetes mellitus with hyperglycemia, with long-term current use of insulin ([facility])    A1C: 7.6% ( last A1C 8.9%)   GMI on Kuldip 7.%   Last DM center weight: 178 lb      Diabetes control is improving   Hypoglycemia on sensor - but did not decrease Lantus dose as recommended w GLP1 rx start     Decrease Lantus solostar: 18 --> 12 units once daily   Continue    Metformin XR 500mg  2 tab daily   Glipizide 10mg once daily    Jardiance 10mg once daily     Patient was instructed to drink at least 4 eight ounce glasses of water daily to avoid dehydration.  Call if any rash, itching in genital area or painful urination develops   Sick day management discussed     For hypoglycemia emergency, Glucagon rx: reviewed demo pen today          Reviewed  clinical significance of A1c, adverse effects of suboptimal glucose control, and goals of therapy    Reviewed the A1C test, what the value reflects and the goal for the patient.   Reminded pt on A1C and blood sugar targets (Fasting < 130 and post prandial <180 ) and complications associated with hyperglycemia and uncontrolled DM (on AVS)   Recommended SMBG 2- x daily   Reviewed s/s and treatment of hypoglycemia (on AVS)   Continue with lifestyle modifications since they have positive impact on diabetes/blood sugars/health (portion control, physical activity, weight loss)   Reinforced timing and adherence with medication, self-monitoring of blood glucose and routine follow up  Reviewed to eat lower carb if eating late at night -   The patient is asked to return in 3 m  but  recommended to contact DM clinic sooner if questions or concerns.    The patient indicates understanding of these issues and agrees to the plan.      Orders Placed This Encounter    72 Hour glucose monitoring    Interpretation code 72 hour glucose monitor    glucagon (GVOKE HYPOPEN 1-PACK) 1 MG/0.2ML Subcutaneous injection     Sig: Inject 0.2 mL (1 mg total) into the skin once as needed for Low blood glucose.     Dispense:  0.2 mL     Refill:  0     1mg gvoke needed     DM quality  A1C/Blood pressure: as reported above   Nephropathy screenin No indication for ace /arb rx.   LIPID screenin  . atorvastatin rx.   Last dilated eye exam: Last Dilated Eye Exam: 23   Exam shows retinopathy? Eye Exam shows Diabetic Retinopathy?: No  Last diabetic foot exam: Last Foot Exam: 24    The risks and benefits of my recommendations, as well as other treatment options were discussed with the patient today. questions were also answered to the best of my knowledge.

## 2024-06-05 NOTE — PATIENT INSTRUCTIONS
You are having low blood sugars -        Decrease Lantus to 12 units once daily   Continue;   Jardiance 10 mg once daily   Metformin XR 500mg 2 tab daily   Glipizide 10mg once daily in AM       Fluctuations in blood sugars are best detected by testing your sugar with your meter.   In order for me to determine any patterns in your blood sugars, you will need to test your blood sugar 2 times daily     It would be best to change up the times of day that you are testing your sugar.   Always test before breakfast (fasting) and then alternate testing blood sugar 1-2 hours after your meals.     American Diabetes Association: blood sugar targets:     Fasting blood sugar (before breakfast) Target:    (ideally less than 110)  2 hours after eating less than 180 (ideally less than  150 )     Call for blood sugars less than  75 or greater than  200 more than 2 times in a week           Watch for low blood sugars: (less than 70 )    Treatment of Low Blood Glucose Action Plan  1. Check blood glucose to be sure that it is low. You cannot  always go by symptoms or how you feel. If in doubt, treat your low blood glucose anyway.  Rule of 15 :     2. Take 15 grams of carbohydrate (carb). Here are some choices:    4 oz. regular fruit juice  3-4 glucose tablets  6 oz. regular soda   7-8 jelly beans    3. Recheck blood glucose after 10-15 minutes. If blood glucose is still low (less than 70 mg/dl) repeat the treatment (step 2).    4. If your next meal is more than one hour away, eat a small snack.    5. If you’re not sure what caused your low blood glucose, call your healthcare provider.    6. Always check your blood glucose before you drive     GLUCAGON:     You should always have back-up glucagon in case of emergency, this is given to treat a severe low blood sugar that is not responding to eating or drinking carbohydrate,   or if you are not awake enough to safely eat or swallow. I will prescribe this if you have none at home.        Your caregiver or family member should be taught when and how to use a glucagon kit.   Severe hypoglycemia  Patient Education: Glucagon Emergency Kit      When low blood sugar isn’t treated and you need someone to help you recover, it is considered a severe event.     Treating severe hypoglycemia  Glucagon is a hormone produced in the pancreas that stimulates your liver to release stored glucose into your bloodstream when your blood sugar levels are too low.   Glucagon is an emergency medicine that will raise your blood sugar if you are unable to eat or drink. It is an important part of being prepared if you have diabetes and take insulin or a medication that can cause hypoglycemia (low blood sugar).     Glucagon is available by prescription and is either injected or administered or puffed into the nostril.   The people you are in frequent contact with (for example, friends, family members and coworkers) should be instructed on how to give you glucagon to treat severe hypoglycemia.   If you are someone who is at risk for hypoglycemia, you should have a current (nonexpired) glucagon emergency kit on hand AND someone who knows where it is kept and how to use it.       When is the Glucagon Emergency Kit used?  Glucagon should be used to treat hypoglycemia if you are unconscious, unresponsive(meaning you cannot follow commands, even if you appear to be awake) or cannot swallow or keep any food/liquids down.    What is in the Glucagon Emergency Kit?  Your glucagon kit contains picture instructions on how to use it. Be sure to check the date on the kit periodically to make sure your kit has not .    Is glucagon dangerous?  Glucagon is a safe drug. In fact, everyone's body contains natural glucagon to help keep blood sugar level. You need a prescription to obtain glucagon, because it is an injectable emergency medication. There is no danger of overdose. However, it is for emergencies and should be used for  such.     What to expect after dosing:   In about 5 to 15 minutes, the person should respond and begin wesley ome out of hypoglycemia. Sometimes a person may experience nausea or may vomit after responding to glucagon. Although initially the blood sugar may spike to a high level, it is important that the patient eat some food containing carbohydrate as soon as you are able since the injectable glucose wears off - .    What else do I need to know?  If the person does not respond within 15 minutes, call 911 immediately. It is possible that the person may be in a coma from severe hyperglycemia (very high blood sugar),rather than hypoglycemia, in which case, glucagon will not help.  Notify your diabetes provider whenever you experience a low blood sugar that requires glucagon. So you  can discuss ways to prevent severe hypoglycemia in the future.  Get your prescription refilled immediately.

## 2024-06-18 DIAGNOSIS — E78.2 MIXED HYPERLIPIDEMIA: ICD-10-CM

## 2024-06-20 RX ORDER — ATORVASTATIN CALCIUM 20 MG/1
20 TABLET, FILM COATED ORAL NIGHTLY
Qty: 90 TABLET | Refills: 0 | Status: SHIPPED | OUTPATIENT
Start: 2024-06-20

## 2024-09-18 ENCOUNTER — TELEPHONE (OUTPATIENT)
Dept: ENDOCRINOLOGY CLINIC | Facility: CLINIC | Age: 64
End: 2024-09-18

## 2024-09-18 ENCOUNTER — OFFICE VISIT (OUTPATIENT)
Dept: ENDOCRINOLOGY CLINIC | Facility: CLINIC | Age: 64
End: 2024-09-18
Payer: MEDICAID

## 2024-09-18 VITALS
SYSTOLIC BLOOD PRESSURE: 118 MMHG | BODY MASS INDEX: 25 KG/M2 | DIASTOLIC BLOOD PRESSURE: 70 MMHG | HEART RATE: 72 BPM | OXYGEN SATURATION: 98 % | WEIGHT: 175.63 LBS | RESPIRATION RATE: 17 BRPM

## 2024-09-18 DIAGNOSIS — E78.5 DYSLIPIDEMIA: ICD-10-CM

## 2024-09-18 DIAGNOSIS — Z79.4 TYPE 2 DIABETES MELLITUS WITH HYPERGLYCEMIA, WITH LONG-TERM CURRENT USE OF INSULIN (HCC): Primary | ICD-10-CM

## 2024-09-18 DIAGNOSIS — E11.65 TYPE 2 DIABETES MELLITUS WITH HYPERGLYCEMIA, WITH LONG-TERM CURRENT USE OF INSULIN (HCC): Primary | ICD-10-CM

## 2024-09-18 LAB
GLUCOSE BLOOD: 224
HEMOGLOBIN A1C: 8.3 % (ref 4.3–5.6)
TEST STRIP LOT #: NORMAL NUMERIC

## 2024-09-18 PROCEDURE — 83036 HEMOGLOBIN GLYCOSYLATED A1C: CPT | Performed by: NURSE PRACTITIONER

## 2024-09-18 PROCEDURE — 82947 ASSAY GLUCOSE BLOOD QUANT: CPT | Performed by: NURSE PRACTITIONER

## 2024-09-18 PROCEDURE — 99215 OFFICE O/P EST HI 40 MIN: CPT | Performed by: NURSE PRACTITIONER

## 2024-09-18 RX ORDER — DULAGLUTIDE 0.75 MG/.5ML
0.75 INJECTION, SOLUTION SUBCUTANEOUS WEEKLY
Qty: 2 ML | Refills: 3 | Status: SHIPPED | OUTPATIENT
Start: 2024-09-18

## 2024-09-18 RX ORDER — METFORMIN HCL 500 MG
TABLET, EXTENDED RELEASE 24 HR ORAL
Qty: 180 TABLET | Refills: 1 | Status: SHIPPED | OUTPATIENT
Start: 2024-09-18

## 2024-09-18 RX ORDER — GLIPIZIDE 10 MG/1
10 TABLET ORAL
Qty: 90 TABLET | Refills: 1 | Status: SHIPPED | OUTPATIENT
Start: 2024-09-18

## 2024-09-18 RX ORDER — INSULIN GLARGINE 100 [IU]/ML
INJECTION, SOLUTION SUBCUTANEOUS
Qty: 6 ML | Refills: 2 | Status: SHIPPED | OUTPATIENT
Start: 2024-09-18

## 2024-09-18 NOTE — PROGRESS NOTES
Chivo Miller is a 64 year old presenting today  for type 2 diabetes management.   Primary care physician: Wendi Arechiga DO  Initial consult   Last DM appt     Has not yet updated labs - reminded today      Today's A1C 8.3% ( last A1C  7.6%)   BG today in office 224 mg/dl   Had coffee w small bit of brown sugar     Admits not taking Trulicity rx. Since last DM appt   Admits fasting BG trends are 130-160 mg/dl range   Later in day 170-180 mg/dl  Denies hypoglycemia    Also admits he was out of DM meds ~ 3 days recently due to delay in pharmacy            Works as ; long hours. Will eat late at night ; heavy carb w African cuisine           Diabetes History:  Type 2 DM ~   Patient has not had hospitalizations for blood sugar issues  denies any history of pancreatitis      Previous DM therapies:  Tresiba/Levemir : insurance formulary     Current DM Regimen:  Jardiance 10mg once daily   Lantus solostar 12  units once daily   Metformin ER 500m tab daily in AM    Glipizide 10mg once daily in AM   Trulicity 0.75mg subcutaneous once weekly(--> not dosing)       HGBA1C:    Lab Results   Component Value Date    A1C 8.3 (A) 2024    A1C 7.6 (A) 2024    A1C 8.9 (A) 2024     (H) 2016       Lab Results   Component Value Date    CHOLEST 242 (H) 2023    CHOLEST 254 (H) 2016    TRIG 168 (H) 2023    TRIG 144 2016    HDL 53 2023    HDL 56 2016     (H) 2023     (H) 2016     Lab Results   Component Value Date    MICROALBCREA 7.2 2024    MICROALBCREA 73.0 (H) 2023      Lab Results   Component Value Date    CREATSERUM 1.22 2023    CREATSERUM 1.39 (H) 2016    EGFRCR 67 2023     Lab Results   Component Value Date    AST 18 2023    AST 12 (L) 2016    ALT 23 2023    ALT 32 2016       Lab Results   Component Value Date    TSH 1.720 2023    TSH 1.400  07/18/2013         DM Complications:  Microvascular:   Neuropathy: yes  Retinopathy: no  Nephropathy: yes    Macrovascular:  PVD: no  CAD: no  Stroke/CVA: no        Modifying factors:  Medication adherence: no see above -   Recent steroids, illness or infections ( past 3m): no     Allergies: Patient has no known allergies.    Past Medical History:    Allergic rhinitis, cause unspecified    Colon polyps    repeat CLN in 2029    Diabetes (HCC)    ED (erectile dysfunction)    Pure hypercholesterolemia    Type 2 diabetes mellitus with hyperglycemia, with long-term current use of insulin (HCC)    Unspecified hearing loss    Vitamin D deficiency     Past Surgical History:   Procedure Laterality Date    Colonoscopy N/A 02/14/2024    Dr. Stockton; diverticulosis, polyps, hemorrhoids    Colonoscopy N/A 2/14/2024    Procedure: COLONOSCOPY;  Surgeon: CHELSEA Stockton MD;  Location: [facility]     Social History     Socioeconomic History    Marital status:    Tobacco Use    Smoking status: Never    Smokeless tobacco: Never   Vaping Use    Vaping status: Never Used   Substance and Sexual Activity    Alcohol use: Yes     Alcohol/week: 0.0 standard drinks of alcohol     Comment: occa    Drug use: Yes     Types: Cannabis     Comment: occ    Sexual activity: Yes   Other Topics Concern     Service No    Blood Transfusions No    Caffeine Concern Yes     Comment: 1 cup daily    Occupational Exposure No    Hobby Hazards No    Sleep Concern No    Stress Concern No    Weight Concern No    Special Diet No    Back Care No    Exercise Yes     Comment: PT excersises at home    Bike Helmet No    Seat Belt No    Self-Exams No     Social Determinants of Health      Received [facility]     Family History   Problem Relation Age of Onset    Stroke Mother     Other (liver cancer) Father      Current Medication List:   Current Outpatient Medications   Medication Sig Dispense Refill    Dulaglutide  (TRULICITY) 0.75 MG/0.5ML Subcutaneous Solution Pen-injector Inject 0.75 mg into the skin once a week. 2 mL 3    insulin glargine (LANTUS SOLOSTAR) 100 UNIT/ML Subcutaneous Solution Pen-injector Up to 15 units daily w dose titration 6 mL 2    metFORMIN  MG Oral Tablet 24 Hr 2 TAB DAILY IN am 180 tablet 1    empagliflozin (JARDIANCE) 10 MG Oral Tab Take 1 tablet (10 mg total) by mouth daily. 90 tablet 1    glipiZIDE 10 MG Oral Tab Take 1 tablet (10 mg total) by mouth daily with breakfast. 90 tablet 1    ATORVASTATIN 20 MG Oral Tab TAKE 1 TABLET(20 MG) BY MOUTH EVERY NIGHT 90 tablet 0    gabapentin 100 MG Oral Cap Take 1 capsule (100 mg total) by mouth 3 (three) times daily. 90 capsule 0    tamsulosin 0.4 MG Oral Cap Take 1 capsule (0.4 mg total) by mouth daily. 90 capsule 3    Glucose Blood (ONETOUCH VERIO) In Vitro Strip Use as directed once daily. 100 strip 1    cyclobenzaprine 10 MG Oral Tab Take 1 tablet (10 mg total) by mouth nightly as needed. (Patient not taking: Reported on 9/18/2024)      ibuprofen 600 MG Oral Tab Take 1 tablet (600 mg total) by mouth every 6 (six) hours as needed for Pain. (Patient not taking: Reported on 5/8/2024)      Insulin Pen Needle (PEN NEEDLES) 32G X 6 MM Does not apply Misc 1 each at bedtime. Use 1 pen needle with the Lantus every night 100 each 1    Alcohol Swabs 70 % Does not apply Pads Take 1 Bottle by mouth As Directed.      brimonidine 0.2 % Ophthalmic Solution Place 1 drop into the right eye in the morning, at noon, and at bedtime.      Lancets Does not apply Misc Test twice daily 100 each 0    Glucose Blood (TRUETRACK TEST) In Vitro Strip TEST TWICE DAILY 200 each 1           DM associated review of  symptoms:   Endocrine: Polyuria, polyphagia, polydipsia: no  Neurological: Paresthesias: yes LE , + ED   HEENT: Blurred vision: no  Skin: no rash or wounds  Hematological: Hypoglycemia: No       Review of Systems     LUNGS: denies shortness of breath   CARDIOVASCULAR:  denies chest pain  GI: denies abdominal pain, nausea or diarrhea   : denies dysuria        Physical exam:  /70   Pulse 72   Resp 17   Wt 175 lb 9.6 oz (79.7 kg)   SpO2 98%   BMI 25.20 kg/m²   Body mass index is 25.2 kg/m².      Physical Exam   Vitals reviewed.  Constitutional: Normal appearance   Cardiovascular: Normal rate   Pulmonary/Chest: Effort normal  Neurological: Alert and oriented .   Psychiatric: Normal mood and affect.            Assessment/Plan:      Dyslipidemia:   Last  labs done 6-2023   Update lab ordered- reminded to complete labs --> have done today   Continue atorvastatin          Type 2 diabetes mellitus with hyperglycemia, with long-term current use of insulin ([facility])    A1C: 8.3 % ( last A1C 7.6%)   Weight 175 lb ( last DM center weight: 178 lb  )     Diabetes control is increased.   Reminded  patient health impact associated with high glucose trends and the importance of better glucose control to prevent onset /progression of DM complications.     Reviewed GLP, Trulicity again   Reviewed the mechanism of action with GLP-1 Atul- and how they target 6 of the 8 deficits associated  w T2DM     Enhancing insulin secretion in glucose dependent state (no risk of hypoglycemia)   2/3. Inhibit glucagon secretion which results in reduction in hepatic glucose production   4. Corrects the gastrointestinal incretin defect   5. Exert a central effect on the brain to suppress appetite~ which promotes weight loss  6. Weight loss enhances both muscle and hepatic sensitivity to insulin     Reviewed action, risk vs benefit, dosing, and potential side effects of GLP-1 medications.  Patient denies hx of pancreatitis, gastroparesis, or personal or family hx of medullary thyroid CA or MEN 2. Agreeable to start therapy:   Trulicity: 0.75 mg once weekly subcutaneous   Decrease Lantus solostar:  10 units once daily   Continue    Metformin XR 500mg  2 tab daily   Glipizide 10mg once daily    Jardiance 10mg once  daily     Patient was instructed to drink at least 4 eight ounce glasses of water daily to avoid dehydration.  To be mindful of  any rash, itching in genital area or painful urination . Sick day management and avoidance of keto diets reviewed.            Reviewed  clinical significance of A1c, adverse effects of suboptimal glucose control, and goals of therapy   Reviewed the A1C test, what the value reflects and the goal for the patient.   Reminded pt on A1C and blood sugar targets (Fasting < 130 and post prandial <180 ) and complications associated with hyperglycemia and uncontrolled DM (on AVS)   Recommended SMBG 2- x daily   Reviewed s/s and treatment of hypoglycemia (on AVS)   For hypoglycemia emergency, Glucagon rx: GVOKE 1mg    Continue with lifestyle modifications since they have positive impact on diabetes/blood sugars/health (portion control, physical activity, weight loss)   Reinforced timing and adherence with medication, self-monitoring of blood glucose and routine follow up    The patient is asked to return in 2 m  but  recommended to contact DM clinic sooner if questions or concerns.    The patient indicates understanding of these issues and agrees to the plan.      Orders Placed This Encounter    ASSAY QUANTITATIVE, GLUCOSE     Order Specific Question:   Release to patient     Answer:   Immediate    POC Hemoglobin A1C     Order Specific Question:   Release to patient     Answer:   Immediate    Dulaglutide (TRULICITY) 0.75 MG/0.5ML Subcutaneous Solution Pen-injector     Sig: Inject 0.75 mg into the skin once a week.     Dispense:  2 mL     Refill:  3    insulin glargine (LANTUS SOLOSTAR) 100 UNIT/ML Subcutaneous Solution Pen-injector     Sig: Up to 15 units daily w dose titration     Dispense:  6 mL     Refill:  2    metFORMIN  MG Oral Tablet 24 Hr     Si TAB DAILY IN am     Dispense:  180 tablet     Refill:  1    empagliflozin (JARDIANCE) 10 MG Oral Tab     Sig: Take 1 tablet (10 mg  total) by mouth daily.     Dispense:  90 tablet     Refill:  1    glipiZIDE 10 MG Oral Tab     Sig: Take 1 tablet (10 mg total) by mouth daily with breakfast.     Dispense:  90 tablet     Refill:  1     DM quality  A1C/Blood pressure: as reported above   Nephropathy screenin No indication for ace /arb rx.   LIPID screenin  . atorvastatin rx.   Last dilated eye exam: Last Dilated Eye Exam: 23   Exam shows retinopathy? Eye Exam shows Diabetic Retinopathy?: No  Last diabetic foot exam: Last Foot Exam: 24      Reminded to f/u with ophthalmologist - hx glaucoma and laser tx       The risks and benefits of my recommendations, as well as other treatment options were discussed with the patient today. questions were also answered to the best of my knowledge.

## 2024-09-18 NOTE — TELEPHONE ENCOUNTER
[facility] for Diabetic to be faxed over to office left returning call number and a fax number

## 2024-09-18 NOTE — PATIENT INSTRUCTIONS
Your diabetes control is increased   Target A1C closer to 7.0% and below for your long term health   .  Start trulicity 0.75mg once weekly ( rx was sent to pharmacy)          Decrease the Lantus solostar: 10 units once daily       Continue    Metformin XR 500mg  2 tab daily   Glipizide 10mg once daily    Jardiance 10mg once daily         We may be able to stop insulin after you are on trulicity for a few weeks        Trulicity Is considered a Non insulin injectables (also called a \"gut hormone\" or GLP-1 receptor agonists).     This hormone is very good for diabetes and improving blood sugars without a risk of low blood sugars. It also has properties that is good for protecting the heart, brain and kidneys.            The  medicine  works in the following ways:   1.            By helping beta cells release more insulin when there is food in the stomach and intestines. The increased insulin lowers blood sugars.  2.            By stopping the liver from releasing sugar in to the blood when it is NOT needed.  3.            By slowing the movement of food through the stomach so that glucose (or sugar) enters the blood more slowly   4.            By making you feel full which helps decrease the amount of food that you eat.     Common side effects:   Nausea or diarrhea   These effects usually go away over time as your body gets used to the medicine     Here are some things that might help your nausea go away:     Eat small amounts of food instead of  large meals  Eat plain, bland non greasy foods such as bread, crackers and rice , non seasoned lean proteins such as chicken or fish   Drink plenty of fluids (sugar free)   Avoid foods and smells that might make you sick   Avoid greasy fried or sweet foods.   Avoid lying down after you eat. Wait at least 2 hours   Eat slowly and listen to your hunger       Fluctuations in blood sugars are best detected by testing your sugar with your meter.   In order for me to determine any  patterns in your blood sugars, you will need to test your blood sugar 1- 2 times daily     It would be best to change up the times of day that you are testing your sugar.   Always test before breakfast (fasting) and then alternate testing blood sugar 1-2 hours after your meals.     American Diabetes Association: blood sugar targets:     Fasting blood sugar (before breakfast) Target:    (ideally less than 110)  2 hours after eating less than 180 (ideally less than  150 )     Call for blood sugars less than  75 or greater than  200 more than 2 times in a week     If you are wearing the sensor, please look at your trends/averages    Recommendations:   GMI (estimated A1C ) target under  7%     Time in range (healthy blood sugar targets) : goal is over 70%   less than 70 : goal is less than 4%   Over 180 : goal is less than 20 %   Over 250: goal is  less than 5%       Watch for low blood sugars: (less than 70 )    Treatment of Low Blood Glucose Action Plan  1. Check blood glucose to be sure that it is low. You cannot  always go by symptoms or how you feel. If in doubt, treat your low blood glucose anyway.  Rule of 15 :     2. Take 15 grams of carbohydrate (carb). Here are some choices:    4 oz. regular fruit juice  3-4 glucose tablets  6 oz. regular soda   7-8 jelly beans    3. Recheck blood glucose after 10-15 minutes. If blood glucose is still low (less than 70 mg/dl) repeat the treatment (step 2).    4. If your next meal is more than one hour away, eat a small snack.    5. If you’re not sure what caused your low blood glucose, call your healthcare provider.    6. Always check your blood glucose before you drive       To treat a low, I recommend you carry with you easy, pre-portioned treatment for low blood sugars that are 15G of carbs:   - Children sized squeeze pouch applesauce (high fiber + carbs help prevent too high of a spike)  - Small children's sized juicebox- 15g carb --> 4oz juice box  - Glucose tablets  from [employer], you can find them near diabetes supplies --> Note, you will need to eat 3-4 tablets to get to 15g of carbs  - Children sized fruit snack pack- look for one with 15 grams of total carbohydrate    AVOID complex carbs, or foods that contain fats along with carbs (like chocolate) can slow the absorption of glucose and should NOT be used to treat an emergency low

## 2024-09-19 NOTE — TELEPHONE ENCOUNTER
Received fax for eye exam patient had mild non proliferative in both eyes     Abstracted exam date 6/12/2024

## 2024-09-30 ENCOUNTER — TELEPHONE (OUTPATIENT)
Dept: ENDOCRINOLOGY CLINIC | Facility: CLINIC | Age: 64
End: 2024-09-30

## 2024-09-30 ENCOUNTER — LAB ENCOUNTER (OUTPATIENT)
Dept: LAB | Age: 64
End: 2024-09-30
Attending: NURSE PRACTITIONER
Payer: MEDICAID

## 2024-09-30 DIAGNOSIS — Z79.4 TYPE 2 DIABETES MELLITUS WITH HYPERGLYCEMIA, WITH LONG-TERM CURRENT USE OF INSULIN (HCC): ICD-10-CM

## 2024-09-30 DIAGNOSIS — E11.65 TYPE 2 DIABETES MELLITUS WITH HYPERGLYCEMIA, WITH LONG-TERM CURRENT USE OF INSULIN (HCC): ICD-10-CM

## 2024-09-30 LAB
ALBUMIN SERPL-MCNC: 4.8 G/DL (ref 3.2–4.8)
ALBUMIN/GLOB SERPL: 1.5 {RATIO} (ref 1–2)
ALP LIVER SERPL-CCNC: 74 U/L
ALT SERPL-CCNC: 23 U/L
ANION GAP SERPL CALC-SCNC: 3 MMOL/L (ref 0–18)
AST SERPL-CCNC: 25 U/L (ref ?–34)
BILIRUB SERPL-MCNC: 0.6 MG/DL (ref 0.2–1.1)
BUN BLD-MCNC: 13 MG/DL (ref 9–23)
CALCIUM BLD-MCNC: 10.2 MG/DL (ref 8.7–10.4)
CHLORIDE SERPL-SCNC: 107 MMOL/L (ref 98–112)
CHOLEST SERPL-MCNC: 234 MG/DL (ref ?–200)
CO2 SERPL-SCNC: 27 MMOL/L (ref 21–32)
CREAT BLD-MCNC: 1.15 MG/DL
EGFRCR SERPLBLD CKD-EPI 2021: 71 ML/MIN/1.73M2 (ref 60–?)
FASTING PATIENT LIPID ANSWER: YES
FASTING STATUS PATIENT QL REPORTED: YES
GLOBULIN PLAS-MCNC: 3.2 G/DL (ref 2–3.5)
GLUCOSE BLD-MCNC: 87 MG/DL (ref 70–99)
HDLC SERPL-MCNC: 51 MG/DL (ref 40–59)
LDLC SERPL CALC-MCNC: 166 MG/DL (ref ?–100)
NONHDLC SERPL-MCNC: 183 MG/DL (ref ?–130)
OSMOLALITY SERPL CALC.SUM OF ELEC: 283 MOSM/KG (ref 275–295)
POTASSIUM SERPL-SCNC: 3.9 MMOL/L (ref 3.5–5.1)
PROT SERPL-MCNC: 8 G/DL (ref 5.7–8.2)
SODIUM SERPL-SCNC: 137 MMOL/L (ref 136–145)
TRIGL SERPL-MCNC: 94 MG/DL (ref 30–149)
TSI SER-ACNC: 1.54 MIU/ML (ref 0.55–4.78)
VLDLC SERPL CALC-MCNC: 18 MG/DL (ref 0–30)

## 2024-09-30 PROCEDURE — 80061 LIPID PANEL: CPT

## 2024-09-30 PROCEDURE — 84443 ASSAY THYROID STIM HORMONE: CPT

## 2024-09-30 PROCEDURE — 80053 COMPREHEN METABOLIC PANEL: CPT

## 2024-09-30 PROCEDURE — 36415 COLL VENOUS BLD VENIPUNCTURE: CPT

## 2024-09-30 NOTE — TELEPHONE ENCOUNTER
Call with patient, asked him to please complete labs at any [place] lab, no appointment needed and no need to fast.  Patient said he will have labs done.

## 2024-09-30 NOTE — TELEPHONE ENCOUNTER
Pt last appt  ; requested for pt to have labs done that day before leaving appt  Last DM /lipids 6-2023    Labs are still outstanding   Please call pt to remind him labs are needed and NO NEED to fast

## 2024-10-01 DIAGNOSIS — E78.2 MIXED HYPERLIPIDEMIA: ICD-10-CM

## 2024-10-01 RX ORDER — ATORVASTATIN CALCIUM 20 MG/1
20 TABLET, FILM COATED ORAL DAILY
Qty: 90 TABLET | Refills: 3 | Status: SHIPPED | OUTPATIENT
Start: 2024-10-01

## 2024-10-03 ENCOUNTER — TELEPHONE (OUTPATIENT)
Dept: FAMILY MEDICINE CLINIC | Facility: CLINIC | Age: 64
End: 2024-10-03

## 2024-10-03 NOTE — TELEPHONE ENCOUNTER
Called patient to scheduled annual wellness scheduled for  Future Appointments   Date Time Provider Department Center   10/25/2024 12:00 PM Wendi Arechiga DO [facility]

## 2024-10-03 NOTE — TELEPHONE ENCOUNTER
----- Message from Wendi Arechiga sent at 10/1/2024  7:39 AM CDT -----  Patient started on Atorvastatin by diabetic clinic.  Please schedule the patient for his annual physical.   Clothing

## 2024-10-25 ENCOUNTER — OFFICE VISIT (OUTPATIENT)
Dept: FAMILY MEDICINE CLINIC | Facility: CLINIC | Age: 64
End: 2024-10-25
Payer: MEDICAID

## 2024-10-25 VITALS
BODY MASS INDEX: 24.78 KG/M2 | HEART RATE: 70 BPM | OXYGEN SATURATION: 99 % | HEIGHT: 71 IN | RESPIRATION RATE: 18 BRPM | WEIGHT: 177 LBS | TEMPERATURE: 98 F | DIASTOLIC BLOOD PRESSURE: 60 MMHG | SYSTOLIC BLOOD PRESSURE: 96 MMHG

## 2024-10-25 DIAGNOSIS — E78.2 MIXED HYPERLIPIDEMIA: ICD-10-CM

## 2024-10-25 DIAGNOSIS — N40.1 BENIGN PROSTATIC HYPERPLASIA (BPH) WITH STRAINING ON URINATION: ICD-10-CM

## 2024-10-25 DIAGNOSIS — Z79.4 TYPE 2 DIABETES MELLITUS WITH HYPERGLYCEMIA, WITH LONG-TERM CURRENT USE OF INSULIN (HCC): ICD-10-CM

## 2024-10-25 DIAGNOSIS — E11.65 TYPE 2 DIABETES MELLITUS WITH HYPERGLYCEMIA, WITH LONG-TERM CURRENT USE OF INSULIN (HCC): ICD-10-CM

## 2024-10-25 DIAGNOSIS — D12.6 TUBULAR ADENOMA OF COLON: ICD-10-CM

## 2024-10-25 DIAGNOSIS — R39.16 BENIGN PROSTATIC HYPERPLASIA (BPH) WITH STRAINING ON URINATION: ICD-10-CM

## 2024-10-25 DIAGNOSIS — Z00.00 WELLNESS EXAMINATION: Primary | ICD-10-CM

## 2024-10-25 DIAGNOSIS — E11.42 DIABETIC POLYNEUROPATHY ASSOCIATED WITH TYPE 2 DIABETES MELLITUS (HCC): ICD-10-CM

## 2024-10-25 RX ORDER — ATORVASTATIN CALCIUM 40 MG/1
40 TABLET, FILM COATED ORAL DAILY
Qty: 90 TABLET | Refills: 3 | Status: SHIPPED | OUTPATIENT
Start: 2024-10-25

## 2024-10-25 NOTE — PROGRESS NOTES
The 21st Century Cures Act makes medical notes like these available to patients in the interest of transparency. Please be advised this is a medical document. Medical documents are intended to carry relevant information, facts as evident, and the clinical opinion of the practitioner. The medical note is intended as peer to peer communication and may appear blunt or direct. It is written in medical language and may contain abbreviations or verbiage that are unfamiliar.     Chivo Miller is a 64 year old male who is here for   Chief Complaint   Patient presents with    Well Adult       HPI:     This 64-year-old male presents to the office for his annual wellness exam and to follow-up on his multiple medical problems.    The patient has history for type 2 diabetes which has been very difficult control.  He is under the care of the diabetic clinic and his hemoglobin A1c has slowly been improving.  He is on metformin ER 2 tablets once daily, Trulicity 0.75 mg injected once weekly, glipizide 10 mg daily, Jardiance 10 mg daily and Lantus injected up to 10 units daily via dose titration.  The patient is denying any hypoglycemic symptoms.  He states he is feeling much better as his diabetes has been improving.  He does workout at the gym at least 3 days a week.  He states he does try to watch his diet and avoid junk food and carbs.  He has been seen by his eye doctor in June and has been getting laser treatments to both eyes.  He is scheduled for follow-up next week.  He still is having some difficulty seeing out of the left eye.  He is taking gabapentin 100 mg 3 times daily for neuropathy.    The patient has history for hypercholesterolemia and is taking atorvastatin 20 mg nightly.  He has no history for HTN or known heart disease. He denies any chest pain, palpitations, shortness of breath, syncope, dizziness or leg swelling.    The patient also has history for BPH and his symptoms are controlled with his  Flomax.    The patient had his colonoscopy this year and a tubular adenoma was found  He is to have a repeat colonoscopy in 5 years.  He is denying any abdominal pain, nausea, vomiting, diarrhea, melena, hematochezia, change in bowel habits or weight loss.    He states he went to the [facility] and received all of his vaccines.    He is back to work as a .  Overall, he states he is feeling good and he has no concerns today.    Exercise: swimming, weights 3 times a week at the gym .  Diet: watches somewhat  Sleep: 6 hours     Depression/Anxiety:  PHQ-2 SCORE: 0  , done 1/16/2024   Last [place] Suicide Screening on 10/25/2024 was No Risk.       Fall Risk: No falls last 3 months  Gun in home:No             Glasses/contacts:Glasses             Recent eye doctor visit:6/12/2024 had laser treatment   Hearing aids:No    Family History for:  Testicular CA - No   Prostate CA - No                               Colon CA - No    Colonoscopy: due 2/14/2029    Past Medical History:    Allergic rhinitis, cause unspecified    Colon polyps    repeat CLN in 2029    Diabetes (HCC)    ED (erectile dysfunction)    Pure hypercholesterolemia    Type 2 diabetes mellitus with hyperglycemia, with long-term current use of insulin (HCC)    Unspecified hearing loss    Vitamin D deficiency       Past Surgical History:   Procedure Laterality Date    Colonoscopy N/A 02/14/2024    Dr. Stockton; diverticulosis, polyps, hemorrhoids    Colonoscopy N/A 2/14/2024    Procedure: COLONOSCOPY;  Surgeon: CHELSEA Stockton MD;  Location: [facility]       Family History   Problem Relation Age of Onset    Stroke Mother     Other (liver cancer) Father        Social History     Socioeconomic History    Marital status:    Tobacco Use    Smoking status: Never    Smokeless tobacco: Never   Vaping Use    Vaping status: Never Used   Substance and Sexual Activity    Alcohol use: Yes     Alcohol/week: 0.0 standard drinks of alcohol     Comment: occa    Drug  use: Yes     Types: Cannabis     Comment: occ    Sexual activity: Yes   Other Topics Concern     Service No    Blood Transfusions No    Caffeine Concern Yes     Comment: 1 cup daily    Occupational Exposure No    Hobby Hazards No    Sleep Concern No    Stress Concern No    Weight Concern No    Special Diet No    Back Care No    Exercise Yes     Comment: PT excersises at home    Bike Helmet No    Seat Belt No    Self-Exams No       Medications Ordered Prior to Encounter[1]    Allergies[2]    REVIEW OF SYSTEMS:     ROS is negative except as stated in HPI.    EXAM:   BP 96/60 (BP Location: Left arm, Patient Position: Sitting, Cuff Size: adult)   Pulse 70   Temp 98.1 °F (36.7 °C)   Resp 18   Ht 5' 11\" (1.803 m)   Wt 177 lb (80.3 kg)   SpO2 99%   BMI 24.69 kg/m²     Wt Readings from Last 3 Encounters:   10/25/24 177 lb (80.3 kg)   09/18/24 175 lb 9.6 oz (79.7 kg)   05/08/24 178 lb 6.4 oz (80.9 kg)     BP Readings from Last 3 Encounters:   10/25/24 96/60   09/18/24 118/70   05/08/24 110/60        Visual Acuity  Right Eye Visual Acuity: Corrected Right Eye Chart Acuity: 20/25   Left Eye Visual Acuity: Corrected Left Eye Chart Acuity: 20/25   Both Eyes Visual Acuity: Corrected Both Eyes Chart Acuity: 20/25   Able To Tolerate Visual Acuity: Yes      General: WH/WN/WD, in NAD, A and O times 3  HEAD: Normocephalic, atraumatic  EYES: NORBERTO, EOMI, conjunctiva normal, sclera anicteric  EARS: Tympanic membranes normal, EAC's normal.  NOSE: Turbninates normal, no bleeding noted.  PHARYNX:  No eythema or exudates, mucous membrane moist, airway patent.  NECK:  No cervical lymphadenopathy or thyromegaly, normal ROM, no bruits noted.  HEART: Regular rate and rhythm, no S3, S4 or murmur noted.  LUNGS: Clear to ausculation. No retractions or tachypnea noted.  ABDOMEN: Soft, nontender, no guarding, rigidity or rebound, no masses or hepatosplenomegaly, normal bowel sounds in all four quadrants.  : deferred  BACK: No  tenderness over the thoracic or lumbar spine. No scoliosis noted.  EXTREMITIES: No clubbing, cyanosis, edema noted. Motor strength +5/5 in all 4 extremities. DTR's +2/4 in all 4 extremities.  SKIN: Warm and dry.  NEURO: Cr. N. II-XII intact, normal gait  PSYCH: Mood and affect are normal.      The 10-year ASCVD risk score (Yuli BRUCE, et al., 2019) is: 11.2%    Values used to calculate the score:      Age: 64 years      Sex: Male      Is Non- : Yes      Diabetic: Yes      Tobacco smoker: No      Systolic Blood Pressure: 96 mmHg      Is BP treated: No      HDL Cholesterol: 51 mg/dL      Total Cholesterol: 234 mg/dL    ASSESSMENT AND PLAN:     1. Wellness examination  -We discussed the following:  Healthy diet and exercise, immunizations, cancer screening.    2. Type 2 diabetes mellitus with hyperglycemia, with long-term current use of insulin (HCC)  3. Diabetic polyneuropathy associated with type 2 diabetes mellitus (HCC)    Lab Results   Component Value Date    A1C 8.3 (A) 09/18/2024    A1C 7.6 (A) 05/08/2024    A1C 8.9 (A) 01/31/2024    A1C 9.5 (A) 01/16/2024    A1C 9.0 (A) 10/16/2023        The patient is to continue his medications as prescribed and keep his appointment as scheduled on 11/13/2024 with the diabetic clinic.    4. Mixed hyperlipidemia    Cholesterol:     Lab Results   Component Value Date    CHOLEST 234 (H) 09/30/2024    CHOLEST 242 (H) 06/12/2023    CHOLEST 254 (H) 11/16/2016     Lab Results   Component Value Date    HDL 51 09/30/2024    HDL 53 06/12/2023    HDL 56 11/16/2016     Lab Results   Component Value Date    TRIG 94 09/30/2024    TRIG 168 (H) 06/12/2023    TRIG 144 11/16/2016     Lab Results   Component Value Date     (H) 09/30/2024     (H) 06/12/2023     (H) 11/16/2016     Lab Results   Component Value Date    AST 25 09/30/2024    AST 18 06/12/2023    AST 12 (L) 11/16/2016     Lab Results   Component Value Date    ALT 23 09/30/2024    ALT 23  06/12/2023    ALT 32 11/16/2016     I am going to increase the Atorvastatin to 40 mg nightly and recheck lipid panel in 3 months.    5. Tubular adenoma of colon    The patient is due for repeat colonoscopy in 2029.    6. Benign prostatic hyperplasia (BPH) with straining on urination    Symptoms are controlled with his Flomax.     Health maintenance:    Health Maintenance   Topic Date Due    Annual Physical  10/25/2025    Diabetes Care A1C  12/18/2024    Diabetes Care Foot Exam  01/16/2025    Diabetes Care: Microalb/Creat Ratio  01/31/2025    Diabetes Care Dilated Eye Exam  06/12/2025    PSA  06/12/2025    Diabetes Care: GFR  09/30/2025    Colorectal Cancer Screening  02/14/2029    DTaP,Tdap,and Td Vaccines (2 - Td or Tdap) 02/11/2033    Influenza Vaccine  Completed    Annual Depression Screening  Completed    Pneumococcal Vaccine: Birth to 64yrs  Completed    Zoster Vaccines  Completed     Orders This Visit:  No orders of the defined types were placed in this encounter.      Meds This Visit:  Requested Prescriptions     Signed Prescriptions Disp Refills    atorvastatin 40 MG Oral Tab 90 tablet 3     Sig: Take 1 tablet (40 mg total) by mouth daily.       Imaging & Referrals:  None     The patient indicates understanding of these issues and agrees to the plan.  The patient is asked to return in 6 months.  Wnedi Arechiga,     I spent a total of 30 minutes including precharting, H&P, plan of care    This dictation was performed with a verbal recognition program (DRAGON) and it was checked for errors. It is possible that there are still dictated errors within this office note. If so, please bring any errors to my attention for an addendum. All efforts were made to ensure that this office note is accurate         [1]   Current Outpatient Medications on File Prior to Visit   Medication Sig Dispense Refill    Dulaglutide (TRULICITY) 0.75 MG/0.5ML Subcutaneous Solution Pen-injector Inject 0.75 mg into the skin once a  week. 2 mL 3    insulin glargine (LANTUS SOLOSTAR) 100 UNIT/ML Subcutaneous Solution Pen-injector Up to 15 units daily w dose titration (Patient taking differently: Up to 10 units daily w dose titration) 6 mL 2    metFORMIN  MG Oral Tablet 24 Hr 2 TAB DAILY IN am 180 tablet 1    empagliflozin (JARDIANCE) 10 MG Oral Tab Take 1 tablet (10 mg total) by mouth daily. 90 tablet 1    glipiZIDE 10 MG Oral Tab Take 1 tablet (10 mg total) by mouth daily with breakfast. 90 tablet 1    Glucose Blood (ONETOUCH VERIO) In Vitro Strip Use as directed once daily. 100 strip 1    gabapentin 100 MG Oral Cap Take 1 capsule (100 mg total) by mouth 3 (three) times daily. 90 capsule 0    tamsulosin 0.4 MG Oral Cap Take 1 capsule (0.4 mg total) by mouth daily. 90 capsule 3    cyclobenzaprine 10 MG Oral Tab Take 1 tablet (10 mg total) by mouth nightly as needed.      ibuprofen 600 MG Oral Tab Take 1 tablet (600 mg total) by mouth every 6 (six) hours as needed for Pain.      Insulin Pen Needle (PEN NEEDLES) 32G X 6 MM Does not apply Misc 1 each at bedtime. Use 1 pen needle with the Lantus every night 100 each 1    Alcohol Swabs 70 % Does not apply Pads Take 1 Bottle by mouth As Directed.      brimonidine 0.2 % Ophthalmic Solution Place 1 drop into the right eye in the morning, at noon, and at bedtime.      Lancets Does not apply Misc Test twice daily 100 each 0    Glucose Blood (TRUETRACK TEST) In Vitro Strip TEST TWICE DAILY 200 each 1     No current facility-administered medications on file prior to visit.   [2] No Known Allergies

## 2024-10-25 NOTE — PATIENT INSTRUCTIONS
I am increasing your Atorvastatin to 40 mg nightly to help improve your cholesterol. Continue the rest of your medications as prescribed.    Keep your appointment with your eye doctor as scheduled.    Keep your appointment with the diabetic clinic as scheduled.    Recommendations for exercise are 3-5 times weekly for 30-60 minutes for a minimum of 150-300 minutes.     For premenopausal women and men, 1000 mg of calcium daily is recommended. For postmenopausal women, 1200 mg of calcium daily is recommended.    To help the body absorb and use calcium, vitamin D 2000 international units daily is recommended.    See me in 6 months.    [employer]  What is Type 2 Diabetes?  Watch this clip to understand what happens within your body when you have type 2 diabetes, and the importance of keeping your blood glucose levels within a healthy range.  To watch the video:  Scan the QR code  Using your mobile device, scan the following code:  OR  Go to the website:  [employer]  Enter the prescription code:  1576E    © 4729-9923 [employer]. All rights reserved. This information is not intended as a substitute for professional medical care. Always follow your healthcare professional's instructions.    Managing Type 2 Diabetes  Type 2 diabetes is a long-term (chronic) condition. Managing diabetes may mean making some tough changes. Yourhealthcare team can help you.  To manage type 2 diabetes, you'll need to balance your medicine with diet and activity. You will also need check your blood sugar. And work with yourhealthcare provider to prevent complications.    Take your medicine  You may take pills or give yourself insulin shots for diabetes. Or you may use both. Take your medicines or give yourself insulin at the right times to help you control your blood sugar. Think about ways that will help you remember to take your medicines the right way every day. Ask your healthcare provider orteam for  ideas.  You may only take pills for your diabetes. But this may change. Over time, mostpeople with type 2 diabetes also need insulin.  Eat healthy  A healthy diet helps control the amount of sugar in your blood. It also helps you stay at a healthy weight. Or it helps you lose weight, if if you'reoverweight. Extra weight makes it harder to control diabetes.  Your healthcare team will help you create a plan that works for you. You don'thave to give up all the foods you like. Have meals and snacks with:  Vegetables  Fruits  Lean meats or other healthy proteins  Whole grains  Low- or nonfat dairy products     Be physically active  Being active helps lower your blood sugar. Activity helps your body use insulinto turn food into energy. It also helps you manage your weight:  Ask your healthcare provider to help you to make an activity program that's right for you. Your program is based on your age, general health, and types of activity you enjoy. Start slow. But aim for at least 150 minutes of exercise or activity each week. Start with 30 minutes a day. Exercise in 10-minute blocks. Don’t let more than 2 days go by without being active.  Check your blood sugar  Checking your own blood sugar may be a regular part of your care. Or you may only need to check your blood sugar from time to time. Your healthcare provider will tell you how to check your blood sugar at home. Checking it tells you if your blood sugar is in your target range. Having your blood sugars within thetarget range means that you are managing your diabetes well.  If your blood sugar levels are too high or too low, your healthcare provider may suggest changes to your diet or activity level. He or she may also adjustyour medicine.  Your healthcare provider may also tell you to check your blood sugar more oftenwhen you are sick.  Take care of yourself  When you have diabetes, you may be more likely to get other health problems. They include foot, eye, heart,  nerve, and kidney problems. You can help prevent these problems by controlling your blood sugar and taking good care of yourself. Your healthcare provider, nurse, diabetes educator, and others canhelp you with the following:  Checkups. You should have regular checkups with your healthcare provider. At those visits, you will have a physical exam that includes checking your feet. Your healthcare provider will also check your blood pressure and weight. Take your shoes off before your appointment starts to be sure your feet are checked.  Other exams. You'll also need eye, foot, and dental exams at least once each year.  Lab tests. You will have blood and urine tests:  Your healthcare provider will check your hemoglobin A1C at least twice a year. This blood test shows how well you have been controlling your blood sugar over 2 to 3 months. The results help your healthcare provider manage your diabetes.    You will also have other lab tests. For example, to check for kidney problems and abnormal cholesterol levels.  Smoking. If you smoke, you will need to quit. Smoking makes it more likely to get complications from diabetes. Ask your healthcare provider about ways to quit. Also don't use e-cigarette, or vaping, products.  Vaccines. Get a yearly flu shot. And ask your healthcare provider about vaccines to prevent pneumonia, shingles, and hepatitis B.  Stress and depression  Most people have challenges throughout their lives. Living with diabetes can increase your stress. Feeling stressed or depressed can actually affect yourblood sugar levels.  Tell your healthcare provider if you are having trouble coping with diabetes.He or she can help or refer you to other providers or programs.  To learn more  Know where you can get help. You can try the following:  Support. Ask family and friends to support your efforts to take care of yourself. Or look for a diabetes support group nearby or on the internet. Check the Connect with  Others link on www.diabetes.org.  Counseling. Talk with a , psychologist, psychiatrist, or other counselor.  Information. Contact the American Diabetes Association at 383-822-3395 or www.diabetes.org  [employer] last reviewed this educational content on11/1/2019    © 5404-8386 [employer]. All rights reserved. This information is not intended as a substitute for professional medical care. Always follow yourhealthcare professional's instructions.    Type 2 Diabetes and Food Choices  You make food choices every day. Whole wheat or white bread? A side of French fries or fresh fruit? Eat now or later? Choices about what, when, and how much you eat affect your blood sugar (glucose), and also your blood pressure and cholesterol. Understanding how food affects blood glucose is the first step in managing diabetes. And following a diabetesmeal plan can help you keep your blood glucose levels on track.   Prevent problems  Having type 2 diabetes means that your body doesn’t control blood glucose well. When blood glucose stays too high for too long, serious health problems can develop. It's important to control your blood glucose through diet, exercise, and medicine. This can delay or prevent kidney,eye, nerve, and heart disease, and other complications of diabetes.   Control carbohydrates  Carbohydrates are foods that have the biggest effect on your blood glucose levels. After you eat carbohydrates, your blood glucose rises. Fruit, sweet foods and drinks, starchy foods (such as bread, potatoes, and rice), and milk and milk products contain carbohydrates. Carbohydrates are important for health. But when you eat too many at once, your blood glucose can go too high. This is even more likely if you don't have or take enough insulin forthat food.   Some carbohydrates may raise blood glucose more than others. These include potatoes, sweets, and white bread. Better choices are less processed foods with more  fiber and nutrients. Good options are 100% whole-wheat bread, oatmeal,brown rice, and nonstarchy vegetables.   Learn to use food labels that show added sugar. And try to find healthierchoices, particularly if you are overweight.    Food and medicine  Insulin helps glucose move from the blood into your muscle cells, where it can be used for energy. Some diabetes medicines that are taken by mouth help you make more insulin. Or they help your insulin work more efficiently. So your medicines and food plan have to work together. If you take insulin shots, you need to be very careful to match the amount of carbohydrates you eat with your insulin dose. If you have too many carbohydrates without adjusting your insulin dose, your blood glucose might become too high. If you have too few carbohydrates, your blood glucose might be too low. Your healthcare provider ora dietitian can help you match your food choices to your medicine.   Have a meal plan  With certain medicines, it's best to eat the same amount of food at the same time every day. That keeps your glucose levels stable. And it helps your medicine work best. Physical activity is an important way to control blood glucose, too. Try to exercise at the same time every day. That way you can build the extra calories you need for exercise into your meal plan. With othermedicines, you may have more choices about how much you eat and when.   If you want to change your medicine to better fit your lifestyle, talk withyour healthcare provider.   Eat smart  You can eat the same foods as everyone else, but you have to carefully watch for certain details. That’s where your diabetes meal plan comes in. A personal meal plan tells you the time of day to eat meals and snacks, the types of food to eat, and how much. Itshould include your favorite foods. And it should focus on these healthy foods:   Whole grains, such as 100% whole-wheat bread, brown rice, and oatmeal  Nonfat or low-fat  dairy products, such as nonfat milk and yogurt (but be sure these products don't have sugar added to make up for the fat removed)  Lean meats, poultry, fish, eggs, and dried beans and peas  Foods and drinks with no added sugar  Fruits and vegetables  At first, it may be helpful to use measuring cups and spoons to make sure you’re really eating the amount of food that’s in your plan. You can also use standardized portion sizes on food labels, such as 1 serving of meat being the size of a deck of cards. By checking your blood glucose 1 to 2 hours after eating, you can learn how your food choicesaffect your blood glucose.   To create a diabetes meal plan or change a plan that’s not working for you, see a dietitian or diabetes educator. Let them know if you have any new health concerns or if your medicines have changed. Having ameal plan that you can live with will keep you at your healthy best.     © 6172-3262 [employer]. All rights reserved. This information is not intended as a substitute for professional medical care. Always follow yourhealthcare professional's instructions.    Diabetes: Caring for Your Body   When you have diabetes, your body needs special care. This care helps you stay healthy and prevent problems. Exercise and healthy eating are a part of this. You can also protect yourself by taking special care of your feet, skin, teeth,and eyes.   Caring for your feet     Follow these tips to help keep your feet healthy:  Check your feet every day for redness, blisters, cracks, dry skin, or numbness. Use a mirror to check the bottoms of your feet, if needed. Or ask for help.  Wash your feet in warm (not hot) water. Don’t soak them.  Use an emery board to keep your toenails even with the ends of your toes. File away sharp edges. A foot doctor (podiatrist) may need to cut your toenails for you.  Smooth down calluses gently or wait until your next podiatry appointment.  Keep your skin soft and  smooth by putting a thin layer of skin lotion on the tops and bottoms of your feet. Don't put lotion in between your toes.  Always wear shoes or slippers, even inside your home. Make sure that shoes are correctly fitted, not too tight and not too loose. This can cause friction and rubbing of your feet. Change your socks daily. Always check shoes for foreign objects before putting them on.  Call your healthcare provider right away if your feet are numb or painful. Also call your provider if a cut or sore doesn’t heal in a few days.  Preventing skin infections   To prevent skin infections, bathe every day, but use a moderate water temperature.. Dry yourself well, especially between your toes. Try to keep your home on the humid side during the colder months of the year to prevent your skin getting dry. Wash any cuts with warm, soapy water. Cover with a sterile bandage. Call your healthcare provider if a cut or sore doesn't heal in a fewdays, feels warm, itches, is swollen, or has a bad smell.   Caring for your teeth   Follow these guidelines for healthy teeth:  Brush your teeth twice daily.  Floss your teeth daily.  See your dentist at least twice yearly.  Keep your blood sugar in a good range.  Caring for your eyes  Have your eyes checked every year by an optometrist or ophthalmologist. Letyour healthcare provider know if you experience any of the following symptoms:   Blurred vision  Dark spots or \"holes\"  Flashes of light  Seeing more floaters than usual  Poor night vision  If you smoke, quit  Smoking is dangerous for everyone, especially people with diabetes. It can harm the blood vessels in your eyes, kidneys, nerves, and heart. It raises blood pressure. Smoking can also slow healing, so infections are more likely. Askyour healthcare provider about programs to help you stop smoking.   [employer] last reviewed this educational content on12/1/2021 © 2000-2022 [employer]. All rights reserved. This  information is not intended as a substitute for professional medical care. Always follow yourhealthcare professional's instructions.

## 2025-01-07 ENCOUNTER — TELEPHONE (OUTPATIENT)
Dept: ENDOCRINOLOGY CLINIC | Facility: CLINIC | Age: 65
End: 2025-01-07

## 2025-01-27 NOTE — TELEPHONE ENCOUNTER
LOV 9/6/24   4.  Continue current primidone, propranolol and carbidopa/levodopa.   Last refill 1/5/24 #450 tabs, 3 refills      Patient is calling states that he needs to reschedule procedure.  Please call

## 2025-02-07 ENCOUNTER — OFFICE VISIT (OUTPATIENT)
Dept: FAMILY MEDICINE CLINIC | Facility: CLINIC | Age: 65
End: 2025-02-07
Payer: MEDICAID

## 2025-02-07 VITALS
DIASTOLIC BLOOD PRESSURE: 52 MMHG | HEIGHT: 71 IN | RESPIRATION RATE: 18 BRPM | OXYGEN SATURATION: 97 % | HEART RATE: 60 BPM | SYSTOLIC BLOOD PRESSURE: 100 MMHG | BODY MASS INDEX: 24.22 KG/M2 | TEMPERATURE: 97 F | WEIGHT: 173 LBS

## 2025-02-07 DIAGNOSIS — M54.12 CERVICAL RADICULOPATHY: ICD-10-CM

## 2025-02-07 DIAGNOSIS — Z79.4 TYPE 2 DIABETES MELLITUS WITH HYPERGLYCEMIA, WITH LONG-TERM CURRENT USE OF INSULIN (HCC): Primary | ICD-10-CM

## 2025-02-07 DIAGNOSIS — E78.2 MIXED HYPERLIPIDEMIA: ICD-10-CM

## 2025-02-07 DIAGNOSIS — R39.16 BENIGN PROSTATIC HYPERPLASIA (BPH) WITH STRAINING ON URINATION: ICD-10-CM

## 2025-02-07 DIAGNOSIS — H53.9 CHANGE IN VISION: ICD-10-CM

## 2025-02-07 DIAGNOSIS — N40.1 BENIGN PROSTATIC HYPERPLASIA (BPH) WITH STRAINING ON URINATION: ICD-10-CM

## 2025-02-07 DIAGNOSIS — H91.93 BILATERAL HEARING LOSS, UNSPECIFIED HEARING LOSS TYPE: ICD-10-CM

## 2025-02-07 DIAGNOSIS — E11.65 TYPE 2 DIABETES MELLITUS WITH HYPERGLYCEMIA, WITH LONG-TERM CURRENT USE OF INSULIN (HCC): Primary | ICD-10-CM

## 2025-02-07 LAB
CREAT UR-SCNC: 77.4 MG/DL
HEMOGLOBIN A1C: 8.9 % (ref 4.3–5.6)
MICROALBUMIN UR-MCNC: <0.3 MG/DL

## 2025-02-07 PROCEDURE — 83036 HEMOGLOBIN GLYCOSYLATED A1C: CPT | Performed by: FAMILY MEDICINE

## 2025-02-07 PROCEDURE — 99215 OFFICE O/P EST HI 40 MIN: CPT | Performed by: FAMILY MEDICINE

## 2025-02-07 PROCEDURE — 82570 ASSAY OF URINE CREATININE: CPT | Performed by: FAMILY MEDICINE

## 2025-02-07 PROCEDURE — 82043 UR ALBUMIN QUANTITATIVE: CPT | Performed by: FAMILY MEDICINE

## 2025-02-07 RX ORDER — TAMSULOSIN HYDROCHLORIDE 0.4 MG/1
0.4 CAPSULE ORAL DAILY
Qty: 90 CAPSULE | Refills: 3 | Status: SHIPPED | OUTPATIENT
Start: 2025-02-07

## 2025-02-07 RX ORDER — ATORVASTATIN CALCIUM 40 MG/1
40 TABLET, FILM COATED ORAL DAILY
Qty: 90 TABLET | Refills: 3 | Status: SHIPPED | OUTPATIENT
Start: 2025-02-07

## 2025-02-07 RX ORDER — DULAGLUTIDE 0.75 MG/.5ML
0.75 INJECTION, SOLUTION SUBCUTANEOUS WEEKLY
Qty: 0.5 ML | Refills: 3 | Status: SHIPPED | OUTPATIENT
Start: 2025-02-07

## 2025-02-07 RX ORDER — INSULIN GLARGINE 100 [IU]/ML
INJECTION, SOLUTION SUBCUTANEOUS
Qty: 6 ML | Refills: 2 | Status: SHIPPED | OUTPATIENT
Start: 2025-02-07

## 2025-02-07 RX ORDER — METFORMIN HYDROCHLORIDE 500 MG/1
TABLET, EXTENDED RELEASE ORAL
Qty: 180 TABLET | Refills: 1 | Status: SHIPPED | OUTPATIENT
Start: 2025-02-07

## 2025-02-07 RX ORDER — GLIPIZIDE 10 MG/1
10 TABLET ORAL
Qty: 90 TABLET | Refills: 1 | Status: SHIPPED | OUTPATIENT
Start: 2025-02-07

## 2025-02-07 RX ORDER — DORZOLAMIDE HYDROCHLORIDE AND TIMOLOL MALEATE 20; 5 MG/ML; MG/ML
1 SOLUTION/ DROPS OPHTHALMIC 2 TIMES DAILY
COMMUNITY
Start: 2025-01-09

## 2025-02-07 NOTE — PATIENT INSTRUCTIONS
Continue all your medication as prescribed.    Keep your appointment with the eye doctor as scheduled.    Keep your appointment with the diabetic clinic as scheduled.    Make an appointment with the ENT doctor, Dr. Juarez, for recheck on your hearing.    Take the Gabapentin 100 mg three times a day for your neck and pain pain.    See me in June.

## 2025-02-07 NOTE — PROGRESS NOTES
The 21st Century Cures Act makes medical notes like these available to patients in the interest of transparency. Please be advised this is a medical document. Medical documents are intended to carry relevant information, facts as evident, and the clinical opinion of the practitioner. The medical note is intended as peer to peer communication and may appear blunt or direct. It is written in medical language and may contain abbreviations or verbiage that are unfamiliar.       Chivo Miller is a 64 year old male.    HPI:     Chief Complaint   Patient presents with    Follow - Up    Diabetes       This 64-year-old male presents to the office for follow-up on his type 2 diabetes and he has several other concerns.    The patient missed his appointment at the diabetic clinic in January.  He states his insurance changed and now he needs refills on all of his medications.  He states he is taking all of his medications as prescribed.  His blood sugar this morning was 136.  He states they have been added ranging in the 120s.  He has had no hypoglycemic symptoms or sugars under 70.  He is scheduled for a follow-up visit in May at the diabetic clinic.    The patient has an appointment scheduled with Dr. Hooks, his ophthalmologist, for follow up on changes in his vision.  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  Now his \"good eye,\" the left eye, is giving him difficulty.  He is starting to see black streaks and white lights in the periphery of the left eye.  He states this is making it very difficult for him to drive.  It will sometimes bother him so much, he cannot see in the periphery to change lanes.  He does take dorzolamide-timolol eyedrops daily.    The patient states he is no longer able to drive not only due to his vision but because of worsening neck and cervical radicular symptoms.  He had a Worker's Comp. claim previously due to  injury to his neck and shoulder.  He is not currently taking the gabapentin 100 mg 3 times daily.  He states the pain is now keeping him awake at night and will shoot down his back.  His  told him he should get a letter from his doctor stating he cannot drive to help facilitate getting disability.    The patient has also noted progressive worsening of his hearing and would like to be referred to an ENT doctor for evaluation.  He does not currently wear hearing aids.    HISTORY:  Past Medical History:    Allergic rhinitis, cause unspecified    Colon polyps    repeat CLN in 2029    Diabetes (HCC)    ED (erectile dysfunction)    Pure hypercholesterolemia    Type 2 diabetes mellitus with hyperglycemia, with long-term current use of insulin (HCC)    Unspecified hearing loss    Vitamin D deficiency      Past Surgical History:   Procedure Laterality Date    Colonoscopy N/A 02/14/2024    Dr. Stockton; diverticulosis, polyps, hemorrhoids    Colonoscopy N/A 2/14/2024    Procedure: COLONOSCOPY;  Surgeon: CHELSEA Stockton MD;  Location: [facility]      Family History   Problem Relation Age of Onset    Stroke Mother     Other (liver cancer) Father       Social History:   Social History     Socioeconomic History    Marital status:    Tobacco Use    Smoking status: Never    Smokeless tobacco: Never   Vaping Use    Vaping status: Never Used   Substance and Sexual Activity    Alcohol use: Yes     Alcohol/week: 0.0 standard drinks of alcohol     Comment: occa    Drug use: Yes     Types: Cannabis     Comment: occ    Sexual activity: Yes   Other Topics Concern     Service No    Blood Transfusions No    Caffeine Concern Yes     Comment: 1 cup daily    Occupational Exposure No    Hobby Hazards No    Sleep Concern No    Stress Concern No    Weight Concern No    Special Diet No    Back Care No    Exercise Yes     Comment: PT excersises at home    Bike Helmet No    Seat Belt No    Self-Exams No     Social Drivers of Health      Food Insecurity: No Food Insecurity (2/7/2025)    NCSS - Food Insecurity     Worried About Running Out of Food in the Last Year: No     Ran Out of Food in the Last Year: No   Transportation Needs: No Transportation Needs (2/7/2025)    NCSS - Transportation     Lack of Transportation: No   Housing Stability: Not At Risk (2/7/2025)    NCSS - Housing/Utilities     Has Housing: Yes     Worried About Losing Housing: No     Unable to Get Utilities: No        Medications (Active prior to today's visit):  Current Outpatient Medications   Medication Sig Dispense Refill    atorvastatin 40 MG Oral Tab Take 1 tablet (40 mg total) by mouth daily. 90 tablet 3    tamsulosin 0.4 MG Oral Cap Take 1 capsule (0.4 mg total) by mouth daily. 90 capsule 3    glipiZIDE 10 MG Oral Tab Take 1 tablet (10 mg total) by mouth daily with breakfast. 90 tablet 1    empagliflozin (JARDIANCE) 10 MG Oral Tab Take 1 tablet (10 mg total) by mouth daily. 90 tablet 1    Dulaglutide (TRULICITY) 0.75 MG/0.5ML Subcutaneous Solution Auto-injector Inject 0.75 mg into the skin once a week. 0.5 mL 3    metFORMIN  MG Oral Tablet 24 Hr 2 TAB DAILY IN am 180 tablet 1    insulin glargine (LANTUS SOLOSTAR) 100 UNIT/ML Subcutaneous Solution Pen-injector Up to 15 units daily w dose titration 6 mL 2    dorzolamide-timolol 2-0.5 % Ophthalmic Solution Place 1 drop into both eyes 2 (two) times daily.      Glucose Blood (ONETOUCH VERIO) In Vitro Strip Use as directed once daily. 100 strip 1    gabapentin 100 MG Oral Cap Take 1 capsule (100 mg total) by mouth 3 (three) times daily. 90 capsule 0    cyclobenzaprine 10 MG Oral Tab Take 1 tablet (10 mg total) by mouth nightly as needed.      ibuprofen 600 MG Oral Tab Take 1 tablet (600 mg total) by mouth every 6 (six) hours as needed for Pain.      Insulin Pen Needle (PEN NEEDLES) 32G X 6 MM Does not apply Misc 1 each at bedtime. Use 1 pen needle with the Lantus every night 100 each 1    Alcohol Swabs 70 % Does not  apply Pads Take 1 Bottle by mouth As Directed.      brimonidine 0.2 % Ophthalmic Solution Place 1 drop into the right eye in the morning, at noon, and at bedtime.      Lancets Does not apply Misc Test twice daily 100 each 0    Glucose Blood (TRUETRACK TEST) In Vitro Strip TEST TWICE DAILY 200 each 1       Allergies:  Allergies[1]    ROS:     Pertinent positives and pertinent negatives are as listed in HPI.    All other review of symptoms were reviewed and negative.    PHYSICAL EXAM:   /52 (BP Location: Left arm, Patient Position: Sitting, Cuff Size: adult)   Pulse 60   Temp 97.3 °F (36.3 °C)   Resp 18   Ht 5' 11\" (1.803 m)   Wt 173 lb (78.5 kg)   SpO2 97%   BMI 24.13 kg/m²     Wt Readings from Last 3 Encounters:   02/07/25 173 lb (78.5 kg)   10/25/24 177 lb (80.3 kg)   09/18/24 175 lb 9.6 oz (79.7 kg)       BP Readings from Last 3 Encounters:   02/07/25 100/52   10/25/24 96/60   09/18/24 118/70     Weight is down 4 pounds from his 10/25/2024 office visit.    General: Well-nourished, well hydrated. No acute distress.  Patient is repeatedly asking me to repeat questions as he is demonstrating difficulty with his hearing, no pallor.   HEENT: Normocephalic, atraumatic.  NORBERTO, EOMI, Sclera clear and non icteric bilaterally. TM's normal, nose without congestion, pharynx without redness or exudates. Moist mucous membranes.  Neck: Supple. No lymphadenopathy. No thyromegaly. No bruits noted.  Heart: RRR without S3 or S4 or murmur.  Lungs: Clear to auscultation bilaterally. No rales, rhonchi or wheezes. No tachypnea or retractions noted.  Abdomen: Soft, nontender, nondistended, normal bowel sounds. No organomegaly. No guarding, rigidity or rebound noted.  Extremities: No edema bilaterally.  Bilateral barefoot skin diabetic exam is normal, visualized feet and the appearance is normal.  Bilateral monofilament/sensation of both feet is normal.  Pulsation pedal pulse exam of both lower legs/feet is normal as  well.  Skin: Warm and dry.  Neuro: Alert and oriented x 3.normal gait.  Psych: Normal mood and affect.     ASSESSMENT/PLAN:   64 year old male with        1. Type 2 diabetes mellitus with hyperglycemia, with long-term current use of insulin ([facility])    Lab Results   Component Value Date    A1C 8.9 (A) 02/07/2025    A1C 8.3 (A) 09/18/2024    A1C 7.6 (A) 05/08/2024    A1C 8.9 (A) 01/31/2024    A1C 9.5 (A) 01/16/2024      I discussed the results of the hemoglobin A1c with the patient.  This has gone up from September.  I encouraged him to keep his appointment with the diabetic clinic.  Urine for microalbumin was obtained today.  Diabetic foot care is discussed.  I refilled his medications.  He is to keep his appointment with his eye doctor as scheduled.    - POC Hemoglobin A1C  - Microalb/Creat Ratio, Random Urine [E]; Future  - Microalb/Creat Ratio, Random Urine [E]  - glipiZIDE 10 MG Oral Tab; Take 1 tablet (10 mg total) by mouth daily with breakfast.  Dispense: 90 tablet; Refill: 1  - empagliflozin (JARDIANCE) 10 MG Oral Tab; Take 1 tablet (10 mg total) by mouth daily.  Dispense: 90 tablet; Refill: 1  - Dulaglutide (TRULICITY) 0.75 MG/0.5ML Subcutaneous Solution Auto-injector; Inject 0.75 mg into the skin once a week.  Dispense: 0.5 mL; Refill: 3  - metFORMIN  MG Oral Tablet 24 Hr; 2 TAB DAILY IN am  Dispense: 180 tablet; Refill: 1  - insulin glargine (LANTUS SOLOSTAR) 100 UNIT/ML Subcutaneous Solution Pen-injector; Up to 15 units daily w dose titration  Dispense: 6 mL; Refill: 2    2. Mixed hyperlipidemia    Cholesterol:     Lab Results   Component Value Date    CHOLEST 234 (H) 09/30/2024    CHOLEST 242 (H) 06/12/2023    CHOLEST 254 (H) 11/16/2016     Lab Results   Component Value Date    HDL 51 09/30/2024    HDL 53 06/12/2023    HDL 56 11/16/2016     Lab Results   Component Value Date    TRIG 94 09/30/2024    TRIG 168 (H) 06/12/2023    TRIG 144 11/16/2016     Lab Results   Component Value Date     (H)  09/30/2024     (H) 06/12/2023     (H) 11/16/2016     Lab Results   Component Value Date    AST 25 09/30/2024    AST 18 06/12/2023    AST 12 (L) 11/16/2016     Lab Results   Component Value Date    ALT 23 09/30/2024    ALT 23 06/12/2023    ALT 32 11/16/2016     Patient is due for recheck on his lipid panel.  If his LDL continues to stay elevated, he will need increase in his atorvastatin dose.    - atorvastatin 40 MG Oral Tab; Take 1 tablet (40 mg total) by mouth daily.  Dispense: 90 tablet; Refill: 3    3. Benign prostatic hyperplasia (BPH) with straining on urination    I refilled the patient's Flomax.    - tamsulosin 0.4 MG Oral Cap; Take 1 capsule (0.4 mg total) by mouth daily.  Dispense: 90 capsule; Refill: 3    4. Cervical radiculopathy    I told the patient he should restart the gabapentin at 100 mg 3 times daily which she has at home.    5. Change in vision    The patient has history of loss of vision in the right eye and now has progressively worsening symptoms in the left eye.  I told him to keep his appointment with the eye doctor as scheduled.    6. Bilateral hearing loss, unspecified hearing loss type    The patient has been referred to , for further evaluation of his hearing.    - ENT Referral - In Network    Other orders  - dorzolamide-timolol 2-0.5 % Ophthalmic Solution; Place 1 drop into both eyes 2 (two) times daily.         Health Maintenance:    Health Maintenance   Topic Date Due    Diabetes Care A1C  05/07/2025    Annual Depression Screening  Completed    Diabetes Care: Foot Exam (Annual)  Completed    Diabetes Care: Microalb/Creat Ratio (Annual)  Completed    Diabetes Care Dilated Eye Exam  06/12/2025    PSA  06/12/2025    Diabetes Care: GFR  09/30/2025    Annual Physical  10/25/2025    Colorectal Cancer Screening  02/14/2029    DTaP,Tdap,and Td Vaccines (2 - Td or Tdap) 02/11/2033    Influenza Vaccine  Completed    Pneumococcal Vaccine: 50+ Years  Completed    Zoster  Vaccines  Completed    Meningococcal B Vaccine  Aged Out         Meds This Visit:  Requested Prescriptions     Signed Prescriptions Disp Refills    atorvastatin 40 MG Oral Tab 90 tablet 3     Sig: Take 1 tablet (40 mg total) by mouth daily.    tamsulosin 0.4 MG Oral Cap 90 capsule 3     Sig: Take 1 capsule (0.4 mg total) by mouth daily.    glipiZIDE 10 MG Oral Tab 90 tablet 1     Sig: Take 1 tablet (10 mg total) by mouth daily with breakfast.    empagliflozin (JARDIANCE) 10 MG Oral Tab 90 tablet 1     Sig: Take 1 tablet (10 mg total) by mouth daily.    Dulaglutide (TRULICITY) 0.75 MG/0.5ML Subcutaneous Solution Auto-injector 0.5 mL 3     Sig: Inject 0.75 mg into the skin once a week.    metFORMIN  MG Oral Tablet 24 Hr 180 tablet 1     Si TAB DAILY IN am    insulin glargine (LANTUS SOLOSTAR) 100 UNIT/ML Subcutaneous Solution Pen-injector 6 mL 2     Sig: Up to 15 units daily w dose titration       Imaging & Referrals:  ENT - INTERNAL     Patient understands plan and follow-up.  Follow up in  for recheck on DM.    2025  Wendi Arechiga DO    Total time: 40 minutes including precharting, H&P, plan of care    This dictation was performed with a verbal recognition program (DRAGON) and it was checked for errors. It is possible that there are still dictated errors within this office note. If so, please bring any errors to my attention for an addendum. All efforts were made to ensure that this office note is accurate           [1] No Known Allergies

## 2025-03-28 ENCOUNTER — TELEPHONE (OUTPATIENT)
Dept: FAMILY MEDICINE CLINIC | Facility: CLINIC | Age: 65
End: 2025-03-28

## 2025-03-28 NOTE — TELEPHONE ENCOUNTER
Patient stated  was requesting a letter from Dr. Wendi Arechiga. Stating that  was out of work, and to explain his back injury. He did  had requested this letter already. Aware of no record on file.    I was not able to obtain more information as patient sounded like he was in a hurry. Patient will call back.

## 2025-04-10 RX ORDER — GABAPENTIN 100 MG/1
100 CAPSULE ORAL 3 TIMES DAILY
Qty: 90 CAPSULE | Refills: 0 | OUTPATIENT
Start: 2025-04-10

## 2025-04-16 ENCOUNTER — OFFICE VISIT (OUTPATIENT)
Dept: ENDOCRINOLOGY CLINIC | Facility: CLINIC | Age: 65
End: 2025-04-16
Payer: MEDICAID

## 2025-04-16 VITALS
OXYGEN SATURATION: 97 % | RESPIRATION RATE: 16 BRPM | HEART RATE: 74 BPM | BODY MASS INDEX: 25 KG/M2 | WEIGHT: 176.19 LBS | DIASTOLIC BLOOD PRESSURE: 56 MMHG | SYSTOLIC BLOOD PRESSURE: 106 MMHG

## 2025-04-16 DIAGNOSIS — E78.2 MIXED HYPERLIPIDEMIA: ICD-10-CM

## 2025-04-16 DIAGNOSIS — Z79.4 TYPE 2 DIABETES MELLITUS WITH HYPERGLYCEMIA, WITH LONG-TERM CURRENT USE OF INSULIN (HCC): Primary | ICD-10-CM

## 2025-04-16 DIAGNOSIS — E11.65 TYPE 2 DIABETES MELLITUS WITH HYPERGLYCEMIA, WITH LONG-TERM CURRENT USE OF INSULIN (HCC): Primary | ICD-10-CM

## 2025-04-16 LAB
GLUCOSE BLOOD: 341
HEMOGLOBIN A1C: 8.9 % (ref 4.3–5.6)
TEST STRIP LOT #: NORMAL NUMERIC

## 2025-04-16 PROCEDURE — 83036 HEMOGLOBIN GLYCOSYLATED A1C: CPT | Performed by: NURSE PRACTITIONER

## 2025-04-16 PROCEDURE — 99215 OFFICE O/P EST HI 40 MIN: CPT | Performed by: NURSE PRACTITIONER

## 2025-04-16 PROCEDURE — 82947 ASSAY GLUCOSE BLOOD QUANT: CPT | Performed by: NURSE PRACTITIONER

## 2025-04-16 RX ORDER — INSULIN LISPRO 100 [IU]/ML
INJECTION, SOLUTION INTRAVENOUS; SUBCUTANEOUS
Qty: 15 ML | Refills: 0 | Status: SHIPPED | OUTPATIENT
Start: 2025-04-16

## 2025-04-16 RX ORDER — DULAGLUTIDE 1.5 MG/.5ML
1.5 INJECTION, SOLUTION SUBCUTANEOUS WEEKLY
Qty: 2 ML | Refills: 3 | Status: SHIPPED | OUTPATIENT
Start: 2025-04-16

## 2025-04-16 RX ORDER — GLUCAGON INJECTION, SOLUTION 1 MG/.2ML
INJECTION, SOLUTION SUBCUTANEOUS
Qty: 0.4 ML | Refills: 1 | Status: SHIPPED | OUTPATIENT
Start: 2025-04-16

## 2025-04-16 RX ORDER — ACYCLOVIR 400 MG/1
1 TABLET ORAL
Qty: 3 EACH | Refills: 3 | Status: SHIPPED | OUTPATIENT
Start: 2025-04-16

## 2025-04-16 NOTE — PROGRESS NOTES
Chivo Miller is a 64 year old presenting today  for type 2 diabetes management.   Primary care physician: Wendi Arechiga DO  Initial consult with me   (A1C at time of consult 9.5%)   Last Diabetes appointment with me  --> restarted trulicity prescription . Denies side effects or missing doses   Has missed a few appointments with me since last follow up         Today's A1C 8.9 ( last A1C 8.9%)   Blood sugar 341 mg/dl--> ate < 30 min ago : eggs, 1 potato/8 - 10 ounce orange juice     Admits he has not tested in > 2 weeks (meter expired_     Reports Blood sugar trends were 140-150 mg/dl when he was testing Blood sugars   Denies hypoglycemia     Still working as  : but has active changes in vision and Cervical radiculopathy symptoms         Diabetes History:  Type 2 DM ~   Patient has not had hospitalizations for blood sugar issues  denies any history of pancreatitis      Previous DM therapies:  Tresiba/Levemir : insurance formulary     Current DM Regimen:  Jardiance 10mg once daily   Lantus solostar 10  units once daily   Metformin ER 500m tab daily in AM    Glipizide 10mg once daily in AM   Trulicity 0.75mg subcutaneous once weekly      HGBA1C:    Lab Results   Component Value Date    A1C 8.9 (A) 2025    A1C 8.9 (A) 2025    A1C 8.3 (A) 2024     (H) 2016       Lab Results   Component Value Date    CHOLEST 234 (H) 2024    CHOLEST 242 (H) 2023    TRIG 94 2024    TRIG 168 (H) 2023    HDL 51 2024    HDL 53 2023     (H) 2024     (H) 2023     Lab Results   Component Value Date    MICROALBCREA  2025      Comment:      Unable to calculate due to Urine Microalbumin <0.3 mg/dL        MICROALBCREA 7.2 2024      Lab Results   Component Value Date    CREATSERUM 1.15 2024    CREATSERUM 1.22 2023    EGFRCR 71 2024    EGFRCR 67 2023     Lab Results   Component Value  Date    AST 25 09/30/2024    AST 18 06/12/2023    ALT 23 09/30/2024    ALT 23 06/12/2023       Lab Results   Component Value Date    TSH 1.544 09/30/2024    TSH 1.720 06/12/2023         DM Complications:  Microvascular:   Neuropathy: yes  Retinopathy: no  Nephropathy: yes    Macrovascular:  PVD: no  CAD: no  Stroke/CVA: no        Modifying factors:  Medication adherence: no see above -   Recent steroids, illness or infections ( past 3m): no     Allergies: Patient has no known allergies.    Past Medical History:    Allergic rhinitis, cause unspecified    Colon polyps    repeat CLN in 2029    Diabetes (HCC)    ED (erectile dysfunction)    Pure hypercholesterolemia    Type 2 diabetes mellitus with hyperglycemia, with long-term current use of insulin (HCC)    Unspecified hearing loss    Vitamin D deficiency     Past Surgical History:   Procedure Laterality Date    Colonoscopy N/A 02/14/2024    Dr. Stockton; diverticulosis, polyps, hemorrhoids    Colonoscopy N/A 2/14/2024    Procedure: COLONOSCOPY;  Surgeon: CHELSEA Stockton MD;  Location: [facility]     Social History     Socioeconomic History    Marital status:    Tobacco Use    Smoking status: Never    Smokeless tobacco: Never   Vaping Use    Vaping status: Never Used   Substance and Sexual Activity    Alcohol use: Yes     Alcohol/week: 0.0 standard drinks of alcohol     Comment: occa    Drug use: Yes     Types: Cannabis     Comment: occ    Sexual activity: Yes   Other Topics Concern     Service No    Blood Transfusions No    Caffeine Concern Yes     Comment: 1 cup daily    Occupational Exposure No    Hobby Hazards No    Sleep Concern No    Stress Concern No    Weight Concern No    Special Diet No    Back Care No    Exercise Yes     Comment: PT excersises at home    Bike Helmet No    Seat Belt No    Self-Exams No     Social Drivers of Health     Food Insecurity: No Food Insecurity (2/7/2025)    NCSS - Food Insecurity     Worried About Running Out of Food in  the Last Year: No     Ran Out of Food in the Last Year: No   Transportation Needs: No Transportation Needs (2/7/2025)    NCSS - Transportation     Lack of Transportation: No   Housing Stability: Not At Risk (2/7/2025)    NCSS - Housing/Utilities     Has Housing: Yes     Worried About Losing Housing: No     Unable to Get Utilities: No     Family History   Problem Relation Age of Onset    Stroke Mother     Other (liver cancer) Father      Current Medication List:   Current Outpatient Medications   Medication Sig Dispense Refill    Dulaglutide (TRULICITY) 1.5 MG/0.5ML Subcutaneous Solution Auto-injector Inject 1.5 mg into the skin once a week. 2 mL 3    Insulin Lispro, 1 Unit Dial, (HUMALOG KWIKPEN) 100 UNIT/ML Subcutaneous Solution Pen-injector 3-5 units before meals based on Blood sugar trends TDD = 15 units 15 mL 0    Continuous Glucose Sensor (DEXCOM G7 SENSOR) Does not apply Misc 1 each Every 10 days. 3 each 3    glucagon (GVOKE HYPOPEN 2-PACK) 1 MG/0.2ML Subcutaneous injection 1mg subcutaneous as needed for severe hypoglycemia 0.4 mL 1    atorvastatin 40 MG Oral Tab Take 1 tablet (40 mg total) by mouth daily. 90 tablet 3    glipiZIDE 10 MG Oral Tab Take 1 tablet (10 mg total) by mouth daily with breakfast. 90 tablet 1    empagliflozin (JARDIANCE) 10 MG Oral Tab Take 1 tablet (10 mg total) by mouth daily. 90 tablet 1    metFORMIN  MG Oral Tablet 24 Hr 2 TAB DAILY IN am 180 tablet 1    insulin glargine (LANTUS SOLOSTAR) 100 UNIT/ML Subcutaneous Solution Pen-injector Up to 15 units daily w dose titration 6 mL 2    gabapentin 100 MG Oral Cap Take 1 capsule (100 mg total) by mouth 3 (three) times daily. 90 capsule 0    tamsulosin 0.4 MG Oral Cap Take 1 capsule (0.4 mg total) by mouth daily. 90 capsule 3    dorzolamide-timolol 2-0.5 % Ophthalmic Solution Place 1 drop into both eyes 2 (two) times daily.      Glucose Blood (ONETOUCH VERIO) In Vitro Strip Use as directed once daily. 100 strip 1    cyclobenzaprine 10  MG Oral Tab Take 1 tablet (10 mg total) by mouth nightly as needed.      ibuprofen 600 MG Oral Tab Take 1 tablet (600 mg total) by mouth every 6 (six) hours as needed for Pain.      Insulin Pen Needle (PEN NEEDLES) 32G X 6 MM Does not apply Misc 1 each at bedtime. Use 1 pen needle with the Lantus every night 100 each 1    Alcohol Swabs 70 % Does not apply Pads Take 1 Bottle by mouth As Directed.      brimonidine 0.2 % Ophthalmic Solution Place 1 drop into the right eye in the morning, at noon, and at bedtime.      Lancets Does not apply Misc Test twice daily 100 each 0    Glucose Blood (TRUETRACK TEST) In Vitro Strip TEST TWICE DAILY 200 each 1           DM associated review of  symptoms:   Endocrine: Polyuria, polyphagia, polydipsia: no  Neurological: Paresthesias: yes LE , + ED   HEENT: Blurred vision: no  Skin: no rash or wounds  Hematological: Hypoglycemia: No       Review of Systems     LUNGS: denies shortness of breath   CARDIOVASCULAR: denies chest pain  GI: denies abdominal pain, nausea or diarrhea   : denies dysuria        Physical exam:  /56   Pulse 74   Resp 16   Wt 176 lb 3.2 oz (79.9 kg)   SpO2 97%   BMI 24.57 kg/m²   Body mass index is 24.57 kg/m².      Physical Exam   Vitals reviewed.  Constitutional: Normal appearance   Cardiovascular: Normal rate   Pulmonary/Chest: Effort normal  Neurological: Alert and oriented .   Psychiatric: Normal mood and affect.            Assessment/Plan:      Dyslipidemia:   Last  labs 9-2024   Needs ongoing monitoring   Continue atorvastatin          Type 2 diabetes mellitus with hyperglycemia, with long-term current use of insulin ([facility])  A1C: 8.9 % ( last A1C  8.9 %)   Weight 176 lb ( last DM center weight: 175 lb  )     Diabetes control is poorly controlled   Reminded  patient health impact associated with high glucose trends and the importance of better glucose control to prevent onset /progression of DM complications.     Despite 5 agents, still poorly  controlled  Check  T1DM antibody panel   Will pursue personal Dexcom G7 - assisted patient w start up , sharon set up and first sensor self applied.   Start Humalog U 100 kwik pen: sliding scale three times daily at meals       If Blood sugar is:     180-240, dose 3 units   241- 280 , dose 4 units   Over 281, dose 5 units       Increase   Lantus solostar:  10 --> 12 units  once daily   Trulicity 0.75mg --> 1.5mg subcutaneous once weekly       Continue    Metformin XR 500mg  2 tab daily   Glipizide 10mg once daily    Jardiance 10mg once daily     Patient was instructed to drink at least 4 eight ounce glasses of water daily to avoid dehydration.  To be mindful of  any rash, itching in genital area or painful urination . Sick day management and avoidance of keto diets reviewed.            Reviewed  clinical significance of A1c, adverse effects of suboptimal glucose control, and goals of therapy   Reviewed the A1C test, what the value reflects and the goal for the patient.   Reminded pt on A1C and blood sugar targets (Fasting < 130 and post prandial <180 ) and complications associated with hyperglycemia and uncontrolled DM (on AVS)   Recommended SMBG 3- x daily if not using CGM   Reviewed s/s and treatment of hypoglycemia (on AVS)   For hypoglycemia emergency, Glucagon rx: GVOKE 1mg (4-2025)   Continue with lifestyle modifications since they have positive impact on diabetes/blood sugars/health (portion control, physical activity, weight loss)   Reinforced timing and adherence with medication, self-monitoring of blood glucose and routine follow up  4-5 week appointment for check in  and asked to return in office 4m  but  recommended to contact DM clinic sooner if questions or concerns.    The patient indicates understanding of these issues and agrees to the plan.      Orders Placed This Encounter    ASSAY QUANTITATIVE, GLUCOSE     Release to patient:   Immediate    POC Hemoglobin A1C     Release to patient:   Immediate     JOHN-65 Autoantibody     Standing Status:   Future     Expected Date:   2025     Expiration Date:   2026    IA-2 Autoantibodies     Standing Status:   Future     Expected Date:   2025     Expiration Date:   2026     Release to patient:   Immediate    Islet Cell Cytoplasmic Antibody, IgG     Standing Status:   Future     Expected Date:   2025     Expiration Date:   2026    Zinc Transporter 8 (ZnT8) Antibodies     Release to patient:   Immediate    Comp Metabolic Panel (14)     Standing Status:   Future     Expected Date:   2025     Expiration Date:   2026    Lipid Panel     Standing Status:   Future     Expected Date:   2025     Expiration Date:   2026    TSH W Reflex To Free T4     Standing Status:   Future     Expected Date:   2025     Expiration Date:   2026     Release to patient:   Immediate    Dulaglutide (TRULICITY) 1.5 MG/0.5ML Subcutaneous Solution Auto-injector     Sig: Inject 1.5 mg into the skin once a week.     Dispense:  2 mL     Refill:  3    Insulin Lispro, 1 Unit Dial, (HUMALOG KWIKPEN) 100 UNIT/ML Subcutaneous Solution Pen-injector     Sig: 3-5 units before meals based on Blood sugar trends TDD = 15 units     Dispense:  15 mL     Refill:  0    Continuous Glucose Sensor (DEXCOM G7 SENSOR) Does not apply Misc     Si each Every 10 days.     Dispense:  3 each     Refill:  3    glucagon (GVOKE HYPOPEN 2-PACK) 1 MG/0.2ML Subcutaneous injection     Simg subcutaneous as needed for severe hypoglycemia     Dispense:  0.4 mL     Refill:  1     Diabetes complications & risks surveillance:   A1C/Blood pressure: as reported    Last dilated eye exam: Last Dilated Eye Exam: 24   Exam shows retinopathy? Eye Exam shows Diabetic Retinopathy?: Yes  Last diabetic foot exam: Last Foot Exam: 25  Nephropathy screening:   no indication for ace /arb rx.    Lab Results   Component Value Date    EGFRCR 71 2024    MICROALBCREA  2025       Comment:      Unable to calculate due to Urine Microalbumin <0.3 mg/dL         LIPID screening:    Lab Results   Component Value Date    CHOLEST 234 (H) 09/30/2024     (H) 09/30/2024    TRIG 94 09/30/2024    HDL 51 09/30/2024    FASTING Yes 09/30/2024    FASTING Yes 09/30/2024     Cholesterol Lowering Medications            atorvastatin 40 MG Oral Tab             Recent appointment w ophthalmologist - hx glaucoma and laser tx --> report pending for PCP     Code selection for this visit was based on time spent (54 min ) on date of service in preparing to see the patient, obtaining and/or reviewing separately obtained history,evaluating patient, reviewing blood glucose trends/patterns, starting personal dexcom , initiating prandial insulin , discussing treatment options performing a medically appropriate examination, counseling and educating the patient/family/caregiver, ordering medications or testing, referring and communicating with other healthcare providers, documenting clinical information in the EHR, independently interpreting results and communicating results to the patient/family/caregiver and care coordination with the patient's other providers.     The risks and benefits of my recommendations, as well as other treatment options were discussed with the patient today. questions were also answered to the best of my knowledge.       The risks and benefits of my recommendations, as well as other treatment options were discussed with the patient today. questions were also answered to the best of my knowledge.

## 2025-04-16 NOTE — PROGRESS NOTES
A continuous glucose sensor for 24 hour blood sugar monitoring was placed on patient today per [facility] order.   Serial NUMBER: [id number]  Exp date: (11/30/25)  - After cleansing skin with alcohol prep, Sensor (Dexcom G7)was inserted on  left Posterior upper arm area without difficulty. Small amount of skin prep was added around sensor tape after placement to help with sensor adhesive.    Area remains free of any bleeding or irritation or pain at site when patient left DM center.  Patient instructions:   Record daily food/drink intake and activity in log book  Call Diabetes center with any questions or concerns.   instructed to record food, exercise, insulin doses (if taking) on log provided

## 2025-04-16 NOTE — PATIENT INSTRUCTIONS
Your blood sugar is 341 mg/dl   Try to limit orange juice to 4 ounces 1-2 x week     Increase   Trulicity 1.5mg once weekly   Increase Lantus 12 units once daily       Start fast acting insulin: called Humalog  This insulin starts to work in 15- 30 minutes and peaks in 1 hour   The Blood sugar lowering effect lasts up to 3-4 hours   It is intended to help after meal Blood sugar spikes.     Please  dose humalog before meals when Blood sugar is over 180     If Blood sugar is:     180-240, dose 3 units   241- 280 , dose 4 units   Over 281, dose 5 units         Continue   Jardiance 10mg once daily   Metformin ER 500m tab daily in AM    Glipizide 10mg once daily in AM         American Diabetes Association: blood sugar targets:     Fasting blood sugar (before breakfast) Target:    (ideally less than 110)  2 hours after eating less than 180 (ideally less than  150 )     Call for blood sugars less than  75 or greater than  200 more than 2 times in a week     If you are wearing the sensor, please look at your trends/averages    Recommendations:   GMI (estimated A1C ) target under  7%     Time in range (healthy blood sugar targets) : goal is over 70%   less than 70 : goal is less than 4%   Over 180 : goal is less than 20 %   Over 250: goal is  less than 5%     Watch for low blood sugars: (less than 70 )    Treatment of Low Blood Glucose Action Plan  1. Check blood glucose to be sure that it is low. You cannot  always go by symptoms or how you feel. If in doubt, treat your low blood glucose anyway.  Rule of 15 :     2. Take 15 grams of carbohydrate (carb). Here are some choices:    4 oz. regular fruit juice  3-4 glucose tablets  6 oz. regular soda   7-8 jelly beans    3. Recheck blood glucose after 10-15 minutes. If blood glucose is still low (less than 70 mg/dl) repeat the treatment (step 2).    4. If your next meal is more than one hour away, eat a small snack.    5. If you’re not sure what caused your low blood  glucose, call your healthcare provider.    6. Always check your blood glucose before you drive       To treat a low, I recommend you carry with you easy, pre-portioned treatment for low blood sugars that are 15G of carbs:   - Children sized squeeze pouch applesauce (high fiber + carbs help prevent too high of a spike)  - Small children's sized juicebox- 15g carb --> 4oz juice box  - Glucose tablets from [employer], you can find them near diabetes supplies --> Note, you will need to eat 3-4 tablets to get to 15g of carbs  - Children sized fruit snack pack- look for one with 15 grams of total carbohydrate    AVOID complex carbs, or foods that contain fats along with carbs (like chocolate) can slow the absorption of glucose and should NOT be used to treat an emergency low            I have ordered you Gvoke. This is to be used in emergency situations only. Below are instructions from the Gvoke website on how to use, when to use

## 2025-04-29 ENCOUNTER — TELEPHONE (OUTPATIENT)
Facility: CLINIC | Age: 65
End: 2025-04-29

## 2025-04-29 DIAGNOSIS — E11.65 TYPE 2 DIABETES MELLITUS WITH HYPERGLYCEMIA, WITH LONG-TERM CURRENT USE OF INSULIN (HCC): Primary | ICD-10-CM

## 2025-04-29 DIAGNOSIS — Z79.4 TYPE 2 DIABETES MELLITUS WITH HYPERGLYCEMIA, WITH LONG-TERM CURRENT USE OF INSULIN (HCC): Primary | ICD-10-CM

## 2025-04-29 PROCEDURE — 95251 CONT GLUC MNTR ANALYSIS I&R: CPT | Performed by: NURSE PRACTITIONER

## 2025-04-29 NOTE — TELEPHONE ENCOUNTER
Checked dexcom   Started prandial insulin last appointment   Last A1c value was 8.9% done 4/16/2025.     Dexcom reviewed  GMI 8.7% and 14 d average 226 mg/dl   Time in Range: 28%       Recommendations   Confirm patient is dosing humalog since trends are higher   Also dexcom was ordered at pharmacy ; picked up 4- ;encourage continued use.         Humalog U 100 kwik pen: sliding scale three times daily at meals         If Blood sugar is:      180-240, dose 3 units   241- 280 , dose 4 units   Over 281, dose 5 units         Increase   Lantus solostar:  12 --> 15 units  once daily           Continue    Metformin XR 500mg  2 tab daily   Glipizide 10mg once daily    Jardiance 10mg once daily   Trulicity  1.5mg subcutaneous once weekly

## 2025-04-29 NOTE — TELEPHONE ENCOUNTER
Call placed to patient to discuss - per [facility] release do not leave detailed voicemail - advised patient to respond to My Chart Message or call back.    My Chart Message sent

## 2025-05-01 NOTE — TELEPHONE ENCOUNTER
Contacted patient, reviewed APRN recommendations.  Patient will increase Lantus to 15 units once daily.  Patient states he has not used humalog, we did review scale and patient verbalized understanding.  He will continue all other diabetes medications as directed.  Patient had no further questions at this time.

## 2025-05-07 ENCOUNTER — OFFICE VISIT (OUTPATIENT)
Dept: ENDOCRINOLOGY CLINIC | Facility: CLINIC | Age: 65
End: 2025-05-07
Payer: MEDICAID

## 2025-05-07 ENCOUNTER — LAB ENCOUNTER (OUTPATIENT)
Dept: LAB | Age: 65
End: 2025-05-07
Attending: NURSE PRACTITIONER
Payer: MEDICAID

## 2025-05-07 VITALS
SYSTOLIC BLOOD PRESSURE: 102 MMHG | RESPIRATION RATE: 16 BRPM | WEIGHT: 176.81 LBS | OXYGEN SATURATION: 96 % | DIASTOLIC BLOOD PRESSURE: 60 MMHG | HEART RATE: 87 BPM | BODY MASS INDEX: 25 KG/M2

## 2025-05-07 DIAGNOSIS — Z79.4 TYPE 2 DIABETES MELLITUS WITH HYPERGLYCEMIA, WITH LONG-TERM CURRENT USE OF INSULIN (HCC): Primary | ICD-10-CM

## 2025-05-07 DIAGNOSIS — E11.65 TYPE 2 DIABETES MELLITUS WITH HYPERGLYCEMIA, WITH LONG-TERM CURRENT USE OF INSULIN (HCC): ICD-10-CM

## 2025-05-07 DIAGNOSIS — E78.2 MIXED HYPERLIPIDEMIA: ICD-10-CM

## 2025-05-07 DIAGNOSIS — Z79.4 TYPE 2 DIABETES MELLITUS WITH HYPERGLYCEMIA, WITH LONG-TERM CURRENT USE OF INSULIN (HCC): ICD-10-CM

## 2025-05-07 DIAGNOSIS — E11.65 TYPE 2 DIABETES MELLITUS WITH HYPERGLYCEMIA, WITH LONG-TERM CURRENT USE OF INSULIN (HCC): Primary | ICD-10-CM

## 2025-05-07 LAB
ALBUMIN SERPL-MCNC: 4.9 G/DL (ref 3.2–4.8)
ALBUMIN/GLOB SERPL: 2.1 {RATIO} (ref 1–2)
ALP LIVER SERPL-CCNC: 67 U/L (ref 45–117)
ALT SERPL-CCNC: 11 U/L (ref 10–49)
ANION GAP SERPL CALC-SCNC: 12 MMOL/L (ref 0–18)
AST SERPL-CCNC: 19 U/L (ref ?–34)
BILIRUB SERPL-MCNC: 0.5 MG/DL (ref 0.2–1.1)
BUN BLD-MCNC: 16 MG/DL (ref 9–23)
CALCIUM BLD-MCNC: 9.1 MG/DL (ref 8.7–10.6)
CHLORIDE SERPL-SCNC: 103 MMOL/L (ref 98–112)
CHOLEST SERPL-MCNC: 121 MG/DL (ref ?–200)
CO2 SERPL-SCNC: 26 MMOL/L (ref 21–32)
CREAT BLD-MCNC: 1.35 MG/DL (ref 0.7–1.3)
EGFRCR SERPLBLD CKD-EPI 2021: 59 ML/MIN/1.73M2 (ref 60–?)
FASTING PATIENT LIPID ANSWER: NO
FASTING STATUS PATIENT QL REPORTED: NO
GLOBULIN PLAS-MCNC: 2.3 G/DL (ref 2–3.5)
GLUCOSE BLD-MCNC: 165 MG/DL (ref 70–99)
HDLC SERPL-MCNC: 43 MG/DL (ref 40–59)
HEMOGLOBIN A1C: 8.2 % (ref 4.3–5.6)
LDLC SERPL CALC-MCNC: 58 MG/DL (ref ?–100)
NONHDLC SERPL-MCNC: 78 MG/DL (ref ?–130)
OSMOLALITY SERPL CALC.SUM OF ELEC: 297 MOSM/KG (ref 275–295)
POTASSIUM SERPL-SCNC: 4.6 MMOL/L (ref 3.5–5.1)
PROT SERPL-MCNC: 7.2 G/DL (ref 5.7–8.2)
SODIUM SERPL-SCNC: 141 MMOL/L (ref 136–145)
TRIGL SERPL-MCNC: 111 MG/DL (ref 30–149)
TSI SER-ACNC: 1.08 UIU/ML (ref 0.55–4.78)
VLDLC SERPL CALC-MCNC: 16 MG/DL (ref 0–30)

## 2025-05-07 PROCEDURE — 80061 LIPID PANEL: CPT

## 2025-05-07 PROCEDURE — 86341 ISLET CELL ANTIBODY: CPT

## 2025-05-07 PROCEDURE — 80053 COMPREHEN METABOLIC PANEL: CPT

## 2025-05-07 PROCEDURE — 84443 ASSAY THYROID STIM HORMONE: CPT

## 2025-05-07 PROCEDURE — 36415 COLL VENOUS BLD VENIPUNCTURE: CPT

## 2025-05-07 NOTE — PATIENT INSTRUCTIONS
Your trends are doing better     For you constipation:     Try over the counter Colace or Dulcolax (follow package instructions)   Ok to use [facility] brand    Try to avoid going more then 2 days without a bowel movement     I would take daily fiber laxative such as Metamucil or Citrucel -this will help keep your bowel movements regular       Be sure to drink at least 4 glasses of water -       Continue   Dexcom sensor   Jardiance 10mg once daily   Lantus solostar 15  units once daily     Metformin ER 500m tab daily in AM    Glipizide 10mg once daily in AM   Trulicity 1.5mg subcutaneous once weekly        Humalog U 100 kwik pen:  4 units w dinner daily     Use the Humalog with Breakfast/lunch       If Blood sugar is:     180-240, dose 3 units   241- 280 , dose 4 units   Over 281, dose 5 units     Fluctuations in blood sugars are best detected by testing your sugar with your meter.   In order for me to determine any patterns in your blood sugars, you will need to test your blood sugar 1- 2 times daily     It would be best to change up the times of day that you are testing your sugar.   Always test before breakfast (fasting) and then alternate testing blood sugar 1-2 hours after your meals.     American Diabetes Association: blood sugar targets:     Fasting blood sugar (before breakfast) Target:    (ideally less than 110)  2 hours after eating less than 180 (ideally less than  150 )     Call for blood sugars less than  75 or greater than  200 more than 2 times in a week     If you are wearing the sensor, please look at your trends/averages    Recommendations:   GMI (estimated A1C ) target under  7%     Time in range (healthy blood sugar targets) : goal is over 70%   less than 70 : goal is less than 4%   Over 180 : goal is less than 20 %   Over 250: goal is  less than 5%     Watch for low blood sugars: (less than 70 )    Treatment of Low Blood Glucose Action Plan  1. Check blood glucose to be sure that it is  low. You cannot  always go by symptoms or how you feel. If in doubt, treat your low blood glucose anyway.  Rule of 15 :     2. Take 15 grams of carbohydrate (carb). Here are some choices:    4 oz. regular fruit juice  3-4 glucose tablets  6 oz. regular soda   7-8 jelly beans    3. Recheck blood glucose after 10-15 minutes. If blood glucose is still low (less than 70 mg/dl) repeat the treatment (step 2).    4. If your next meal is more than one hour away, eat a small snack.    5. If you’re not sure what caused your low blood glucose, call your healthcare provider.    6. Always check your blood glucose before you drive       To treat a low, I recommend you carry with you easy, pre-portioned treatment for low blood sugars that are 15G of carbs:   - Children sized squeeze pouch applesauce (high fiber + carbs help prevent too high of a spike)  - Small children's sized juicebox- 15g carb --> 4oz juice box  - Glucose tablets from [employer], you can find them near diabetes supplies --> Note, you will need to eat 3-4 tablets to get to 15g of carbs  - Children sized fruit snack pack- look for one with 15 grams of total carbohydrate    AVOID complex carbs, or foods that contain fats along with carbs (like chocolate) can slow the absorption of glucose and should NOT be used to treat an emergency low

## 2025-05-07 NOTE — PROGRESS NOTES
Chief Complaint   Patient presents with    Diabetes     Follow up streaming Dexcom       Chivo Miller is a 64 year old presenting today  for type 2 diabetes management.   Primary care physician: Wendi Arechiga,   Initial consult with me   (A1C at time of consult 9.5%)   Last Diabetes appointment with me 4. -> increased trulicity, started humalog insulin and Dexcom G7   Admits having issues constipation --> has not had full BM in over 2 days.   Denies abd pain. Just feels bloated.   Denies symptoms associated with increase in trulicity ; admits this can happen on occasion. He has OTC colace and metamucil he will try       Also admits lower back pain still continues but back to work. Using NSAID and topical creams to relieve pain   No increase in pain . Localized to lwer back         Today's A1C 8.2   ( last A1C 8.9%)   GMI 7.4%   Started dexcom  Reviewed CGM report/trends w patient today:  Dexcom (full download in media)   Sensor active time: 84 %    2 week GMI 7.4 %    Average glucose: 171 mg/dl     Hypoglycemia 0%    Time in Range  64 %  (ADA recommended goal > 70%)   Variability WNL ( < 33%)   Highest trends after 12MN; dinner is 11p - 1 am   Has not  used Humalog -       Diabetes History:  Type 2 DM ~   Patient has not had hospitalizations for blood sugar issues  denies any history of pancreatitis      Previous DM therapies:  Tresiba/Levemir : insurance formulary     Current DM Regimen:  Jardiance 10mg once daily   Lantus solostar 15  units once daily   Metformin ER 500m tab daily in AM    Glipizide 10mg once daily in AM   Trulicity 1.5mg subcutaneous once weekly    Humalog U 100 kwik pen: sliding scale three times daily at meals (not started)       If Blood sugar is:     180-240, dose 3 units   241- 280 , dose 4 units   Over 281, dose 5 units     HGBA1C:    Lab Results   Component Value Date    A1C 8.2 (A) 2025    A1C 8.9 (A) 2025    A1C 8.9 (A) 2025     (H)  11/16/2016       Lab Results   Component Value Date    CHOLEST 234 (H) 09/30/2024    CHOLEST 242 (H) 06/12/2023    TRIG 94 09/30/2024    TRIG 168 (H) 06/12/2023    HDL 51 09/30/2024    HDL 53 06/12/2023     (H) 09/30/2024     (H) 06/12/2023     Lab Results   Component Value Date    MICROALBCREA  02/07/2025      Comment:      Unable to calculate due to Urine Microalbumin <0.3 mg/dL        MICROALBCREA 7.2 01/31/2024      Lab Results   Component Value Date    CREATSERUM 1.15 09/30/2024    CREATSERUM 1.22 06/12/2023    EGFRCR 71 09/30/2024    EGFRCR 67 06/12/2023     Lab Results   Component Value Date    AST 25 09/30/2024    AST 18 06/12/2023    ALT 23 09/30/2024    ALT 23 06/12/2023       Lab Results   Component Value Date    TSH 1.544 09/30/2024    TSH 1.720 06/12/2023         DM Complications:  Microvascular:   Neuropathy: yes  Retinopathy: no  Nephropathy: yes    Macrovascular:  PVD: no  CAD: no  Stroke/CVA: no        Modifying factors:  Medication adherence: improving. See above   Recent steroids, illness or infections ( past 3m): no     Allergies: Patient has no known allergies.    Past Medical History:    Allergic rhinitis, cause unspecified    Colon polyps    repeat CLN in 2029    Diabetes (HCC)    ED (erectile dysfunction)    Pure hypercholesterolemia    Type 2 diabetes mellitus with hyperglycemia, with long-term current use of insulin (HCC)    Unspecified hearing loss    Vitamin D deficiency     Past Surgical History:   Procedure Laterality Date    Colonoscopy N/A 02/14/2024    Dr. Stockton; diverticulosis, polyps, hemorrhoids    Colonoscopy N/A 2/14/2024    Procedure: COLONOSCOPY;  Surgeon: CHELSEA Stockton MD;  [facility]: [facility]     Social History     Socioeconomic History    Marital status:    Tobacco Use    Smoking status: Never    Smokeless tobacco: Never   Vaping Use    Vaping status: Never Used   Substance and Sexual Activity    Alcohol use: Yes     Alcohol/week: 0.0 standard drinks  of alcohol     Comment: occa    Drug use: Yes     Types: Cannabis     Comment: occ    Sexual activity: Yes   Other Topics Concern     Service No    Blood Transfusions No    Caffeine Concern Yes     Comment: 1 cup daily    Occupational Exposure No    Hobby Hazards No    Sleep Concern No    Stress Concern No    Weight Concern No    Special Diet No    Back Care No    Exercise Yes     Comment: PT excersises at home    Bike Helmet No    Seat Belt No    Self-Exams No     Social Drivers of Health     Food Insecurity: No Food Insecurity (2/7/2025)    NCSS - Food Insecurity     Worried About Running Out of Food in the Last Year: No     Ran Out of Food in the Last Year: No   Transportation Needs: No Transportation Needs (2/7/2025)    NCSS - Transportation     Lack of Transportation: No   Housing Stability: Not At Risk (2/7/2025)    NCSS - Housing/Utilities     Has Housing: Yes     Worried About Losing Housing: No     Unable to Get Utilities: No     Family History   Problem Relation Age of Onset    Stroke Mother     Other (liver cancer) Father      Current Medication List:   Current Outpatient Medications   Medication Sig Dispense Refill    Dulaglutide (TRULICITY) 1.5 MG/0.5ML Subcutaneous Solution Auto-injector Inject 1.5 mg into the skin once a week. 2 mL 3    Insulin Lispro, 1 Unit Dial, (HUMALOG KWIKPEN) 100 UNIT/ML Subcutaneous Solution Pen-injector 3-5 units before meals based on Blood sugar trends TDD = 15 units 15 mL 0    atorvastatin 40 MG Oral Tab Take 1 tablet (40 mg total) by mouth daily. 90 tablet 3    tamsulosin 0.4 MG Oral Cap Take 1 capsule (0.4 mg total) by mouth daily. 90 capsule 3    glipiZIDE 10 MG Oral Tab Take 1 tablet (10 mg total) by mouth daily with breakfast. 90 tablet 1    empagliflozin (JARDIANCE) 10 MG Oral Tab Take 1 tablet (10 mg total) by mouth daily. 90 tablet 1    metFORMIN  MG Oral Tablet 24 Hr 2 TAB DAILY IN am 180 tablet 1    insulin glargine (LANTUS SOLOSTAR) 100 UNIT/ML  Subcutaneous Solution Pen-injector Up to 15 units daily w dose titration 6 mL 2    gabapentin 100 MG Oral Cap Take 1 capsule (100 mg total) by mouth 3 (three) times daily. 90 capsule 0    Continuous Glucose Sensor (DEXCOM G7 SENSOR) Does not apply Misc 1 each Every 10 days. 3 each 3    glucagon (GVOKE HYPOPEN 2-PACK) 1 MG/0.2ML Subcutaneous injection 1mg subcutaneous as needed for severe hypoglycemia 0.4 mL 1    dorzolamide-timolol 2-0.5 % Ophthalmic Solution Place 1 drop into both eyes 2 (two) times daily.      Glucose Blood (ONETOUCH VERIO) In Vitro Strip Use as directed once daily. 100 strip 1    cyclobenzaprine 10 MG Oral Tab Take 1 tablet (10 mg total) by mouth nightly as needed.      ibuprofen 600 MG Oral Tab Take 1 tablet (600 mg total) by mouth every 6 (six) hours as needed for Pain.      Insulin Pen Needle (PEN NEEDLES) 32G X 6 MM Does not apply Misc 1 each at bedtime. Use 1 pen needle with the Lantus every night 100 each 1    Alcohol Swabs 70 % Does not apply Pads Take 1 Bottle by mouth As Directed.      brimonidine 0.2 % Ophthalmic Solution Place 1 drop into the right eye in the morning, at noon, and at bedtime.      Lancets Does not apply Misc Test twice daily 100 each 0    Glucose Blood (TRUETRACK TEST) In Vitro Strip TEST TWICE DAILY 200 each 1           DM associated review of  symptoms:   Endocrine: Polyuria, polyphagia, polydipsia: no  Neurological: Paresthesias: yes LE , + ED   HEENT: Blurred vision: no  Skin: no rash or wounds  Hematological: Hypoglycemia: No       Review of Systems     LUNGS: denies shortness of breath   CARDIOVASCULAR: denies chest pain  GI: denies abdominal pain, nausea or diarrhea    + constipation   : denies dysuria        Physical exam:  /60   Pulse 87   Resp 16   Wt 176 lb 12.8 oz (80.2 kg)   SpO2 96%   BMI 24.66 kg/m²   Body mass index is 24.66 kg/m².      Physical Exam   Vitals reviewed.  Constitutional: Normal appearance   Cardiovascular: Normal rate    Pulmonary/Chest: Effort normal  Neurological: Alert and oriented .   Psychiatric: Normal mood and affect.            Assessment/Plan:    Dyslipidemia   Cholesterol: 234, done on 9/30/2024.  HDL Cholesterol: 51, done on 9/30/2024.  TriGlycerides 94, done on 9/30/2024.  LDL Cholesterol: 166, done on 9/30/2024.      Needs ongoing monitoring --> reminded to update lab   Continue atorvastatin    Reminded patient increased CV burden with high lipids and T2DM           Type 2 diabetes mellitus with hyperglycemia, with long-term current use of insulin ([facility])  A1C: 8.2  % ( last A1C  8.9 %)   Weight 176 lb ( last DM center weight: 175 lb  )     Diabetes control is improving   Needs ongoing management and evaluation  given the chronicity of Diabetes, ongoing medication monitoring and risk for complications     Reminded  patient health impact associated with high glucose trends and the importance of better glucose control to prevent onset /progression of DM complications.     Despite 6 agents, stil sub optimal control   Ordered T1DM antibody panel --> reminded to update    Recommneded : Humalog U 100 kwik pen: 4 units at dinner meal   Use sliding scale for B/L meals   If Blood sugar is:     180-240, dose 3 units   241- 280 , dose 4 units   Over 281, dose 5 units   Continue      Dexcom G7 -   Lantus solostar:  15 units  once daily   Trulicity 1.5mg subcutaneous once weekly    Metformin XR 500mg  2 tab daily   Glipizide 10mg once daily    Jardiance 10mg once daily     Patient was instructed to drink at least 4 eight ounce glasses of water daily to avoid dehydration.  To be mindful of  any rash, itching in genital area or painful urination . Sick day management and avoidance of keto diets reviewed.            Reviewed  clinical significance of A1c, adverse effects of suboptimal glucose control, and goals of therapy   Reviewed the A1C test, what the value reflects and the goal for the patient.   Reminded pt on A1C and blood sugar  targets (Fasting < 130 and post prandial <180 ) and complications associated with hyperglycemia and uncontrolled DM (on AVS)   Recommended SMBG 3- x daily if not using CGM   Reviewed s/s and treatment of hypoglycemia (on AVS)   For hypoglycemia emergency, Glucagon rx: GVOKE 1mg (4-2025)   Continue with lifestyle modifications since they have positive impact on diabetes/blood sugars/health (portion control, physical activity, weight loss)   Reinforced timing and adherence with medication, self-monitoring of blood glucose and routine follow up  Dexcom check in : 4-5 week  Follow up 3 m in office but  recommended to contact DM clinic sooner if questions or concerns.    The patient indicates understanding of these issues and agrees to the plan.      Orders Placed This Encounter    POC Hemoglobin A1C     Release to patient:   Immediate    Interpretation code 72 hour glucose monitor     Diabetes complications & risks surveillance:   A1C/Blood pressure: as reported    Last dilated eye exam: Last Dilated Eye Exam: 06/12/24   Exam shows retinopathy? Eye Exam shows Diabetic Retinopathy?: Yes  Last diabetic foot exam: Last Foot Exam: 02/07/25  Nephropathy screening:   no indication for ace /arb rx.    Lab Results   Component Value Date    EGFRCR 71 09/30/2024    MICROALBCREA  02/07/2025      Comment:      Unable to calculate due to Urine Microalbumin <0.3 mg/dL         LIPID screening:    Lab Results   Component Value Date    CHOLEST 234 (H) 09/30/2024     (H) 09/30/2024    TRIG 94 09/30/2024    HDL 51 09/30/2024    FASTING Yes 09/30/2024    FASTING Yes 09/30/2024     Cholesterol Lowering Medications            atorvastatin 40 MG Oral Tab                 The risks and benefits of my recommendations, as well as other treatment options were discussed with the patient today. questions were also answered to the best of my knowledge.        Hide Neutrogena Products: No Treatment 1: spironolactone Action 4: Continue Action 2: Start Treatment 2: Epiduo Forte Sig For Treatment 1 (If Needed): twice daily Detail Level: Zone Sig For Treatment 2 (If Needed): once daily at bedtime

## 2025-05-09 LAB — PANCREATIC ISLET CELLS: NEGATIVE

## 2025-05-13 LAB — GAD-65: <5 U/ML

## 2025-05-15 LAB — ZNT8 ABS: <15 U/ML

## 2025-05-17 LAB — IA-2 AUTOABS: <7.5 U/ML

## 2025-06-04 ENCOUNTER — TELEPHONE (OUTPATIENT)
Dept: ENDOCRINOLOGY CLINIC | Facility: CLINIC | Age: 65
End: 2025-06-04

## 2025-06-04 NOTE — TELEPHONE ENCOUNTER
Received chronic condition release form from [facility] ron[facility]     Called patient to confirm this is something this request patient states no he does not want that     Wrote on form \"patient declined\" and faxed too [phone]

## 2025-06-30 ENCOUNTER — TELEPHONE (OUTPATIENT)
Facility: CLINIC | Age: 65
End: 2025-06-30

## 2025-06-30 LAB — AMB EXT GMI: 7.3 %

## 2025-07-29 DIAGNOSIS — E78.2 MIXED HYPERLIPIDEMIA: ICD-10-CM

## 2025-07-29 RX ORDER — DULAGLUTIDE 1.5 MG/.5ML
1.5 INJECTION, SOLUTION SUBCUTANEOUS WEEKLY
Qty: 2 ML | Refills: 3 | Status: SHIPPED | OUTPATIENT
Start: 2025-07-29

## 2025-07-29 RX ORDER — ATORVASTATIN CALCIUM 40 MG/1
40 TABLET, FILM COATED ORAL DAILY
Qty: 90 TABLET | Refills: 3 | Status: SHIPPED | OUTPATIENT
Start: 2025-07-29

## 2025-07-29 RX ORDER — INSULIN LISPRO 100 [IU]/ML
INJECTION, SOLUTION INTRAVENOUS; SUBCUTANEOUS
Qty: 15 ML | Refills: 0 | Status: SHIPPED | OUTPATIENT
Start: 2025-07-29

## 2025-07-29 RX ORDER — ACYCLOVIR 400 MG/1
1 TABLET ORAL
Qty: 3 EACH | Refills: 3 | Status: SHIPPED | OUTPATIENT
Start: 2025-07-29

## 2025-08-06 DIAGNOSIS — R39.16 BENIGN PROSTATIC HYPERPLASIA (BPH) WITH STRAINING ON URINATION: ICD-10-CM

## 2025-08-06 DIAGNOSIS — N40.1 BENIGN PROSTATIC HYPERPLASIA (BPH) WITH STRAINING ON URINATION: ICD-10-CM

## 2025-08-06 DIAGNOSIS — E11.65 TYPE 2 DIABETES MELLITUS WITH HYPERGLYCEMIA, WITH LONG-TERM CURRENT USE OF INSULIN (HCC): ICD-10-CM

## 2025-08-06 DIAGNOSIS — Z79.4 TYPE 2 DIABETES MELLITUS WITH HYPERGLYCEMIA, WITH LONG-TERM CURRENT USE OF INSULIN (HCC): ICD-10-CM

## 2025-08-06 RX ORDER — METFORMIN HYDROCHLORIDE 500 MG/1
TABLET, EXTENDED RELEASE ORAL
Qty: 180 TABLET | Refills: 0 | Status: SHIPPED | OUTPATIENT
Start: 2025-08-06

## 2025-08-06 RX ORDER — GLIPIZIDE 10 MG/1
10 TABLET ORAL
Qty: 90 TABLET | Refills: 0 | Status: SHIPPED | OUTPATIENT
Start: 2025-08-06

## 2025-08-07 ENCOUNTER — TELEPHONE (OUTPATIENT)
Facility: CLINIC | Age: 65
End: 2025-08-07

## 2025-08-07 RX ORDER — TAMSULOSIN HYDROCHLORIDE 0.4 MG/1
0.4 CAPSULE ORAL DAILY
Qty: 90 CAPSULE | Refills: 3 | Status: SHIPPED | OUTPATIENT
Start: 2025-08-07

## (undated) DEVICE — SYRINGE REGULAR TIP 60ML

## (undated) DEVICE — Device

## (undated) DEVICE — KIT ENDO ORCAPOD 160/180/190

## (undated) DEVICE — CANNULA NASAL ADULT PIGTAIL L7

## (undated) DEVICE — KIT CLEAN ENDOKIT 1.1OZ GOWNX2

## (undated) DEVICE — TUBING SCT CLR 6FT .25IN MDVC

## (undated) NOTE — LETTER
Philomena Aguayo   [address] [phone]          Dear Sophie Fowler ,     Being proactive in the management of your health will help you to achieve and maintain a positive quality of life as well as help minimize complications f

## (undated) NOTE — LETTER
[facility]  Administration of Anesthesia  Chivo GONSALES agree to be cared for by a physician anesthesiologist alone and/or with a nurse anesthetist, who is specially trained to monitor me and give me medicine to put me to sleep or keep me comfortable during my procedure    I understand that my anesthesiologist and/or anesthetist is not an employee or agent of [facility] or [employer]. He or she works for [facility]    As the patient asking for anesthesia services, I agree to:  Allow the anesthesiologist (anesthesia doctor) to give me medicine and do additional procedures as necessary. Some examples are: Starting or using an “IV” to give me medicine, fluids or blood during my procedure, and having a breathing tube placed to help me breathe when I’m asleep (intubation). In the event that my heart stops working properly, I understand that my anesthesiologist will make every effort to sustain my life, unless otherwise directed by [facility] Do Not Resuscitate documents.  Tell my anesthesia doctor before my procedure:  If I am pregnant.  The last time that I ate or drank.  iii. All of the medicines I take (including prescriptions, herbal supplements, and pills I can buy without a prescription (including street drugs/illegal medications). Failure to inform my anesthesiologist about these medicines may increase my risk of anesthetic complications.  iv.If I am allergic to anything or have had a reaction to anesthesia before.  I understand how the anesthesia medicine will help me (benefits).  I understand that with any type of anesthesia medicine there are risks:  The most common risks are: nausea, vomiting, sore throat, muscle soreness, damage to my eyes, mouth, or teeth (from breathing tube placement).  Rare risks include: remembering what happened during my procedure, allergic reactions to medications, injury to my airway, heart, lungs, vision, nerves, or  muscles and in extremely rare instances death.  My doctor has explained to me other choices available to me for my care (alternatives).  Pregnant Patients (“epidural”):  I understand that the risks of having an epidural (medicine given into my back to help control pain during labor), include itching, low blood pressure, difficulty urinating, headache or slowing of the baby’s heart. Very rare risks include infection, bleeding, seizure, irregular heart rhythms and nerve injury.  Regional Anesthesia (“spinal”, “epidural”, & “nerve blocks”):  I understand that rare but potential complications include headache, bleeding, infection, seizure, irregular heart rhythms, and nerve injury.    _____________________________________________________________________________  Patient (or Representative) Signature/Relationship to Patient  Date   Time    _____________________________________________________________________________   Name (if used)    Language/Organization   Time    _____________________________________________________________________________  Nurse Anesthetist Signature     Date   Time  _____________________________________________________________________________  Anesthesiologist Signature     Date   Time  I have discussed the procedure and information above with the patient (or patient’s representative) and answered their questions. The patient or their representative has agreed to have anesthesia services.    _____________________________________________________________________________  Witness        Date   Time  I have verified that the signature is that of the patient or patient’s representative, and that it was signed before the procedure  Patient Name: Chivo Miller     : 6/15/1960                 Printed: 2024 at 8:43 AM    Medical Record #: [id number]                                            Page 1 of 1  ----------ANESTHESIA CONSENT----------

## (undated) NOTE — Clinical Note
At goal for A1C, had eye exam still needs recheck lipid, cmp micro but can't afford.  Asked to see you for PE

## (undated) NOTE — Clinical Note
Hasn't had labs, no readings, hard to manage with no data, stated he had eye exam will try to figure out where, he agrees to test and return post labs in 6 weeks is going to [facility] clinic in [place] for ED/testosterone issues.  Thanks

## (undated) NOTE — LETTER
Date: 5/20/2024    Patient Name: Chivo Miller          To Whom it may concern:    This letter has been written at the patient's request. The above patient was seen at [facility] for treatment of a medical condition.    This patient should be excused from attending work/school 5/20/2024.    The patient may return to work/school on 5/21/2024        Sincerely,    Wendi Arechiga DO

## (undated) NOTE — LETTER
[facility]  [address], [place], [place]  Authorization for Surgical Operation and Procedure                                                                                           I hereby authorize CHELSEA Stockton MD, my physician and his/her assistants (if applicable), which may include medical students, residents, and/or fellows, to perform the following surgical operation/ procedure and administer such anesthesia as may be determined necessary by my physician: Operation/Procedure name (s) COLONOSCOPY on Chivo Miller   2.   I recognize that during the surgical operation/procedure, unforeseen conditions may necessitate additional or different procedures than those listed above.  I, therefore, further authorize and request that the above-named surgeon, assistants, or designees perform such procedures as are, in their judgment, necessary and desirable.    3.   My surgeon/physician has discussed prior to my surgery the potential benefits, risks and side effects of this procedure; the likelihood of achieving goals; and potential problems that might occur during recuperation.  They also discussed reasonable alternatives to the procedure, including risks, benefits, and side effects related to the alternatives and risks related to not receiving this procedure.  I have had all my questions answered and I acknowledge that no guarantee has been made as to the result that may be obtained.    4.   Should the need arise during my operation/procedure, which includes change of level of care prior to discharge, I also consent to the administration of blood and/or blood products.  Further, I understand that despite careful testing and screening of blood or blood products by collecting agencies, I may still be subject to ill effects as a result of receiving a blood transfusion and/or blood products.  The following are some, but not all, of the potential risks that can occur: fever and allergic  reactions, hemolytic reactions, transmission of diseases such as Hepatitis, AIDS and Cytomegalovirus (CMV) and fluid overload.  In the event that I wish to have an autologous transfusion of my own blood, or a directed donor transfusion, I will discuss this with my physician.  Check only if Refusing Blood or Blood Products  I understand refusal of blood or blood products as deemed necessary by my physician may have serious consequences to my condition to include possible death. I hereby assume responsibility for my refusal and release the hospital, its personnel, and my physicians from any responsibility for the consequences of my refusal.    o  Refuse   5.   I authorize the use of any specimen, organs, tissues, body parts or foreign objects that may be removed from my body during the operation/procedure for diagnosis, research or teaching purposes and their subsequent disposal by hospital authorities.  I also authorize the release of specimen test results and/or written reports to my treating physician on the hospital medical staff or other referring or consulting physicians involved in my care, at the discretion of the Pathologist or my treating physician.    6.   I consent to the photographing or videotaping of the operations or procedures to be performed, including appropriate portions of my body for medical, scientific, or educational purposes, provided my identity is not revealed by the pictures or by descriptive texts accompanying them.  If the procedure has been photographed/videotaped, the surgeon will obtain the original picture, image, videotape or CD.  The hospital will not be responsible for storage, release or maintenance of the picture, image, tape or CD.    7.   I consent to the presence of a  or observers in the operating room as deemed necessary by my physician or their designees.    8.   I recognize that in the event my procedure results in extended X-Ray/fluoroscopy time, I may  develop a skin reaction.    9. If I have a Do Not Attempt Resuscitation (DNAR) order in place, that status will be suspended while in the operating room, procedural suite, and during the recovery period unless otherwise explicitly stated by me (or a person authorized to consent on my behalf). The surgeon or my attending physician will determine when the applicable recovery period ends for purposes of reinstating the DNAR order.  10. Patients having a sterilization procedure: I understand that if the procedure is successful the results will be permanent and it will therefore be impossible for me to inseminate, conceive, or bear children.  I also understand that the procedure is intended to result in sterility, although the result has not been guaranteed.   11. I acknowledge that my physician has explained sedation/analgesia administration to me including the risk and benefits I consent to the administration of sedation/analgesia as may be necessary or desirable in the judgment of my physician.    I CERTIFY THAT I HAVE READ AND FULLY UNDERSTAND THE ABOVE CONSENT TO OPERATION and/or OTHER PROCEDURE.     _________________________________________ _________________________________     ___________________________________  Signature of Patient     Signature of Responsible Person                   Printed Name of Responsible Person                              _________________________________________ ______________________________        ___________________________________  Signature of Witness         Date  Time         Relationship to Patient    STATEMENT OF PHYSICIAN My signature below affirms that prior to the time of the procedure; I have explained to the patient and/or his/her legal representative, the risks and benefits involved in the proposed treatment and any reasonable alternative to the proposed treatment. I have also explained the risks and benefits involved in refusal of the proposed treatment and alternatives  to the proposed treatment and have answered the patient's questions. If I have a significant financial interest in a co-management agreement or a significant financial interest in any product or implant, or other significant relationship used in this procedure/surgery, I have disclosed this and had a discussion with my patient.     _______________________________________________________________ _____________________________  (Signature of Physician)                                                                                         (Date)                                   (Time)  Patient Name: Chivo Miller    : 6/15/1960   Printed: 2024      Medical Record #: [id number]                                              Page 1 of 1

## (undated) NOTE — MR AVS SNAPSHOT
[facility],[address], [address]  [phone]               Thank you for choosing us for your health care visit with David Barboza NP.   We are glad to serve you and happy to provide you with this Commonly known as:  GLUCOPHAGE           Sildenafil Citrate 50 MG Tabs   Take 1 tablet by mouth daily as needed for Erectile Dysfunction.    Commonly known as:  VIAGRA                Where to Get Your Medications      These medications were sent to [facility]

## (undated) NOTE — LETTER
[facility]  Administration of Anesthesia  Chivo GONSALES agree to be cared for by a physician anesthesiologist alone and/or with a nurse anesthetist, who is specially trained to monitor me and give me medicine to put me to sleep or keep me comfortable during my procedure    I understand that my anesthesiologist and/or anesthetist is not an employee or agent of [facility] or [employer]. He or she works for [facility]    As the patient asking for anesthesia services, I agree to:  Allow the anesthesiologist (anesthesia doctor) to give me medicine and do additional procedures as necessary. Some examples are: Starting or using an “IV” to give me medicine, fluids or blood during my procedure, and having a breathing tube placed to help me breathe when I’m asleep (intubation). In the event that my heart stops working properly, I understand that my anesthesiologist will make every effort to sustain my life, unless otherwise directed by [facility] Do Not Resuscitate documents.  Tell my anesthesia doctor before my procedure:  If I am pregnant.  The last time that I ate or drank.  iii. All of the medicines I take (including prescriptions, herbal supplements, and pills I can buy without a prescription (including street drugs/illegal medications). Failure to inform my anesthesiologist about these medicines may increase my risk of anesthetic complications.  iv.If I am allergic to anything or have had a reaction to anesthesia before.  I understand how the anesthesia medicine will help me (benefits).  I understand that with any type of anesthesia medicine there are risks:  The most common risks are: nausea, vomiting, sore throat, muscle soreness, damage to my eyes, mouth, or teeth (from breathing tube placement).  Rare risks include: remembering what happened during my procedure, allergic reactions to medications, injury to my airway, heart, lungs, vision, nerves, or  muscles and in extremely rare instances death.  My doctor has explained to me other choices available to me for my care (alternatives).  Pregnant Patients (“epidural”):  I understand that the risks of having an epidural (medicine given into my back to help control pain during labor), include itching, low blood pressure, difficulty urinating, headache or slowing of the baby’s heart. Very rare risks include infection, bleeding, seizure, irregular heart rhythms and nerve injury.  Regional Anesthesia (“spinal”, “epidural”, & “nerve blocks”):  I understand that rare but potential complications include headache, bleeding, infection, seizure, irregular heart rhythms, and nerve injury.    _____________________________________________________________________________  Patient (or Representative) Signature/Relationship to Patient  Date   Time    _____________________________________________________________________________   Name (if used)    Language/Organization   Time    _____________________________________________________________________________  Nurse Anesthetist Signature     Date   Time  _____________________________________________________________________________  Anesthesiologist Signature     Date   Time  I have discussed the procedure and information above with the patient (or patient’s representative) and answered their questions. The patient or their representative has agreed to have anesthesia services.    _____________________________________________________________________________  Witness        Date   Time  I have verified that the signature is that of the patient or patient’s representative, and that it was signed before the procedure  Patient Name: Chivo Miller     : 6/15/1960                 Printed: 2024 at 1:08 PM    Medical Record #: [id number]                                            Page 1 of 1  ----------ANESTHESIA CONSENT----------

## (undated) NOTE — MR AVS SNAPSHOT
[facility],[address], [address] [place] [address] Rd  [phone]               Thank you for choosing us for your health care visit with Tammy Quiroz NP.   We are glad to serve you and happy to provide you with this Lancets Misc   TEST TWICE DAILY           MetFORMIN HCl 500 MG Tabs   Take 1 tablet (500 mg total) by mouth 2 (two) times daily with meals.    Commonly known as:  GLUCOPHAGE           Sildenafil Citrate 50 MG Tabs   Take 1 tablet by mouth daily as neede

## (undated) NOTE — LETTER
Date: 2/27/2024    Patient Name: Chivo Miller          To Whom it may concern:    This letter has been written at the patient's request. The above patient was seen at the [facility] for treatment of a medical condition.    The patient has not been able to work due to injury from 6/6/2023 to present.      Sincerely,          Wendi Arechiga DO

## (undated) NOTE — MR AVS SNAPSHOT
[facility],[address], [address]  [phone]               Thank you for choosing us for your health care visit with Mariola Velasquez NP.   We are glad to serve you and happy to provide you with this glipiZIDE 10 MG Tabs   Take 1 tablet (10 mg total) by mouth 2 (two) times daily before meals.    Commonly known as:  GLUCOTROL           Glucose Blood Strp   TEST TWICE DAILY   Commonly known as:  TRUETRACK TEST           Insulin Degludec 200 UNIT/ML Sopn

## (undated) NOTE — LETTER
Patient Name: Chivo Miller        : 6/15/1960       Medical Record #: [id number]    CONSENT FOR PROCEDURES/SEDATION    Date: 2024       Time: 9:58 AM        1. I authorize the performance upon Chivo Miller the following:    _____BILATERAL CERVICAL, TRAPEZIUS, & RHOMBOID TPI    _____________    2. I authorize Dr. Forman (and whomever is designated as the doctor’s assistant), to perform the above mentioned procedures.    3. If any unforeseen conditions arise during this procedure calling for additional procedures, operations, or medications (including anesthesia and blood transfusion), I  further request and authorize the doctor to do whatever he/she deems advisable in my interest.    4. I consent to the taking and reproduction of any photographs in the course of this procedure for professional purposes.    5. I consent to the administration of such sedation as may be considered necessary or advisable by the physician responsible for this service, with the exception of  _____________________________.    6. I have been informed by my doctor of the nature and purpose of this procedure/sedation, possible alternative methods of treatment, risk involved and possible complications.      Signature of Patient:  ___________________________    Signature of person authorized to consent for patient: Relationship to patient:  ___________________________    ___________________    Witness: ____________________     Date: ______________    Provider: ____________________     Date: ______________

## (undated) NOTE — LETTER
[facility]  [address], [place], [place]  Authorization for Surgical Operation and Procedure                                                                                           I hereby authorize CHELSEA Stockton MD, my physician and his/her assistants (if applicable), which may include medical students, residents, and/or fellows, to perform the following surgical operation/ procedure and administer such anesthesia as may be determined necessary by my physician: Operation/Procedure name (s) COLONOSCOPY on Chivo Miller   2.   I recognize that during the surgical operation/procedure, unforeseen conditions may necessitate additional or different procedures than those listed above.  I, therefore, further authorize and request that the above-named surgeon, assistants, or designees perform such procedures as are, in their judgment, necessary and desirable.    3.   My surgeon/physician has discussed prior to my surgery the potential benefits, risks and side effects of this procedure; the likelihood of achieving goals; and potential problems that might occur during recuperation.  They also discussed reasonable alternatives to the procedure, including risks, benefits, and side effects related to the alternatives and risks related to not receiving this procedure.  I have had all my questions answered and I acknowledge that no guarantee has been made as to the result that may be obtained.    4.   Should the need arise during my operation/procedure, which includes change of level of care prior to discharge, I also consent to the administration of blood and/or blood products.  Further, I understand that despite careful testing and screening of blood or blood products by collecting agencies, I may still be subject to ill effects as a result of receiving a blood transfusion and/or blood products.  The following are some, but not all, of the potential risks that can occur: fever and allergic  reactions, hemolytic reactions, transmission of diseases such as Hepatitis, AIDS and Cytomegalovirus (CMV) and fluid overload.  In the event that I wish to have an autologous transfusion of my own blood, or a directed donor transfusion, I will discuss this with my physician.  Check only if Refusing Blood or Blood Products  I understand refusal of blood or blood products as deemed necessary by my physician may have serious consequences to my condition to include possible death. I hereby assume responsibility for my refusal and release the hospital, its personnel, and my physicians from any responsibility for the consequences of my refusal.    o  Refuse   5.   I authorize the use of any specimen, organs, tissues, body parts or foreign objects that may be removed from my body during the operation/procedure for diagnosis, research or teaching purposes and their subsequent disposal by hospital authorities.  I also authorize the release of specimen test results and/or written reports to my treating physician on the hospital medical staff or other referring or consulting physicians involved in my care, at the discretion of the Pathologist or my treating physician.    6.   I consent to the photographing or videotaping of the operations or procedures to be performed, including appropriate portions of my body for medical, scientific, or educational purposes, provided my identity is not revealed by the pictures or by descriptive texts accompanying them.  If the procedure has been photographed/videotaped, the surgeon will obtain the original picture, image, videotape or CD.  The hospital will not be responsible for storage, release or maintenance of the picture, image, tape or CD.    7.   I consent to the presence of a  or observers in the operating room as deemed necessary by my physician or their designees.    8.   I recognize that in the event my procedure results in extended X-Ray/fluoroscopy time, I may  develop a skin reaction.    9. If I have a Do Not Attempt Resuscitation (DNAR) order in place, that status will be suspended while in the operating room, procedural suite, and during the recovery period unless otherwise explicitly stated by me (or a person authorized to consent on my behalf). The surgeon or my attending physician will determine when the applicable recovery period ends for purposes of reinstating the DNAR order.  10. Patients having a sterilization procedure: I understand that if the procedure is successful the results will be permanent and it will therefore be impossible for me to inseminate, conceive, or bear children.  I also understand that the procedure is intended to result in sterility, although the result has not been guaranteed.   11. I acknowledge that my physician has explained sedation/analgesia administration to me including the risk and benefits I consent to the administration of sedation/analgesia as may be necessary or desirable in the judgment of my physician.    I CERTIFY THAT I HAVE READ AND FULLY UNDERSTAND THE ABOVE CONSENT TO OPERATION and/or OTHER PROCEDURE.     _________________________________________ _________________________________     ___________________________________  Signature of Patient     Signature of Responsible Person                   Printed Name of Responsible Person                              _________________________________________ ______________________________        ___________________________________  Signature of Witness         Date  Time         Relationship to Patient    STATEMENT OF PHYSICIAN My signature below affirms that prior to the time of the procedure; I have explained to the patient and/or his/her legal representative, the risks and benefits involved in the proposed treatment and any reasonable alternative to the proposed treatment. I have also explained the risks and benefits involved in refusal of the proposed treatment and alternatives  to the proposed treatment and have answered the patient's questions. If I have a significant financial interest in a co-management agreement or a significant financial interest in any product or implant, or other significant relationship used in this procedure/surgery, I have disclosed this and had a discussion with my patient.     _______________________________________________________________ _____________________________  (Signature of Physician)                                                                                         (Date)                                   (Time)  Patient Name: Chivo Miller    : 6/15/1960   Printed: 2024      Medical Record #: [id number]                                              Page 1 of 1